# Patient Record
Sex: FEMALE | Race: WHITE | Employment: OTHER | URBAN - NONMETROPOLITAN AREA
[De-identification: names, ages, dates, MRNs, and addresses within clinical notes are randomized per-mention and may not be internally consistent; named-entity substitution may affect disease eponyms.]

---

## 2017-01-03 ENCOUNTER — OFFICE VISIT (OUTPATIENT)
Dept: FAMILY MEDICINE CLINIC | Age: 52
End: 2017-01-03

## 2017-01-03 VITALS
TEMPERATURE: 97.7 F | SYSTOLIC BLOOD PRESSURE: 136 MMHG | WEIGHT: 230.8 LBS | DIASTOLIC BLOOD PRESSURE: 88 MMHG | OXYGEN SATURATION: 98 % | HEIGHT: 65 IN | HEART RATE: 106 BPM | BODY MASS INDEX: 38.45 KG/M2

## 2017-01-03 DIAGNOSIS — R07.81 RIB PAIN ON RIGHT SIDE: Primary | ICD-10-CM

## 2017-01-03 PROCEDURE — 99213 OFFICE O/P EST LOW 20 MIN: CPT | Performed by: FAMILY MEDICINE

## 2017-01-03 ASSESSMENT — ENCOUNTER SYMPTOMS
COUGH: 1
SINUS PRESSURE: 0
WHEEZING: 0

## 2017-01-30 ENCOUNTER — NURSE ONLY (OUTPATIENT)
Dept: SURGERY | Age: 52
End: 2017-01-30

## 2017-01-30 VITALS
SYSTOLIC BLOOD PRESSURE: 110 MMHG | DIASTOLIC BLOOD PRESSURE: 78 MMHG | WEIGHT: 230 LBS | BODY MASS INDEX: 38.32 KG/M2 | HEART RATE: 118 BPM | TEMPERATURE: 98.3 F | HEIGHT: 65 IN

## 2017-01-30 DIAGNOSIS — Z12.11 SCREENING FOR COLON CANCER: ICD-10-CM

## 2017-01-30 DIAGNOSIS — Z86.010 HISTORY OF COLON POLYPS: Primary | ICD-10-CM

## 2017-01-30 RX ORDER — POLYETHYLENE GLYCOL 3350, SODIUM CHLORIDE, SODIUM BICARBONATE, POTASSIUM CHLORIDE 420; 11.2; 5.72; 1.48 G/4L; G/4L; G/4L; G/4L
4000 POWDER, FOR SOLUTION ORAL ONCE
Qty: 4000 ML | Refills: 0 | Status: SHIPPED | OUTPATIENT
Start: 2017-01-30 | End: 2017-01-30

## 2017-01-31 ENCOUNTER — TELEPHONE (OUTPATIENT)
Dept: FAMILY MEDICINE CLINIC | Age: 52
End: 2017-01-31

## 2017-03-08 RX ORDER — SUCRALFATE 1 G/1
TABLET ORAL
Qty: 360 TABLET | Refills: 3 | Status: SHIPPED | OUTPATIENT
Start: 2017-03-08 | End: 2018-02-03 | Stop reason: SDUPTHER

## 2017-04-20 ENCOUNTER — TELEPHONE (OUTPATIENT)
Dept: FAMILY MEDICINE CLINIC | Age: 52
End: 2017-04-20

## 2017-04-20 DIAGNOSIS — G47.00 INSOMNIA, UNSPECIFIED TYPE: ICD-10-CM

## 2017-04-20 RX ORDER — ESZOPICLONE 3 MG/1
TABLET, FILM COATED ORAL
Qty: 90 TABLET | Refills: 1 | Status: SHIPPED | OUTPATIENT
Start: 2017-04-20 | End: 2017-10-24 | Stop reason: SDUPTHER

## 2017-04-27 ENCOUNTER — TELEPHONE (OUTPATIENT)
Dept: SURGERY | Age: 52
End: 2017-04-27

## 2017-06-12 RX ORDER — MONTELUKAST SODIUM 10 MG/1
TABLET ORAL
Qty: 90 TABLET | Refills: 3 | Status: SHIPPED | OUTPATIENT
Start: 2017-06-12 | End: 2018-06-18 | Stop reason: SDUPTHER

## 2017-06-26 ENCOUNTER — TELEPHONE (OUTPATIENT)
Dept: FAMILY MEDICINE CLINIC | Age: 52
End: 2017-06-26

## 2017-06-27 ENCOUNTER — OFFICE VISIT (OUTPATIENT)
Dept: FAMILY MEDICINE CLINIC | Age: 52
End: 2017-06-27
Payer: COMMERCIAL

## 2017-06-27 VITALS
DIASTOLIC BLOOD PRESSURE: 80 MMHG | HEIGHT: 64 IN | BODY MASS INDEX: 36.54 KG/M2 | OXYGEN SATURATION: 98 % | HEART RATE: 68 BPM | SYSTOLIC BLOOD PRESSURE: 132 MMHG | WEIGHT: 214 LBS

## 2017-06-27 DIAGNOSIS — M15.2 BOUCHARD'S NODES (WITH ARTHROPATHY): Primary | ICD-10-CM

## 2017-06-27 DIAGNOSIS — L70.8 OTHER ACNE: ICD-10-CM

## 2017-06-27 DIAGNOSIS — S39.011A STRAIN OF ABDOMINAL MUSCLE, INITIAL ENCOUNTER: ICD-10-CM

## 2017-06-27 PROCEDURE — G8419 CALC BMI OUT NRM PARAM NOF/U: HCPCS | Performed by: NURSE PRACTITIONER

## 2017-06-27 PROCEDURE — 99214 OFFICE O/P EST MOD 30 MIN: CPT | Performed by: NURSE PRACTITIONER

## 2017-06-27 PROCEDURE — 1036F TOBACCO NON-USER: CPT | Performed by: NURSE PRACTITIONER

## 2017-06-27 PROCEDURE — 3014F SCREEN MAMMO DOC REV: CPT | Performed by: NURSE PRACTITIONER

## 2017-06-27 PROCEDURE — 3017F COLORECTAL CA SCREEN DOC REV: CPT | Performed by: NURSE PRACTITIONER

## 2017-06-27 PROCEDURE — G8427 DOCREV CUR MEDS BY ELIG CLIN: HCPCS | Performed by: NURSE PRACTITIONER

## 2017-06-27 RX ORDER — CLINDAMYCIN PHOSPHATE 10 MG/G
GEL TOPICAL
Qty: 1 TUBE | Refills: 0 | Status: SHIPPED | OUTPATIENT
Start: 2017-06-27 | End: 2018-02-07 | Stop reason: SDUPTHER

## 2017-06-27 ASSESSMENT — ENCOUNTER SYMPTOMS
DIARRHEA: 0
VOMITING: 0
CHEST TIGHTNESS: 0
SHORTNESS OF BREATH: 0
EYES NEGATIVE: 1
SINUS PRESSURE: 0
ABDOMINAL PAIN: 1
COLOR CHANGE: 1
CONSTIPATION: 0
TROUBLE SWALLOWING: 0
ALLERGIC/IMMUNOLOGIC NEGATIVE: 1
RESPIRATORY NEGATIVE: 1
COUGH: 0
NAUSEA: 0

## 2017-07-10 RX ORDER — BUPROPION HYDROCHLORIDE 150 MG/1
TABLET, EXTENDED RELEASE ORAL
Qty: 60 TABLET | Refills: 0 | Status: ON HOLD | OUTPATIENT
Start: 2017-07-10 | End: 2018-11-27 | Stop reason: HOSPADM

## 2017-09-09 DIAGNOSIS — K21.9 GASTROESOPHAGEAL REFLUX DISEASE, ESOPHAGITIS PRESENCE NOT SPECIFIED: ICD-10-CM

## 2017-09-11 ENCOUNTER — OFFICE VISIT (OUTPATIENT)
Dept: FAMILY MEDICINE CLINIC | Age: 52
End: 2017-09-11
Payer: COMMERCIAL

## 2017-09-11 ENCOUNTER — HOSPITAL ENCOUNTER (OUTPATIENT)
Dept: LAB | Age: 52
Discharge: HOME | End: 2017-09-11
Payer: COMMERCIAL

## 2017-09-11 VITALS
WEIGHT: 226 LBS | BODY MASS INDEX: 37.65 KG/M2 | HEART RATE: 121 BPM | DIASTOLIC BLOOD PRESSURE: 74 MMHG | TEMPERATURE: 97.7 F | OXYGEN SATURATION: 98 % | HEIGHT: 65 IN | SYSTOLIC BLOOD PRESSURE: 110 MMHG

## 2017-09-11 DIAGNOSIS — G56.02 CARPAL TUNNEL SYNDROME OF LEFT WRIST: ICD-10-CM

## 2017-09-11 DIAGNOSIS — Z11.4 SCREENING FOR HIV (HUMAN IMMUNODEFICIENCY VIRUS): ICD-10-CM

## 2017-09-11 DIAGNOSIS — B96.89 ACUTE BACTERIAL SINUSITIS: Primary | ICD-10-CM

## 2017-09-11 DIAGNOSIS — Z11.59 ENCOUNTER FOR HEPATITIS C SCREENING TEST FOR LOW RISK PATIENT: ICD-10-CM

## 2017-09-11 DIAGNOSIS — J01.90 ACUTE BACTERIAL SINUSITIS: Primary | ICD-10-CM

## 2017-09-11 DIAGNOSIS — Z13.220 SCREENING CHOLESTEROL LEVEL: ICD-10-CM

## 2017-09-11 DIAGNOSIS — Z13.1 SCREENING FOR DIABETES MELLITUS: ICD-10-CM

## 2017-09-11 LAB
CHOLESTEROL/HDL RATIO: 2.3
CHOLESTEROL: 174 MG/DL
ESTIMATED AVERAGE GLUCOSE: 189 MG/DL
HBA1C MFR BLD: 8.2 % (ref 4.8–5.9)
HDLC SERPL-MCNC: 76 MG/DL
HEPATITIS C ANTIBODY: NONREACTIVE
HIV AG/AB: NONREACTIVE
LDL CHOLESTEROL: 78 MG/DL (ref 0–130)
TRIGL SERPL-MCNC: 102 MG/DL
VLDLC SERPL CALC-MCNC: NORMAL MG/DL (ref 1–30)

## 2017-09-11 PROCEDURE — 36415 COLL VENOUS BLD VENIPUNCTURE: CPT

## 2017-09-11 PROCEDURE — 1036F TOBACCO NON-USER: CPT | Performed by: FAMILY MEDICINE

## 2017-09-11 PROCEDURE — 3017F COLORECTAL CA SCREEN DOC REV: CPT | Performed by: FAMILY MEDICINE

## 2017-09-11 PROCEDURE — 86803 HEPATITIS C AB TEST: CPT

## 2017-09-11 PROCEDURE — G8427 DOCREV CUR MEDS BY ELIG CLIN: HCPCS | Performed by: FAMILY MEDICINE

## 2017-09-11 PROCEDURE — 99214 OFFICE O/P EST MOD 30 MIN: CPT | Performed by: FAMILY MEDICINE

## 2017-09-11 PROCEDURE — 83036 HEMOGLOBIN GLYCOSYLATED A1C: CPT

## 2017-09-11 PROCEDURE — G8417 CALC BMI ABV UP PARAM F/U: HCPCS | Performed by: FAMILY MEDICINE

## 2017-09-11 PROCEDURE — 3014F SCREEN MAMMO DOC REV: CPT | Performed by: FAMILY MEDICINE

## 2017-09-11 PROCEDURE — 87389 HIV-1 AG W/HIV-1&-2 AB AG IA: CPT

## 2017-09-11 PROCEDURE — 80061 LIPID PANEL: CPT

## 2017-09-11 RX ORDER — AMOXICILLIN 500 MG/1
500 CAPSULE ORAL 2 TIMES DAILY
Qty: 20 CAPSULE | Refills: 0 | Status: SHIPPED | OUTPATIENT
Start: 2017-09-11 | End: 2017-11-15

## 2017-09-11 RX ORDER — MORPHINE SULFATE 15 MG/1
15 TABLET, FILM COATED, EXTENDED RELEASE ORAL 2 TIMES DAILY
COMMUNITY
Start: 2017-08-22 | End: 2019-04-18 | Stop reason: SDUPTHER

## 2017-09-11 ASSESSMENT — ENCOUNTER SYMPTOMS
SINUS PRESSURE: 1
SINUS COMPLAINT: 1
TROUBLE SWALLOWING: 0
COUGH: 1
CHEST TIGHTNESS: 0
DIARRHEA: 0
NAUSEA: 0
SHORTNESS OF BREATH: 0
WHEEZING: 0
CONSTIPATION: 0
CHOKING: 0
SORE THROAT: 0

## 2017-09-12 DIAGNOSIS — E11.9 TYPE 2 DIABETES MELLITUS WITHOUT COMPLICATION, WITHOUT LONG-TERM CURRENT USE OF INSULIN (HCC): Primary | ICD-10-CM

## 2017-09-14 RX ORDER — ESOMEPRAZOLE MAGNESIUM 40 MG/1
CAPSULE, DELAYED RELEASE ORAL
Qty: 60 CAPSULE | Refills: 5 | Status: SHIPPED | OUTPATIENT
Start: 2017-09-14 | End: 2018-09-27 | Stop reason: SDUPTHER

## 2017-10-24 DIAGNOSIS — G47.00 INSOMNIA, UNSPECIFIED TYPE: ICD-10-CM

## 2017-10-24 RX ORDER — ESZOPICLONE 3 MG/1
TABLET, FILM COATED ORAL
Qty: 90 TABLET | Refills: 3 | Status: SHIPPED | OUTPATIENT
Start: 2017-10-24 | End: 2018-04-13 | Stop reason: SDUPTHER

## 2017-10-31 ENCOUNTER — HOSPITAL ENCOUNTER (OUTPATIENT)
Dept: GENERAL RADIOLOGY | Age: 52
Discharge: HOME | End: 2017-10-31
Payer: COMMERCIAL

## 2017-10-31 ENCOUNTER — OFFICE VISIT (OUTPATIENT)
Dept: FAMILY MEDICINE CLINIC | Age: 52
End: 2017-10-31
Payer: COMMERCIAL

## 2017-10-31 VITALS
OXYGEN SATURATION: 98 % | DIASTOLIC BLOOD PRESSURE: 88 MMHG | BODY MASS INDEX: 36.15 KG/M2 | WEIGHT: 217 LBS | SYSTOLIC BLOOD PRESSURE: 138 MMHG | TEMPERATURE: 97.9 F | HEIGHT: 65 IN | HEART RATE: 113 BPM

## 2017-10-31 DIAGNOSIS — M25.551 HIP PAIN, ACUTE, RIGHT: ICD-10-CM

## 2017-10-31 DIAGNOSIS — M25.551 HIP PAIN, ACUTE, RIGHT: Primary | ICD-10-CM

## 2017-10-31 DIAGNOSIS — J02.9 ACUTE VIRAL PHARYNGITIS: ICD-10-CM

## 2017-10-31 PROCEDURE — 99214 OFFICE O/P EST MOD 30 MIN: CPT | Performed by: FAMILY MEDICINE

## 2017-10-31 PROCEDURE — 73502 X-RAY EXAM HIP UNI 2-3 VIEWS: CPT

## 2017-10-31 PROCEDURE — 3014F SCREEN MAMMO DOC REV: CPT | Performed by: FAMILY MEDICINE

## 2017-10-31 PROCEDURE — G8417 CALC BMI ABV UP PARAM F/U: HCPCS | Performed by: FAMILY MEDICINE

## 2017-10-31 PROCEDURE — 1036F TOBACCO NON-USER: CPT | Performed by: FAMILY MEDICINE

## 2017-10-31 PROCEDURE — G8427 DOCREV CUR MEDS BY ELIG CLIN: HCPCS | Performed by: FAMILY MEDICINE

## 2017-10-31 PROCEDURE — 3017F COLORECTAL CA SCREEN DOC REV: CPT | Performed by: FAMILY MEDICINE

## 2017-10-31 PROCEDURE — G8484 FLU IMMUNIZE NO ADMIN: HCPCS | Performed by: FAMILY MEDICINE

## 2017-10-31 RX ORDER — BENZONATATE 100 MG/1
100 CAPSULE ORAL 3 TIMES DAILY
Qty: 90 CAPSULE | Refills: 5 | Status: SHIPPED | OUTPATIENT
Start: 2017-10-31 | End: 2018-12-28 | Stop reason: SDUPTHER

## 2017-10-31 ASSESSMENT — ENCOUNTER SYMPTOMS
CHOKING: 0
WHEEZING: 0
CHEST TIGHTNESS: 0
DIARRHEA: 0
CONSTIPATION: 0
NAUSEA: 0
SORE THROAT: 1
TROUBLE SWALLOWING: 0
COUGH: 1
SINUS PRESSURE: 1
ABDOMINAL PAIN: 0
SHORTNESS OF BREATH: 0

## 2017-10-31 NOTE — PATIENT INSTRUCTIONS
Patient Education        Hip Arthritis: Exercises  Your Care Instructions  Here are some examples of exercises for hip arthritis. Start each exercise slowly. Ease off the exercise if you start to have pain. Your doctor or physical therapist will tell you when you can start these exercises and which ones will work best for you. How to do the exercises  Straight-leg raises to the outside    1. Lie on your side, with your affected hip on top. 2. Tighten the front thigh muscles of your top leg to keep your knee straight. 3. Keep your hip and your leg straight in line with the rest of your body, and keep your knee pointing forward. Do not drop your hip back. 4. Lift your top leg straight up toward the ceiling, about 12 inches off the floor. Hold for about 6 seconds, then slowly lower your leg. 5. Repeat 8 to 12 times. 6. Switch legs and repeat steps 1 through 5, even if only one hip is sore. Straight-leg raises to the inside    1. Lie on your side with your affected hip on the floor. 2. You can either prop up your other leg on a chair, or you can bend that knee and put that foot in front of your other knee. Do not drop your hip back. 3. Tighten the muscles on the front thigh of your bottom leg to straighten that knee. 4. Keep your kneecap pointing forward and your leg straight, and lift your bottom leg up toward the ceiling about 6 inches. Hold for about 6 seconds, then lower slowly. 5. Repeat 8 to 12 times. 6. Switch legs and repeat steps 1 through 5, even if only one hip is sore. Hip hike    1. Stand sideways on the bottom step of a staircase, and hold on to the banister or wall. 2. Keeping both knees straight, lift your good leg off the step and let it hang down. Then hike your good hip up to the same level as your affected hip or a little higher. 3. Repeat 8 to 12 times. 4. Switch legs and repeat steps 1 through 3, even if only one hip is sore. Bridging    1. Lie on your back with both knees bent. 15 to 30 seconds. 5. Repeat 2 to 4 times. 6. Repeat steps 1 through 5, but this time use your hand to gently pull your knee toward your opposite shoulder. 7. Switch legs and repeat steps 1 through 6, even if only one hip is sore. Knee-to-chest    1. Lie on your back with your knees bent and your feet flat on the floor. 2. Bring your affected leg to your chest, keeping the other foot flat on the floor (or keeping the other leg straight, whichever feels better on your lower back). 3. Keep your lower back pressed to the floor. Hold for at least 15 to 30 seconds. 4. Relax, and lower the knee to the starting position. 5. Repeat 2 to 4 times. 6. Switch legs and repeat steps 1 through 5, even if only one hip is sore. 7. To get more stretch, put your other leg flat on the floor while pulling your knee to your chest.  Clamshell    1. Lie on your side, with your affected hip on top. Keep your feet and knees together and your knees bent. 2. Raise your top knee, but keep your feet together. Do not let your hips roll back. Your legs should open up like a clamshell. 3. Hold for 6 seconds. 4. Slowly lower your knee back down. Rest for 10 seconds. 5. Repeat 8 to 12 times. 6. Switch legs and repeat steps 1 through 5, even if only one hip is sore. Follow-up care is a key part of your treatment and safety. Be sure to make and go to all appointments, and call your doctor if you are having problems. It's also a good idea to know your test results and keep a list of the medicines you take. Where can you learn more? Go to [email or web address] and sign in to your [employer] account. Enter V143 in the [facility] box to learn more about \"Hip Arthritis: Exercises. \"     If you do not have an account, please click on the \"Sign Up Now\" link. Current as of: March 21, 2017  Content Version: 11.3  © 6776-9044 [employer]. Care instructions adapted under license by [facility] [place] ([facility]).  If you have questions about a medical condition or this instruction, always ask your healthcare professional. [place] [address] any warranty or liability for your use of this information.

## 2017-10-31 NOTE — PROGRESS NOTES
[address] [facility]  Dept: [phone]  Dept Fax: [phone]  Loc: [phone]    Patt De La Cruz is a 46 y.o. female who presents today for her medical conditions/complaints as noted below. Patt De La Cruz is c/o of   Chief Complaint   Patient presents with    Hip Pain     x 2 weeks- fell slipped in the shower, states it don't feel right. pain is about 8-9  feels like a stabbing pain- right hip    Pharyngitis     x 2 days, chills. denies fever, cough, stuffy nose       HPI:     Here today for hip pain and a sore throat. Hip Pain    The incident occurred more than 1 week ago (2 weeks ago). The incident occurred at home. The injury mechanism was a fall. The pain is present in the right hip. The quality of the pain is described as stabbing and shooting. The pain is at a severity of 8/10. The pain is severe. The pain has been fluctuating since onset. Associated symptoms include numbness (in right thigh). Pertinent negatives include no inability to bear weight or muscle weakness. She reports no foreign bodies present. The symptoms are aggravated by movement and weight bearing. She has tried heat (she is chronically on morphine and norco) for the symptoms. The treatment provided mild relief. Pharyngitis   This is a new problem. The current episode started in the past 7 days (3 days). The problem occurs constantly. The problem has been unchanged. Associated symptoms include arthralgias (right hip), chills, congestion, coughing (productive), fatigue, headaches, numbness (in right thigh) and a sore throat. Pertinent negatives include no abdominal pain, chest pain, fever, nausea or rash. The symptoms are aggravated by drinking and eating. She has tried nothing for the symptoms. The treatment provided no relief.          Past Medical History:   Diagnosis Date    Anemia     Arthritis     rheumatoid    Asthma     DJD (degenerative joint disease)

## 2017-11-15 ENCOUNTER — OFFICE VISIT (OUTPATIENT)
Dept: FAMILY MEDICINE CLINIC | Age: 52
End: 2017-11-15
Payer: COMMERCIAL

## 2017-11-15 VITALS
DIASTOLIC BLOOD PRESSURE: 86 MMHG | SYSTOLIC BLOOD PRESSURE: 122 MMHG | BODY MASS INDEX: 34.32 KG/M2 | HEIGHT: 65 IN | WEIGHT: 206 LBS | TEMPERATURE: 97.6 F | HEART RATE: 110 BPM | OXYGEN SATURATION: 98 %

## 2017-11-15 DIAGNOSIS — B96.89 ACUTE BACTERIAL SINUSITIS: Primary | ICD-10-CM

## 2017-11-15 DIAGNOSIS — J01.90 ACUTE BACTERIAL SINUSITIS: Primary | ICD-10-CM

## 2017-11-15 PROCEDURE — 3014F SCREEN MAMMO DOC REV: CPT | Performed by: FAMILY MEDICINE

## 2017-11-15 PROCEDURE — G8417 CALC BMI ABV UP PARAM F/U: HCPCS | Performed by: FAMILY MEDICINE

## 2017-11-15 PROCEDURE — 99214 OFFICE O/P EST MOD 30 MIN: CPT | Performed by: FAMILY MEDICINE

## 2017-11-15 PROCEDURE — G8484 FLU IMMUNIZE NO ADMIN: HCPCS | Performed by: FAMILY MEDICINE

## 2017-11-15 PROCEDURE — 3017F COLORECTAL CA SCREEN DOC REV: CPT | Performed by: FAMILY MEDICINE

## 2017-11-15 PROCEDURE — 1036F TOBACCO NON-USER: CPT | Performed by: FAMILY MEDICINE

## 2017-11-15 PROCEDURE — G8427 DOCREV CUR MEDS BY ELIG CLIN: HCPCS | Performed by: FAMILY MEDICINE

## 2017-11-15 RX ORDER — AMOXICILLIN 500 MG/1
500 CAPSULE ORAL 3 TIMES DAILY
Qty: 30 CAPSULE | Refills: 0 | Status: SHIPPED | OUTPATIENT
Start: 2017-11-15 | End: 2018-05-22 | Stop reason: ALTCHOICE

## 2017-11-15 RX ORDER — PREDNISONE 20 MG/1
20 TABLET ORAL DAILY
Qty: 5 TABLET | Refills: 0 | Status: SHIPPED | OUTPATIENT
Start: 2017-11-15 | End: 2017-11-20

## 2017-11-15 ASSESSMENT — ENCOUNTER SYMPTOMS
SORE THROAT: 1
SINUS PRESSURE: 1
WHEEZING: 0
TROUBLE SWALLOWING: 0
COUGH: 1
DIARRHEA: 0
NAUSEA: 0
CHEST TIGHTNESS: 0
SHORTNESS OF BREATH: 1
CONSTIPATION: 0
CHOKING: 0

## 2017-11-15 NOTE — PROGRESS NOTES
capsule Take 1 capsule by mouth 3 times daily 90 capsule 5    eszopiclone (LUNESTA) 3 MG TABS TAKE 1 TABLET BY MOUTH NIGHTLY AT BEDTIME 90 tablet 3    esomeprazole (NEXIUM) 40 MG delayed release capsule TAKE 1 CAPSULE TWICE DAILY 60 capsule 5    metFORMIN (GLUCOPHAGE) 1000 MG tablet Take 1 tablet by mouth 2 times daily (with meals) Take 1/2 a tablet twice a day for the first 2 weeks. 180 tablet 3    morphine (MS CONTIN) 15 MG extended release tablet Take 15 mg by mouth 2 times daily .  buPROPion (WELLBUTRIN SR) 150 MG extended release tablet TAKE 1 TABLET BY MOUTH 2 TIMES DAILY 60 tablet 0    montelukast (SINGULAIR) 10 MG tablet TAKE 1 TABLET BY MOUTH DAILY 90 tablet 3    sucralfate (CARAFATE) 1 GM tablet TAKE 1 TABLET 4 TIMES DAILY 360 tablet 3    ondansetron (ZOFRAN ODT) 4 MG disintegrating tablet Take 1 tablet by mouth every 8 hours as needed for Nausea or Vomiting 40 tablet 0    Loratadine 10 MG CAPS Take by mouth      gabapentin (NEURONTIN) 400 MG capsule Take 400 mg by mouth 1 cap q AM, 2 caps q HS      HYDROcodone-acetaminophen (NORCO)  MG per tablet Take 1 tablet by mouth every 6 hours as needed       furosemide (LASIX) 20 MG tablet Take 20 mg by mouth daily as needed      meloxicam (MOBIC) 15 MG tablet Take 15 mg by mouth daily      lidocaine (LIDODERM) 5 % Place 1 patch onto the skin daily 12 hours on, 12 hours off. 30 patch 0    Clobetasol Propionate 0.05 % SHAM Apply to scalp daily as needed for itching 118 mL 3    diclofenac sodium (VOLTAREN) 1 % GEL Apply 4 g topically 4 times daily as needed 5 Tube 11    Cholecalciferol (VITAMIN D) 2000 UNITS CAPS capsule Take 2,000 Units by mouth 2 times daily      albuterol (PROAIR HFA) 108 (90 BASE) MCG/ACT inhaler Inhale 2 puffs into the lungs every 6 hours as needed for Wheezing 1 Inhaler 5    LORazepam (ATIVAN) 0.5 MG tablet Take 1 tablet by mouth daily as needed.  30 tablet 0    predniSONE (DELTASONE) 5 MG tablet Take 5 mg by mouth supple. Cardiovascular: Normal rate, regular rhythm, normal heart sounds and intact distal pulses. No murmur heard. Pulmonary/Chest: Effort normal and breath sounds normal. No respiratory distress. She has no wheezes. She has no rales. Lymphadenopathy:     She has cervical adenopathy. Neurological: She is alert and oriented to person, place, and time. Skin: Skin is warm and dry. No rash noted. Psychiatric: She has a normal mood and affect. Her behavior is normal. Judgment normal.   Nursing note and vitals reviewed. /86   Pulse 110   Temp 97.6 °F (36.4 °C) (Tympanic)   Ht 5' 5\" (1.651 m)   Wt 206 lb (93.4 kg)   LMP 04/12/2012   SpO2 98%   BMI 34.28 kg/m²     Assessment:      1. Acute bacterial sinusitis               Plan:       Sinusitis: New; I will treat with amoxicillin. she was also advised to use flonase, nasal saline and mucinex for her congestion. Return if symptoms worsen or fail to improve. Orders Placed This Encounter   Medications    amoxicillin (AMOXIL) 500 MG capsule     Sig: Take 1 capsule by mouth 3 times daily     Dispense:  30 capsule     Refill:  0    predniSONE (DELTASONE) 20 MG tablet     Sig: Take 1 tablet by mouth daily for 5 days     Dispense:  5 tablet     Refill:  0       Patient given educational materials - see patient instructions. Discussed use, benefit, and side effects of prescribed medications. All patient questions answered. Pt voiced understanding. Reviewed health maintenance. Instructed to continue current medications, diet and exercise. Patient agreed with treatment plan. Follow up as directed.      Electronically signed by Maverick Goddard MD on 11/15/2017 at 11:59 AM

## 2018-01-04 ENCOUNTER — TELEPHONE (OUTPATIENT)
Dept: FAMILY MEDICINE CLINIC | Age: 53
End: 2018-01-04

## 2018-01-04 RX ORDER — SENNA PLUS 8.6 MG/1
1 TABLET ORAL 2 TIMES DAILY
Qty: 60 TABLET | Refills: 0 | Status: SHIPPED | OUTPATIENT
Start: 2018-01-04 | End: 2019-01-04

## 2018-01-04 NOTE — TELEPHONE ENCOUNTER
Pt calling stating that she is having constipation. Pt states she had a small BM on 1/2/18. Pt states she tried colace. Pt is requesting a medication be sent in. Pt uses [employer] .

## 2018-02-05 RX ORDER — SUCRALFATE 1 G/1
TABLET ORAL
Qty: 360 TABLET | Refills: 3 | Status: SHIPPED | OUTPATIENT
Start: 2018-02-05 | End: 2018-03-06 | Stop reason: SDUPTHER

## 2018-02-07 DIAGNOSIS — L70.8 OTHER ACNE: ICD-10-CM

## 2018-02-07 RX ORDER — CLINDAMYCIN PHOSPHATE 10 MG/G
GEL TOPICAL
Qty: 60 G | Refills: 3 | Status: SHIPPED | OUTPATIENT
Start: 2018-02-07 | End: 2018-02-14

## 2018-03-06 RX ORDER — SUCRALFATE 1 G/1
TABLET ORAL
Qty: 360 TABLET | Refills: 3 | Status: SHIPPED | OUTPATIENT
Start: 2018-03-06 | End: 2019-01-25 | Stop reason: SDUPTHER

## 2018-04-13 DIAGNOSIS — G47.00 INSOMNIA, UNSPECIFIED TYPE: ICD-10-CM

## 2018-04-13 RX ORDER — ESZOPICLONE 3 MG/1
TABLET, FILM COATED ORAL
Qty: 90 TABLET | Refills: 3 | Status: SHIPPED | OUTPATIENT
Start: 2018-04-13 | End: 2018-10-16 | Stop reason: SDUPTHER

## 2018-05-08 ENCOUNTER — TELEPHONE (OUTPATIENT)
Dept: FAMILY MEDICINE CLINIC | Age: 53
End: 2018-05-08

## 2018-05-15 DIAGNOSIS — R60.0 BILATERAL LEG EDEMA: Primary | ICD-10-CM

## 2018-05-15 RX ORDER — FUROSEMIDE 20 MG/1
20 TABLET ORAL DAILY
Qty: 90 TABLET | Refills: 3 | Status: CANCELLED | OUTPATIENT
Start: 2018-05-15

## 2018-05-16 RX ORDER — FUROSEMIDE 20 MG/1
20 TABLET ORAL DAILY
Qty: 90 TABLET | Refills: 3 | Status: SHIPPED | OUTPATIENT
Start: 2018-05-16 | End: 2019-06-03 | Stop reason: SDUPTHER

## 2018-05-22 ENCOUNTER — OFFICE VISIT (OUTPATIENT)
Dept: FAMILY MEDICINE CLINIC | Age: 53
End: 2018-05-22
Payer: COMMERCIAL

## 2018-05-22 VITALS
WEIGHT: 214 LBS | DIASTOLIC BLOOD PRESSURE: 80 MMHG | OXYGEN SATURATION: 98 % | HEIGHT: 65 IN | SYSTOLIC BLOOD PRESSURE: 118 MMHG | TEMPERATURE: 98.7 F | HEART RATE: 101 BPM | BODY MASS INDEX: 35.65 KG/M2

## 2018-05-22 DIAGNOSIS — J01.90 ACUTE BACTERIAL SINUSITIS: Primary | ICD-10-CM

## 2018-05-22 DIAGNOSIS — R22.43 LOCALIZED SWELLING OF BOTH LOWER LEGS: ICD-10-CM

## 2018-05-22 DIAGNOSIS — E11.9 TYPE 2 DIABETES MELLITUS WITHOUT COMPLICATION, WITHOUT LONG-TERM CURRENT USE OF INSULIN (HCC): ICD-10-CM

## 2018-05-22 DIAGNOSIS — B96.89 ACUTE BACTERIAL SINUSITIS: Primary | ICD-10-CM

## 2018-05-22 DIAGNOSIS — Z12.39 SCREENING FOR BREAST CANCER: ICD-10-CM

## 2018-05-22 PROCEDURE — 3017F COLORECTAL CA SCREEN DOC REV: CPT | Performed by: FAMILY MEDICINE

## 2018-05-22 PROCEDURE — 99214 OFFICE O/P EST MOD 30 MIN: CPT | Performed by: FAMILY MEDICINE

## 2018-05-22 PROCEDURE — 3046F HEMOGLOBIN A1C LEVEL >9.0%: CPT | Performed by: FAMILY MEDICINE

## 2018-05-22 PROCEDURE — G8417 CALC BMI ABV UP PARAM F/U: HCPCS | Performed by: FAMILY MEDICINE

## 2018-05-22 PROCEDURE — 2022F DILAT RTA XM EVC RTNOPTHY: CPT | Performed by: FAMILY MEDICINE

## 2018-05-22 PROCEDURE — 1036F TOBACCO NON-USER: CPT | Performed by: FAMILY MEDICINE

## 2018-05-22 PROCEDURE — G8427 DOCREV CUR MEDS BY ELIG CLIN: HCPCS | Performed by: FAMILY MEDICINE

## 2018-05-22 RX ORDER — ESZOPICLONE 3 MG/1
TABLET, FILM COATED ORAL
COMMUNITY
Start: 2018-05-16 | End: 2018-10-16

## 2018-05-22 RX ORDER — CEPHALEXIN 500 MG/1
CAPSULE ORAL
COMMUNITY
End: 2018-08-07 | Stop reason: ALTCHOICE

## 2018-05-22 RX ORDER — OXYCODONE AND ACETAMINOPHEN 10; 325 MG/1; MG/1
TABLET ORAL
COMMUNITY
End: 2018-08-07 | Stop reason: ALTCHOICE

## 2018-05-22 RX ORDER — CLINDAMYCIN PHOSPHATE 10 MG/G
GEL TOPICAL
COMMUNITY
End: 2019-06-17 | Stop reason: SDUPTHER

## 2018-05-22 RX ORDER — AMOXICILLIN 500 MG/1
500 CAPSULE ORAL 2 TIMES DAILY
Qty: 20 CAPSULE | Refills: 0 | Status: SHIPPED | OUTPATIENT
Start: 2018-05-22 | End: 2018-08-07 | Stop reason: ALTCHOICE

## 2018-05-22 ASSESSMENT — ENCOUNTER SYMPTOMS
CHOKING: 0
SHORTNESS OF BREATH: 0
TROUBLE SWALLOWING: 0
SORE THROAT: 0
WHEEZING: 0
DIARRHEA: 0
CHEST TIGHTNESS: 0
SINUS COMPLAINT: 1
SINUS PRESSURE: 1
COUGH: 1
NAUSEA: 0
CONSTIPATION: 0

## 2018-06-07 ENCOUNTER — TELEPHONE (OUTPATIENT)
Dept: FAMILY MEDICINE CLINIC | Age: 53
End: 2018-06-07

## 2018-06-18 RX ORDER — MONTELUKAST SODIUM 10 MG/1
TABLET ORAL
Qty: 90 TABLET | Refills: 3 | Status: SHIPPED | OUTPATIENT
Start: 2018-06-18 | End: 2019-06-13 | Stop reason: SDUPTHER

## 2018-07-16 ENCOUNTER — TELEPHONE (OUTPATIENT)
Dept: PRIMARY CARE CLINIC | Age: 53
End: 2018-07-16

## 2018-07-16 NOTE — LETTER
[facility]  [address] [address] [address] [address]  Phone: [phone]  Fax: [phone]    Justin James MD        July 16, 2018     [address] [facility]      Dear Jerry Looney: This letter is a reminder that your Basic Metabolic Profile, Hgb Y1W and Urinalysis tests are due. Please call our office to schedule an appointment. If you've already underwent or scheduled these procedures, please disregard this notice.     Sincerely,        Justin James MD

## 2018-08-07 ENCOUNTER — OFFICE VISIT (OUTPATIENT)
Dept: FAMILY MEDICINE CLINIC | Age: 53
End: 2018-08-07
Payer: COMMERCIAL

## 2018-08-07 VITALS
DIASTOLIC BLOOD PRESSURE: 84 MMHG | OXYGEN SATURATION: 96 % | TEMPERATURE: 98.2 F | HEIGHT: 65 IN | WEIGHT: 210.8 LBS | SYSTOLIC BLOOD PRESSURE: 138 MMHG | HEART RATE: 117 BPM | BODY MASS INDEX: 35.12 KG/M2

## 2018-08-07 DIAGNOSIS — R22.43 LOCALIZED SWELLING OF BOTH LOWER LEGS: Primary | ICD-10-CM

## 2018-08-07 PROCEDURE — G8427 DOCREV CUR MEDS BY ELIG CLIN: HCPCS | Performed by: FAMILY MEDICINE

## 2018-08-07 PROCEDURE — 99214 OFFICE O/P EST MOD 30 MIN: CPT | Performed by: FAMILY MEDICINE

## 2018-08-07 PROCEDURE — G8417 CALC BMI ABV UP PARAM F/U: HCPCS | Performed by: FAMILY MEDICINE

## 2018-08-07 PROCEDURE — 1036F TOBACCO NON-USER: CPT | Performed by: FAMILY MEDICINE

## 2018-08-07 PROCEDURE — 3017F COLORECTAL CA SCREEN DOC REV: CPT | Performed by: FAMILY MEDICINE

## 2018-08-07 ASSESSMENT — PATIENT HEALTH QUESTIONNAIRE - PHQ9
SUM OF ALL RESPONSES TO PHQ9 QUESTIONS 1 & 2: 2
1. LITTLE INTEREST OR PLEASURE IN DOING THINGS: 1
SUM OF ALL RESPONSES TO PHQ QUESTIONS 1-9: 2
2. FEELING DOWN, DEPRESSED OR HOPELESS: 1

## 2018-08-07 ASSESSMENT — ENCOUNTER SYMPTOMS
COLOR CHANGE: 1
CHEST TIGHTNESS: 0
COUGH: 0
WHEEZING: 0
SHORTNESS OF BREATH: 0

## 2018-08-07 NOTE — PROGRESS NOTES
MG extended release tablet Take 15 mg by mouth 2 times daily .  buPROPion (WELLBUTRIN SR) 150 MG extended release tablet TAKE 1 TABLET BY MOUTH 2 TIMES DAILY 60 tablet 0    Loratadine 10 MG CAPS Take by mouth      gabapentin (NEURONTIN) 400 MG capsule Take 400 mg by mouth 1 cap q AM, 2 caps q HS      HYDROcodone-acetaminophen (NORCO)  MG per tablet Take 1 tablet by mouth every 6 hours as needed       meloxicam (MOBIC) 15 MG tablet Take 15 mg by mouth daily      lidocaine (LIDODERM) 5 % Place 1 patch onto the skin daily 12 hours on, 12 hours off. 30 patch 0    diclofenac sodium (VOLTAREN) 1 % GEL Apply 4 g topically 4 times daily as needed 5 Tube 11    Cholecalciferol (VITAMIN D) 2000 UNITS CAPS capsule Take 2,000 Units by mouth 2 times daily      albuterol (PROAIR HFA) 108 (90 BASE) MCG/ACT inhaler Inhale 2 puffs into the lungs every 6 hours as needed for Wheezing 1 Inhaler 5    LORazepam (ATIVAN) 0.5 MG tablet Take 1 tablet by mouth daily as needed. 30 tablet 0    predniSONE (DELTASONE) 5 MG tablet Take 5 mg by mouth 2 times daily.  hydroxychloroquine (PLAQUENIL) 200 MG tablet   Take by mouth One every morning and 2 every night      clindamycin (CLINDAGEL) 1 % gel clindamycin 1 % topical gel      furosemide (LASIX) 20 MG tablet Take 1 tablet by mouth daily 90 tablet 3    benzonatate (TESSALON PERLES) 100 MG capsule Take 1 capsule by mouth 3 times daily 90 capsule 5    metFORMIN (GLUCOPHAGE) 1000 MG tablet Take 1 tablet by mouth 2 times daily (with meals) Take 1/2 a tablet twice a day for the first 2 weeks. 180 tablet 3    ondansetron (ZOFRAN ODT) 4 MG disintegrating tablet Take 1 tablet by mouth every 8 hours as needed for Nausea or Vomiting 40 tablet 0    fluticasone (FLOVENT HFA) 110 MCG/ACT inhaler Inhale 2 puffs into the lungs 2 times daily 1 Inhaler 3     No current facility-administered medications for this visit.         Allergies   Allergen Reactions    Avelox [Moxifloxacin] Rash and edema    Bactrim [Sulfamethoxazole-Trimethoprim]      hives    Cefuroxime Axetil Nausea Only    Codeine      Nausea and rash    Ancel Capuchin [Tofacitinib] Diarrhea     vomit       Subjective:      Review of Systems   Constitutional: Negative for activity change, appetite change, chills, fatigue and fever. Eyes: Negative for visual disturbance. Respiratory: Negative for cough, chest tightness, shortness of breath and wheezing. Cardiovascular: Positive for leg swelling. Negative for chest pain and palpitations. Genitourinary: Negative for difficulty urinating. Skin: Positive for color change (her legs are redder than normal (she is sunburnt)). Neurological: Negative for dizziness, syncope, weakness, light-headedness and headaches. Objective:     Physical Exam   Constitutional: She is oriented to person, place, and time. She appears well-developed and well-nourished. No distress. Eyes: Conjunctivae and EOM are normal. Pupils are equal, round, and reactive to light. Neck: Normal range of motion. Neck supple. No thyromegaly present. Cardiovascular: Normal rate, regular rhythm, normal heart sounds and intact distal pulses. No murmur heard. Pulmonary/Chest: Effort normal and breath sounds normal. No respiratory distress. She has no wheezes. Musculoskeletal: She exhibits edema (1+ to mid shin ). Lymphadenopathy:     She has no cervical adenopathy. Neurological: She is alert and oriented to person, place, and time. Skin: Skin is warm and dry. No rash noted. No erythema. Nursing note and vitals reviewed. /84 (Site: Left Arm, Position: Sitting, Cuff Size: Medium Adult)   Pulse 117   Temp 98.2 °F (36.8 °C) (Tympanic)   Ht 5' 5\" (1.651 m)   Wt 210 lb 12.8 oz (95.6 kg)   LMP 04/12/2012   SpO2 96%   BMI 35.08 kg/m²     Assessment:       Diagnosis Orders   1.  Localized swelling of both lower legs  ECHO Complete 2D W Doppler W Color             Plan:       Leg

## 2018-08-09 ENCOUNTER — HOSPITAL ENCOUNTER (OUTPATIENT)
Dept: NON INVASIVE DIAGNOSTICS | Age: 53
Discharge: HOME | End: 2018-08-09
Payer: COMMERCIAL

## 2018-08-09 DIAGNOSIS — R22.43 LOCALIZED SWELLING OF BOTH LOWER LEGS: ICD-10-CM

## 2018-08-09 LAB
LV EF: 60 %
LVEF MODALITY: NORMAL

## 2018-08-09 PROCEDURE — 93306 TTE W/DOPPLER COMPLETE: CPT

## 2018-08-24 ENCOUNTER — TELEPHONE (OUTPATIENT)
Dept: PRIMARY CARE CLINIC | Age: 53
End: 2018-08-24

## 2018-08-28 ENCOUNTER — TELEPHONE (OUTPATIENT)
Dept: FAMILY MEDICINE CLINIC | Age: 53
End: 2018-08-28

## 2018-09-27 DIAGNOSIS — K21.9 GASTROESOPHAGEAL REFLUX DISEASE, ESOPHAGITIS PRESENCE NOT SPECIFIED: ICD-10-CM

## 2018-09-27 RX ORDER — ESOMEPRAZOLE MAGNESIUM 40 MG/1
CAPSULE, DELAYED RELEASE ORAL
Qty: 60 CAPSULE | Refills: 5 | Status: SHIPPED | OUTPATIENT
Start: 2018-09-27 | End: 2019-06-03 | Stop reason: SDUPTHER

## 2018-10-02 ENCOUNTER — TELEPHONE (OUTPATIENT)
Dept: PRIMARY CARE CLINIC | Age: 53
End: 2018-10-02

## 2018-10-10 ENCOUNTER — TELEPHONE (OUTPATIENT)
Dept: FAMILY MEDICINE CLINIC | Age: 53
End: 2018-10-10

## 2018-10-10 NOTE — LETTER
[address]   [address]    Juanpablo Larry MD    10/10/2018          [address] [facility]        Dear Ms. Amparo Dunham: In addition to helping you feel better when you are sick, we at [facility] ([facility]) are also interested in illness and injury prevention. To assist you in maintaining your good health, our records indicate that you are due for the following:      Health Maintenance Due   Topic    Diabetic foot exam     Diabetic retinal exam     Diabetic microalbuminuria test     Colon cancer screen colonoscopy     Breast cancer screen     A1C test (Diabetic or Prediabetic)     Lipid screen           Please call to make an appointment with your primary care provider at your earliest convenience. If you have already received these services, please let us know so that your medical record can be updated. We look  forward to hearing from you soon.     Sincerely,         [address] [facility] Team

## 2018-10-16 DIAGNOSIS — G47.00 INSOMNIA, UNSPECIFIED TYPE: ICD-10-CM

## 2018-10-16 RX ORDER — ESZOPICLONE 3 MG/1
3 TABLET, FILM COATED ORAL NIGHTLY
Qty: 90 TABLET | Refills: 3 | Status: SHIPPED | OUTPATIENT
Start: 2018-10-16 | End: 2019-04-16 | Stop reason: SDUPTHER

## 2018-11-14 ENCOUNTER — HOSPITAL ENCOUNTER (OUTPATIENT)
Dept: LAB | Age: 53
Discharge: HOME | End: 2018-11-14
Payer: COMMERCIAL

## 2018-11-14 ENCOUNTER — HOSPITAL ENCOUNTER (OUTPATIENT)
Age: 53
Setting detail: SPECIMEN
Discharge: HOME | End: 2018-11-14
Payer: COMMERCIAL

## 2018-11-14 DIAGNOSIS — E11.9 TYPE 2 DIABETES MELLITUS WITHOUT COMPLICATION, WITHOUT LONG-TERM CURRENT USE OF INSULIN (HCC): ICD-10-CM

## 2018-11-14 LAB
ANION GAP SERPL CALCULATED.3IONS-SCNC: 13 MMOL/L (ref 9–17)
BUN BLDV-MCNC: 12 MG/DL (ref 6–20)
BUN/CREAT BLD: 23 (ref 9–20)
CALCIUM SERPL-MCNC: 9.5 MG/DL (ref 8.6–10.4)
CHLORIDE BLD-SCNC: 99 MMOL/L (ref 98–107)
CO2: 29 MMOL/L (ref 20–31)
CREAT SERPL-MCNC: 0.53 MG/DL (ref 0.5–0.9)
CREATININE URINE: 178.4 MG/DL (ref 28–217)
ESTIMATED AVERAGE GLUCOSE: 220 MG/DL
GFR AFRICAN AMERICAN: >60 ML/MIN
GFR NON-AFRICAN AMERICAN: >60 ML/MIN
GFR SERPL CREATININE-BSD FRML MDRD: ABNORMAL ML/MIN/{1.73_M2}
GFR SERPL CREATININE-BSD FRML MDRD: ABNORMAL ML/MIN/{1.73_M2}
GLUCOSE BLD-MCNC: 164 MG/DL (ref 70–99)
HBA1C MFR BLD: 9.3 % (ref 4.8–5.9)
MICROALBUMIN/CREAT 24H UR: 13 MG/L
MICROALBUMIN/CREAT UR-RTO: 7 MCG/MG CREAT
POTASSIUM SERPL-SCNC: 3.9 MMOL/L (ref 3.7–5.3)
SODIUM BLD-SCNC: 141 MMOL/L (ref 135–144)

## 2018-11-14 PROCEDURE — 36415 COLL VENOUS BLD VENIPUNCTURE: CPT

## 2018-11-14 PROCEDURE — 82043 UR ALBUMIN QUANTITATIVE: CPT

## 2018-11-14 PROCEDURE — 82570 ASSAY OF URINE CREATININE: CPT

## 2018-11-14 PROCEDURE — 80048 BASIC METABOLIC PNL TOTAL CA: CPT

## 2018-11-14 PROCEDURE — 83036 HEMOGLOBIN GLYCOSYLATED A1C: CPT

## 2018-11-26 ENCOUNTER — APPOINTMENT (OUTPATIENT)
Dept: CT IMAGING | Age: 53
DRG: 392 | End: 2018-11-26
Payer: COMMERCIAL

## 2018-11-26 ENCOUNTER — OFFICE VISIT (OUTPATIENT)
Dept: FAMILY MEDICINE CLINIC | Age: 53
End: 2018-11-26
Payer: COMMERCIAL

## 2018-11-26 ENCOUNTER — HOSPITAL ENCOUNTER (INPATIENT)
Age: 53
LOS: 2 days | Discharge: HOME | DRG: 392 | End: 2018-11-28
Attending: EMERGENCY MEDICINE | Admitting: FAMILY MEDICINE
Payer: COMMERCIAL

## 2018-11-26 VITALS
BODY MASS INDEX: 33.29 KG/M2 | OXYGEN SATURATION: 95 % | TEMPERATURE: 98.3 F | HEIGHT: 65 IN | DIASTOLIC BLOOD PRESSURE: 76 MMHG | HEART RATE: 120 BPM | SYSTOLIC BLOOD PRESSURE: 122 MMHG | WEIGHT: 199.8 LBS

## 2018-11-26 DIAGNOSIS — Z13.220 SCREENING FOR HYPERLIPIDEMIA: ICD-10-CM

## 2018-11-26 DIAGNOSIS — E86.0 DEHYDRATION: ICD-10-CM

## 2018-11-26 DIAGNOSIS — Z12.11 SCREENING FOR COLON CANCER: ICD-10-CM

## 2018-11-26 DIAGNOSIS — R10.9 ABDOMINAL PAIN, UNSPECIFIED ABDOMINAL LOCATION: ICD-10-CM

## 2018-11-26 DIAGNOSIS — E11.9 TYPE 2 DIABETES MELLITUS WITHOUT COMPLICATION, WITHOUT LONG-TERM CURRENT USE OF INSULIN (HCC): Primary | ICD-10-CM

## 2018-11-26 DIAGNOSIS — K52.9 GASTROENTERITIS: Primary | ICD-10-CM

## 2018-11-26 LAB
-: NORMAL
ABSOLUTE EOS #: 0 K/UL (ref 0–0.4)
ABSOLUTE IMMATURE GRANULOCYTE: ABNORMAL K/UL (ref 0–0.3)
ABSOLUTE LYMPH #: 0.6 K/UL (ref 1–4.8)
ABSOLUTE MONO #: 0.4 K/UL (ref 0.1–1.2)
ANION GAP SERPL CALCULATED.3IONS-SCNC: 13 MMOL/L (ref 9–17)
BASOPHILS # BLD: 0 % (ref 0–1)
BASOPHILS ABSOLUTE: 0 K/UL (ref 0–0.2)
BETA-HYDROXYBUTYRATE: 0.13 MMOL/L (ref 0.02–0.27)
BUN BLDV-MCNC: 15 MG/DL (ref 6–20)
BUN/CREAT BLD: 26 (ref 9–20)
CALCIUM SERPL-MCNC: 9 MG/DL (ref 8.6–10.4)
CHLORIDE BLD-SCNC: 97 MMOL/L (ref 98–107)
CO2: 28 MMOL/L (ref 20–31)
CREAT SERPL-MCNC: 0.58 MG/DL (ref 0.5–0.9)
DIFFERENTIAL TYPE: ABNORMAL
EKG ATRIAL RATE: 130 BPM
EKG P AXIS: 56 DEGREES
EKG P-R INTERVAL: 124 MS
EKG Q-T INTERVAL: 306 MS
EKG QRS DURATION: 70 MS
EKG QTC CALCULATION (BAZETT): 450 MS
EKG R AXIS: -16 DEGREES
EKG T AXIS: 57 DEGREES
EKG VENTRICULAR RATE: 130 BPM
EOSINOPHILS RELATIVE PERCENT: 0 % (ref 1–7)
GFR AFRICAN AMERICAN: >60 ML/MIN
GFR NON-AFRICAN AMERICAN: >60 ML/MIN
GFR SERPL CREATININE-BSD FRML MDRD: ABNORMAL ML/MIN/{1.73_M2}
GFR SERPL CREATININE-BSD FRML MDRD: ABNORMAL ML/MIN/{1.73_M2}
GLUCOSE BLD-MCNC: 260 MG/DL (ref 70–99)
HCT VFR BLD CALC: 49.3 % (ref 36–46)
HEMOGLOBIN: 16.1 G/DL (ref 12–16)
IMMATURE GRANULOCYTES: ABNORMAL %
LYMPHOCYTES # BLD: 5 % (ref 16–46)
MCH RBC QN AUTO: 29.8 PG (ref 26–34)
MCHC RBC AUTO-ENTMCNC: 32.6 G/DL (ref 31–37)
MCV RBC AUTO: 91.4 FL (ref 80–100)
MONOCYTES # BLD: 3 % (ref 4–11)
NRBC AUTOMATED: ABNORMAL PER 100 WBC
PDW BLD-RTO: 13.8 % (ref 11–14.5)
PLATELET # BLD: 198 K/UL (ref 140–450)
PLATELET ESTIMATE: ABNORMAL
PMV BLD AUTO: 10.7 FL (ref 6–12)
POTASSIUM SERPL-SCNC: 3.7 MMOL/L (ref 3.7–5.3)
RBC # BLD: 5.39 M/UL (ref 4–5.2)
RBC # BLD: ABNORMAL 10*6/UL
REASON FOR REJECTION: NORMAL
SEG NEUTROPHILS: 92 % (ref 43–77)
SEGMENTED NEUTROPHILS ABSOLUTE COUNT: 10.4 K/UL (ref 1.8–7.7)
SODIUM BLD-SCNC: 138 MMOL/L (ref 135–144)
TSH SERPL DL<=0.05 MIU/L-ACNC: 1.79 MIU/L (ref 0.3–5)
WBC # BLD: 11.4 K/UL (ref 3.5–11)
WBC # BLD: ABNORMAL 10*3/UL
ZZ NTE CLEAN UP: ORDERED TEST: NORMAL
ZZ NTE WITH NAME CLEAN UP: SPECIMEN SOURCE: NORMAL

## 2018-11-26 PROCEDURE — 2022F DILAT RTA XM EVC RTNOPTHY: CPT | Performed by: FAMILY MEDICINE

## 2018-11-26 PROCEDURE — 80048 BASIC METABOLIC PNL TOTAL CA: CPT

## 2018-11-26 PROCEDURE — 94760 N-INVAS EAR/PLS OXIMETRY 1: CPT

## 2018-11-26 PROCEDURE — 3017F COLORECTAL CA SCREEN DOC REV: CPT | Performed by: FAMILY MEDICINE

## 2018-11-26 PROCEDURE — 94150 VITAL CAPACITY TEST: CPT

## 2018-11-26 PROCEDURE — 82010 KETONE BODYS QUAN: CPT

## 2018-11-26 PROCEDURE — 85025 COMPLETE CBC W/AUTO DIFF WBC: CPT

## 2018-11-26 PROCEDURE — 96374 THER/PROPH/DIAG INJ IV PUSH: CPT

## 2018-11-26 PROCEDURE — 6360000002 HC RX W HCPCS: Performed by: EMERGENCY MEDICINE

## 2018-11-26 PROCEDURE — 2580000003 HC RX 258: Performed by: NURSE PRACTITIONER

## 2018-11-26 PROCEDURE — 1036F TOBACCO NON-USER: CPT | Performed by: FAMILY MEDICINE

## 2018-11-26 PROCEDURE — 6370000000 HC RX 637 (ALT 250 FOR IP): Performed by: NURSE PRACTITIONER

## 2018-11-26 PROCEDURE — 87040 BLOOD CULTURE FOR BACTERIA: CPT

## 2018-11-26 PROCEDURE — 93005 ELECTROCARDIOGRAM TRACING: CPT

## 2018-11-26 PROCEDURE — 84436 ASSAY OF TOTAL THYROXINE: CPT

## 2018-11-26 PROCEDURE — 6360000004 HC RX CONTRAST MEDICATION: Performed by: EMERGENCY MEDICINE

## 2018-11-26 PROCEDURE — G8417 CALC BMI ABV UP PARAM F/U: HCPCS | Performed by: FAMILY MEDICINE

## 2018-11-26 PROCEDURE — G8484 FLU IMMUNIZE NO ADMIN: HCPCS | Performed by: FAMILY MEDICINE

## 2018-11-26 PROCEDURE — 2580000003 HC RX 258: Performed by: EMERGENCY MEDICINE

## 2018-11-26 PROCEDURE — 83630 LACTOFERRIN FECAL (QUAL): CPT

## 2018-11-26 PROCEDURE — 99214 OFFICE O/P EST MOD 30 MIN: CPT | Performed by: FAMILY MEDICINE

## 2018-11-26 PROCEDURE — 3046F HEMOGLOBIN A1C LEVEL >9.0%: CPT | Performed by: FAMILY MEDICINE

## 2018-11-26 PROCEDURE — 84443 ASSAY THYROID STIM HORMONE: CPT

## 2018-11-26 PROCEDURE — 1200000000 HC SEMI PRIVATE

## 2018-11-26 PROCEDURE — 99285 EMERGENCY DEPT VISIT HI MDM: CPT

## 2018-11-26 PROCEDURE — G8427 DOCREV CUR MEDS BY ELIG CLIN: HCPCS | Performed by: FAMILY MEDICINE

## 2018-11-26 PROCEDURE — 94664 DEMO&/EVAL PT USE INHALER: CPT

## 2018-11-26 PROCEDURE — 74177 CT ABD & PELVIS W/CONTRAST: CPT

## 2018-11-26 PROCEDURE — 36415 COLL VENOUS BLD VENIPUNCTURE: CPT

## 2018-11-26 PROCEDURE — 96361 HYDRATE IV INFUSION ADD-ON: CPT

## 2018-11-26 RX ORDER — BENZONATATE 100 MG/1
100 CAPSULE ORAL 3 TIMES DAILY
Status: DISCONTINUED | OUTPATIENT
Start: 2018-11-26 | End: 2018-11-28 | Stop reason: HOSPADM

## 2018-11-26 RX ORDER — ALBUTEROL SULFATE 2.5 MG/3ML
2.5 SOLUTION RESPIRATORY (INHALATION)
Status: DISCONTINUED | OUTPATIENT
Start: 2018-11-26 | End: 2018-11-28 | Stop reason: HOSPADM

## 2018-11-26 RX ORDER — FUROSEMIDE 20 MG/1
20 TABLET ORAL DAILY
Status: DISCONTINUED | OUTPATIENT
Start: 2018-11-27 | End: 2018-11-28 | Stop reason: HOSPADM

## 2018-11-26 RX ORDER — LORAZEPAM 0.5 MG/1
0.5 TABLET ORAL NIGHTLY
Status: DISCONTINUED | OUTPATIENT
Start: 2018-11-26 | End: 2018-11-28 | Stop reason: HOSPADM

## 2018-11-26 RX ORDER — SODIUM CHLORIDE 9 MG/ML
INJECTION, SOLUTION INTRAVENOUS CONTINUOUS
Status: DISCONTINUED | OUTPATIENT
Start: 2018-11-26 | End: 2018-11-28 | Stop reason: HOSPADM

## 2018-11-26 RX ORDER — CETIRIZINE HYDROCHLORIDE 5 MG/1
10 TABLET ORAL DAILY
Status: DISCONTINUED | OUTPATIENT
Start: 2018-11-27 | End: 2018-11-28 | Stop reason: HOSPADM

## 2018-11-26 RX ORDER — SUCRALFATE 1 G/1
1 TABLET ORAL EVERY 6 HOURS SCHEDULED
Status: DISCONTINUED | OUTPATIENT
Start: 2018-11-27 | End: 2018-11-28 | Stop reason: HOSPADM

## 2018-11-26 RX ORDER — MONTELUKAST SODIUM 10 MG/1
10 TABLET ORAL DAILY
Status: DISCONTINUED | OUTPATIENT
Start: 2018-11-27 | End: 2018-11-28 | Stop reason: HOSPADM

## 2018-11-26 RX ORDER — HYDROXYZINE HYDROCHLORIDE 25 MG/1
25 TABLET, FILM COATED ORAL 3 TIMES DAILY PRN
Status: DISCONTINUED | OUTPATIENT
Start: 2018-11-26 | End: 2018-11-28 | Stop reason: HOSPADM

## 2018-11-26 RX ORDER — NICOTINE POLACRILEX 4 MG
15 LOZENGE BUCCAL PRN
Status: DISCONTINUED | OUTPATIENT
Start: 2018-11-26 | End: 2018-11-28 | Stop reason: HOSPADM

## 2018-11-26 RX ORDER — PREDNISONE 1 MG/1
5 TABLET ORAL 2 TIMES DAILY
Status: DISCONTINUED | OUTPATIENT
Start: 2018-11-26 | End: 2018-11-28 | Stop reason: HOSPADM

## 2018-11-26 RX ORDER — SODIUM CHLORIDE 0.9 % (FLUSH) 0.9 %
10 SYRINGE (ML) INJECTION PRN
Status: DISCONTINUED | OUTPATIENT
Start: 2018-11-26 | End: 2018-11-28 | Stop reason: HOSPADM

## 2018-11-26 RX ORDER — SENNA PLUS 8.6 MG/1
1 TABLET ORAL 2 TIMES DAILY
Status: DISCONTINUED | OUTPATIENT
Start: 2018-11-26 | End: 2018-11-28 | Stop reason: HOSPADM

## 2018-11-26 RX ORDER — POTASSIUM CHLORIDE 7.45 MG/ML
10 INJECTION INTRAVENOUS PRN
Status: DISCONTINUED | OUTPATIENT
Start: 2018-11-26 | End: 2018-11-28 | Stop reason: HOSPADM

## 2018-11-26 RX ORDER — BISACODYL 10 MG
10 SUPPOSITORY, RECTAL RECTAL DAILY PRN
Status: DISCONTINUED | OUTPATIENT
Start: 2018-11-26 | End: 2018-11-28 | Stop reason: HOSPADM

## 2018-11-26 RX ORDER — ALBUTEROL SULFATE 90 UG/1
2 AEROSOL, METERED RESPIRATORY (INHALATION) EVERY 6 HOURS PRN
Status: DISCONTINUED | OUTPATIENT
Start: 2018-11-26 | End: 2018-11-28 | Stop reason: HOSPADM

## 2018-11-26 RX ORDER — DEXTROSE MONOHYDRATE 50 MG/ML
100 INJECTION, SOLUTION INTRAVENOUS PRN
Status: DISCONTINUED | OUTPATIENT
Start: 2018-11-26 | End: 2018-11-28 | Stop reason: HOSPADM

## 2018-11-26 RX ORDER — DEXTROSE MONOHYDRATE 25 G/50ML
12.5 INJECTION, SOLUTION INTRAVENOUS PRN
Status: DISCONTINUED | OUTPATIENT
Start: 2018-11-26 | End: 2018-11-28 | Stop reason: HOSPADM

## 2018-11-26 RX ORDER — POTASSIUM CHLORIDE 20 MEQ/1
40 TABLET, EXTENDED RELEASE ORAL PRN
Status: DISCONTINUED | OUTPATIENT
Start: 2018-11-26 | End: 2018-11-28 | Stop reason: HOSPADM

## 2018-11-26 RX ORDER — ONDANSETRON 2 MG/ML
4 INJECTION INTRAMUSCULAR; INTRAVENOUS ONCE
Status: COMPLETED | OUTPATIENT
Start: 2018-11-26 | End: 2018-11-26

## 2018-11-26 RX ORDER — HYDROXYZINE PAMOATE 25 MG/1
25 CAPSULE ORAL 3 TIMES DAILY PRN
Qty: 30 CAPSULE | Refills: 1 | Status: SHIPPED | OUTPATIENT
Start: 2018-11-26 | End: 2019-06-13 | Stop reason: SDUPTHER

## 2018-11-26 RX ORDER — FLUTICASONE PROPIONATE 110 UG/1
2 AEROSOL, METERED RESPIRATORY (INHALATION) 2 TIMES DAILY
Status: DISCONTINUED | OUTPATIENT
Start: 2018-11-26 | End: 2018-11-28 | Stop reason: HOSPADM

## 2018-11-26 RX ORDER — LIDOCAINE 50 MG/G
1 PATCH TOPICAL DAILY
Status: DISCONTINUED | OUTPATIENT
Start: 2018-11-27 | End: 2018-11-28 | Stop reason: HOSPADM

## 2018-11-26 RX ORDER — ONDANSETRON 2 MG/ML
4 INJECTION INTRAMUSCULAR; INTRAVENOUS EVERY 6 HOURS PRN
Status: DISCONTINUED | OUTPATIENT
Start: 2018-11-26 | End: 2018-11-28 | Stop reason: HOSPADM

## 2018-11-26 RX ORDER — NICOTINE 21 MG/24HR
1 PATCH, TRANSDERMAL 24 HOURS TRANSDERMAL DAILY PRN
Status: DISCONTINUED | OUTPATIENT
Start: 2018-11-26 | End: 2018-11-28 | Stop reason: HOSPADM

## 2018-11-26 RX ORDER — ONDANSETRON 4 MG/1
4 TABLET, ORALLY DISINTEGRATING ORAL EVERY 8 HOURS PRN
Qty: 30 TABLET | Refills: 2 | Status: SHIPPED | OUTPATIENT
Start: 2018-11-26 | End: 2019-09-05 | Stop reason: SDUPTHER

## 2018-11-26 RX ORDER — ACETAMINOPHEN 325 MG/1
650 TABLET ORAL EVERY 4 HOURS PRN
Status: DISCONTINUED | OUTPATIENT
Start: 2018-11-26 | End: 2018-11-28 | Stop reason: HOSPADM

## 2018-11-26 RX ORDER — MAGNESIUM SULFATE 1 G/100ML
1 INJECTION INTRAVENOUS PRN
Status: DISCONTINUED | OUTPATIENT
Start: 2018-11-26 | End: 2018-11-28 | Stop reason: HOSPADM

## 2018-11-26 RX ORDER — PANTOPRAZOLE SODIUM 40 MG/1
40 TABLET, DELAYED RELEASE ORAL
Status: DISCONTINUED | OUTPATIENT
Start: 2018-11-27 | End: 2018-11-28 | Stop reason: HOSPADM

## 2018-11-26 RX ORDER — HYDROXYCHLOROQUINE SULFATE 200 MG/1
200 TABLET, FILM COATED ORAL DAILY
Status: DISCONTINUED | OUTPATIENT
Start: 2018-11-27 | End: 2018-11-28 | Stop reason: HOSPADM

## 2018-11-26 RX ORDER — 0.9 % SODIUM CHLORIDE 0.9 %
1000 INTRAVENOUS SOLUTION INTRAVENOUS ONCE
Status: COMPLETED | OUTPATIENT
Start: 2018-11-26 | End: 2018-11-26

## 2018-11-26 RX ORDER — SODIUM CHLORIDE 0.9 % (FLUSH) 0.9 %
10 SYRINGE (ML) INJECTION EVERY 12 HOURS SCHEDULED
Status: DISCONTINUED | OUTPATIENT
Start: 2018-11-26 | End: 2018-11-28 | Stop reason: HOSPADM

## 2018-11-26 RX ORDER — POTASSIUM CHLORIDE 20MEQ/15ML
40 LIQUID (ML) ORAL PRN
Status: DISCONTINUED | OUTPATIENT
Start: 2018-11-26 | End: 2018-11-28 | Stop reason: HOSPADM

## 2018-11-26 RX ADMIN — ONDANSETRON 4 MG: 2 INJECTION INTRAMUSCULAR; INTRAVENOUS at 18:50

## 2018-11-26 RX ADMIN — IOPAMIDOL 100 ML: 755 INJECTION, SOLUTION INTRAVENOUS at 19:37

## 2018-11-26 RX ADMIN — ACETAMINOPHEN 650 MG: 325 TABLET ORAL at 23:50

## 2018-11-26 RX ADMIN — BENZONATATE 100 MG: 100 CAPSULE ORAL at 23:50

## 2018-11-26 RX ADMIN — SODIUM CHLORIDE 1000 ML: 9 INJECTION, SOLUTION INTRAVENOUS at 20:51

## 2018-11-26 RX ADMIN — SUCRALFATE 1 G: 1 TABLET ORAL at 23:50

## 2018-11-26 RX ADMIN — SODIUM CHLORIDE 1000 ML: 9 INJECTION, SOLUTION INTRAVENOUS at 18:49

## 2018-11-26 RX ADMIN — HYDROMORPHONE HYDROCHLORIDE 1 MG: 1 INJECTION, SOLUTION INTRAMUSCULAR; INTRAVENOUS; SUBCUTANEOUS at 20:52

## 2018-11-26 RX ADMIN — PREDNISONE 5 MG: 5 TABLET ORAL at 23:50

## 2018-11-26 RX ADMIN — Medication 10 ML: at 23:50

## 2018-11-26 RX ADMIN — ONDANSETRON 4 MG: 2 INJECTION INTRAMUSCULAR; INTRAVENOUS at 20:51

## 2018-11-26 ASSESSMENT — PAIN DESCRIPTION - ORIENTATION
ORIENTATION: LOWER
ORIENTATION: LOWER

## 2018-11-26 ASSESSMENT — ENCOUNTER SYMPTOMS
CONSTIPATION: 0
ABDOMINAL PAIN: 1
DIARRHEA: 0
SHORTNESS OF BREATH: 0
CHEST TIGHTNESS: 0
COUGH: 0
NAUSEA: 1
WHEEZING: 0

## 2018-11-26 ASSESSMENT — PAIN DESCRIPTION - PAIN TYPE
TYPE: CHRONIC PAIN
TYPE: CHRONIC PAIN

## 2018-11-26 ASSESSMENT — PAIN SCALES - GENERAL
PAINLEVEL_OUTOF10: 10
PAINLEVEL_OUTOF10: 4
PAINLEVEL_OUTOF10: 4
PAINLEVEL_OUTOF10: 3

## 2018-11-26 ASSESSMENT — PAIN DESCRIPTION - LOCATION
LOCATION: BACK
LOCATION: BACK

## 2018-11-26 NOTE — PROGRESS NOTES
exhibits no edema. Lymphadenopathy:     She has no cervical adenopathy. Neurological: She is alert and oriented to person, place, and time. Skin: Skin is warm and dry. No rash noted. No erythema. Nursing note and vitals reviewed. Monofilament Exam Reveals:  Pulses: normal  Edema:normal  Skin Lesions: calluses on each foot    Right Foot:      Normal sensation at 1, 2, 3, 4, 5, 6, 7, 8, 9 and 10  Diminished sensation at 0  No sensation at 0       Left Foot:  Normal sensation at 1, 2, 3, 4, 5, 6, 7, 8, 9 and 10  Diminished sensation at 0  No sensation at 0            /76 (Site: Left Upper Arm, Position: Sitting, Cuff Size: Medium Adult)   Pulse 120   Temp 98.3 °F (36.8 °C) (Tympanic)   Ht 5' 5\" (1.651 m)   Wt 199 lb 12.8 oz (90.6 kg)   LMP 04/12/2012   SpO2 95%   BMI 33.25 kg/m²     Assessment:       Diagnosis Orders   1. Type 2 diabetes mellitus without complication, without long-term current use of insulin ([facility])   DIABETES FOOT EXAM    Basic Metabolic Panel    Hemoglobin A1C   2. Screening for colon cancer  Gabino Fisher MD, General Surgery [place]   3. Screening for hyperlipidemia  Lipid Panel      Hospital Outpatient Visit on 11/14/2018   Component Date Value Ref Range Status    Microalb, Ur 11/14/2018 13  <21 mg/L Final    Creatinine, Ur 11/14/2018 178.4  28.0 - 217.0 mg/dL Final    Microalb/Crt.  Ratio 11/14/2018 7  <25 mcg/mg creat Final   Hospital Outpatient Visit on 11/14/2018   Component Date Value Ref Range Status    Hemoglobin A1C 11/14/2018 9.3* 4.8 - 5.9 % Final    Estimated Avg Glucose 11/14/2018 220  mg/dL Final    Glucose 11/14/2018 164* 70 - 99 mg/dL Final    BUN 11/14/2018 12  6 - 20 mg/dL Final    CREATININE 11/14/2018 0.53  0.50 - 0.90 mg/dL Final    Bun/Cre Ratio 11/14/2018 23* 9 - 20 Final    Calcium 11/14/2018 9.5  8.6 - 10.4 mg/dL Final    Sodium 11/14/2018 141  135 - 144 mmol/L Final    Potassium 11/14/2018 3.9  3.7 - 5.3 mmol/L Final    Chloride

## 2018-11-26 NOTE — ED PROVIDER NOTES
(TESSALON PERLES) 100 MG CAPSULE    Take 1 capsule by mouth 3 times daily    BUPROPION (WELLBUTRIN SR) 150 MG EXTENDED RELEASE TABLET    TAKE 1 TABLET BY MOUTH 2 TIMES DAILY    CHOLECALCIFEROL (VITAMIN D) 2000 UNITS CAPS CAPSULE    Take 2,000 Units by mouth 2 times daily    CLINDAMYCIN (CLINDAGEL) 1 % GEL    clindamycin 1 % topical gel    DICLOFENAC SODIUM (VOLTAREN) 1 % GEL    Apply 4 g topically 4 times daily as needed    ESOMEPRAZOLE (NEXIUM) 40 MG DELAYED RELEASE CAPSULE    TAKE 1 CAPSULE TWICE DAILY    ESZOPICLONE (LUNESTA) 3 MG TABS    Take 1 tablet by mouth nightly for 360 days. Jada Machado FLUTICASONE (FLOVENT HFA) 110 MCG/ACT INHALER    Inhale 2 puffs into the lungs 2 times daily    FUROSEMIDE (LASIX) 20 MG TABLET    Take 1 tablet by mouth daily    GABAPENTIN (NEURONTIN) 400 MG CAPSULE    Take 400 mg by mouth 1 cap q AM, 2 caps q HS    HYDROCODONE-ACETAMINOPHEN (NORCO)  MG PER TABLET    Take 1 tablet by mouth every 6 hours as needed     HYDROXYCHLOROQUINE (PLAQUENIL) 200 MG TABLET      Take by mouth One every morning and 2 every night    HYDROXYZINE (VISTARIL) 25 MG CAPSULE    Take 1 capsule by mouth 3 times daily as needed for Anxiety    LIDOCAINE (LIDODERM) 5 %    Place 1 patch onto the skin daily 12 hours on, 12 hours off. LORATADINE 10 MG CAPS    Take by mouth    LORAZEPAM (ATIVAN) 0.5 MG TABLET    Take 1 tablet by mouth daily as needed. MELOXICAM (MOBIC) 15 MG TABLET    Take 15 mg by mouth daily    METFORMIN (GLUCOPHAGE) 1000 MG TABLET    Take 1 tablet by mouth 2 times daily (with meals) Take 1/2 a tablet twice a day for the first 2 weeks. MONTELUKAST (SINGULAIR) 10 MG TABLET    TAKE 1 TABLET BY MOUTH DAILY    MORPHINE (MS CONTIN) 15 MG EXTENDED RELEASE TABLET    Take 15 mg by mouth 2 times daily .     ONDANSETRON (ZOFRAN ODT) 4 MG DISINTEGRATING TABLET    Take 1 tablet by mouth every 8 hours as needed for Nausea or Vomiting    PREDNISONE (DELTASONE) 5 MG TABLET    Take 5 mg by mouth 2 times

## 2018-11-27 LAB
-: NORMAL
ALBUMIN SERPL-MCNC: 3.4 G/DL (ref 3.5–5.2)
ALBUMIN/GLOBULIN RATIO: 0.8 (ref 1–2.5)
ALP BLD-CCNC: 137 U/L (ref 35–104)
ALT SERPL-CCNC: 39 U/L (ref 5–33)
AMORPHOUS: NORMAL
AMYLASE: 34 U/L (ref 28–100)
ANION GAP SERPL CALCULATED.3IONS-SCNC: 14 MMOL/L (ref 9–17)
AST SERPL-CCNC: 39 U/L
BACTERIA: NORMAL
BILIRUB SERPL-MCNC: 0.81 MG/DL (ref 0.3–1.2)
BILIRUBIN URINE: NEGATIVE
BUN BLDV-MCNC: 14 MG/DL (ref 6–20)
BUN/CREAT BLD: 29 (ref 9–20)
C DIFF AG + TOXIN: NORMAL
C-PEPTIDE: 3.1 NG/ML (ref 1.1–4.4)
CALCIUM SERPL-MCNC: 8.7 MG/DL (ref 8.6–10.4)
CASTS UA: NORMAL /LPF (ref 0–2)
CHLORIDE BLD-SCNC: 101 MMOL/L (ref 98–107)
CO2: 23 MMOL/L (ref 20–31)
COLOR: ABNORMAL
COMMENT UA: ABNORMAL
CREAT SERPL-MCNC: 0.49 MG/DL (ref 0.5–0.9)
CRYSTALS, UA: NORMAL /HPF
DATE, STOOL #1: ABNORMAL
DATE, STOOL #2: ABNORMAL
DATE, STOOL #3: ABNORMAL
DIRECT EXAM: NORMAL
EPITHELIAL CELLS UA: NORMAL /HPF (ref 0–5)
ESTIMATED AVERAGE GLUCOSE: 232 MG/DL
GFR AFRICAN AMERICAN: >60 ML/MIN
GFR NON-AFRICAN AMERICAN: >60 ML/MIN
GFR SERPL CREATININE-BSD FRML MDRD: ABNORMAL ML/MIN/{1.73_M2}
GFR SERPL CREATININE-BSD FRML MDRD: ABNORMAL ML/MIN/{1.73_M2}
GLUCOSE BLD-MCNC: 171 MG/DL (ref 65–105)
GLUCOSE BLD-MCNC: 173 MG/DL (ref 65–105)
GLUCOSE BLD-MCNC: 250 MG/DL (ref 70–99)
GLUCOSE BLD-MCNC: 98 MG/DL (ref 65–105)
GLUCOSE URINE: NEGATIVE
HBA1C MFR BLD: 9.7 % (ref 4.8–5.9)
HCT VFR BLD CALC: 51.2 % (ref 36–46)
HEMOCCULT SP1 STL QL: POSITIVE
HEMOCCULT SP2 STL QL: ABNORMAL
HEMOCCULT SP3 STL QL: ABNORMAL
HEMOGLOBIN: 16.5 G/DL (ref 12–16)
KETONES, URINE: NEGATIVE
LACTOFERRIN, QUAL: ABNORMAL
LEUKOCYTE ESTERASE, URINE: NEGATIVE
LIPASE: 25 U/L (ref 13–60)
Lab: NORMAL
MAGNESIUM: 1.9 MG/DL (ref 1.6–2.6)
MCH RBC QN AUTO: 29.6 PG (ref 26–34)
MCHC RBC AUTO-ENTMCNC: 32.2 G/DL (ref 31–37)
MCV RBC AUTO: 91.9 FL (ref 80–100)
MUCUS: NORMAL
NITRITE, URINE: NEGATIVE
NRBC AUTOMATED: ABNORMAL PER 100 WBC
OTHER OBSERVATIONS UA: NORMAL
PDW BLD-RTO: 13.5 % (ref 11–14.5)
PH UA: 6.5 (ref 5–6)
PHOSPHORUS: 4.3 MG/DL (ref 2.6–4.5)
PLATELET # BLD: 204 K/UL (ref 140–450)
PMV BLD AUTO: 10.5 FL (ref 6–12)
POTASSIUM SERPL-SCNC: 4.5 MMOL/L (ref 3.7–5.3)
PROTEIN UA: NEGATIVE
RBC # BLD: 5.57 M/UL (ref 4–5.2)
RBC UA: NORMAL /HPF (ref 0–4)
RENAL EPITHELIAL, UA: NORMAL /HPF
SODIUM BLD-SCNC: 138 MMOL/L (ref 135–144)
SPECIFIC GRAVITY UA: 1.01 (ref 1.01–1.02)
SPECIMEN DESCRIPTION: NORMAL
SPECIMEN DESCRIPTION: NORMAL
STATUS: NORMAL
TIME, STOOL #1: 945
TIME, STOOL #2: ABNORMAL
TIME, STOOL #3: ABNORMAL
TOTAL PROTEIN: 7.9 G/DL (ref 6.4–8.3)
TRICHOMONAS: NORMAL
TURBIDITY: ABNORMAL
URINE HGB: NEGATIVE
UROBILINOGEN, URINE: NORMAL
WBC # BLD: 9.6 K/UL (ref 3.5–11)
WBC UA: NORMAL /HPF (ref 0–4)
YEAST: NORMAL

## 2018-11-27 PROCEDURE — 84100 ASSAY OF PHOSPHORUS: CPT

## 2018-11-27 PROCEDURE — 82274 ASSAY TEST FOR BLOOD FECAL: CPT

## 2018-11-27 PROCEDURE — 87324 CLOSTRIDIUM AG IA: CPT

## 2018-11-27 PROCEDURE — 82947 ASSAY GLUCOSE BLOOD QUANT: CPT

## 2018-11-27 PROCEDURE — 83690 ASSAY OF LIPASE: CPT

## 2018-11-27 PROCEDURE — 99232 SBSQ HOSP IP/OBS MODERATE 35: CPT | Performed by: INTERNAL MEDICINE

## 2018-11-27 PROCEDURE — 82150 ASSAY OF AMYLASE: CPT

## 2018-11-27 PROCEDURE — 87329 GIARDIA AG IA: CPT

## 2018-11-27 PROCEDURE — 6360000002 HC RX W HCPCS: Performed by: NURSE PRACTITIONER

## 2018-11-27 PROCEDURE — 87205 SMEAR GRAM STAIN: CPT

## 2018-11-27 PROCEDURE — 94761 N-INVAS EAR/PLS OXIMETRY MLT: CPT

## 2018-11-27 PROCEDURE — 85027 COMPLETE CBC AUTOMATED: CPT

## 2018-11-27 PROCEDURE — 84681 ASSAY OF C-PEPTIDE: CPT

## 2018-11-27 PROCEDURE — 83735 ASSAY OF MAGNESIUM: CPT

## 2018-11-27 PROCEDURE — 6360000002 HC RX W HCPCS

## 2018-11-27 PROCEDURE — 83036 HEMOGLOBIN GLYCOSYLATED A1C: CPT

## 2018-11-27 PROCEDURE — 87449 NOS EACH ORGANISM AG IA: CPT

## 2018-11-27 PROCEDURE — 80053 COMPREHEN METABOLIC PANEL: CPT

## 2018-11-27 PROCEDURE — 1200000000 HC SEMI PRIVATE

## 2018-11-27 PROCEDURE — 2580000003 HC RX 258: Performed by: NURSE PRACTITIONER

## 2018-11-27 PROCEDURE — 6370000000 HC RX 637 (ALT 250 FOR IP): Performed by: INTERNAL MEDICINE

## 2018-11-27 PROCEDURE — 6370000000 HC RX 637 (ALT 250 FOR IP): Performed by: NURSE PRACTITIONER

## 2018-11-27 PROCEDURE — 2580000003 HC RX 258: Performed by: INTERNAL MEDICINE

## 2018-11-27 PROCEDURE — 81001 URINALYSIS AUTO W/SCOPE: CPT

## 2018-11-27 PROCEDURE — 36415 COLL VENOUS BLD VENIPUNCTURE: CPT

## 2018-11-27 RX ORDER — KETOROLAC TROMETHAMINE 30 MG/ML
30 INJECTION, SOLUTION INTRAMUSCULAR; INTRAVENOUS EVERY 6 HOURS PRN
Status: DISCONTINUED | OUTPATIENT
Start: 2018-11-27 | End: 2018-11-28 | Stop reason: HOSPADM

## 2018-11-27 RX ORDER — DEXTROSE MONOHYDRATE 50 MG/ML
100 INJECTION, SOLUTION INTRAVENOUS PRN
Status: DISCONTINUED | OUTPATIENT
Start: 2018-11-27 | End: 2018-11-27 | Stop reason: SDUPTHER

## 2018-11-27 RX ORDER — MELOXICAM 15 MG/1
15 TABLET ORAL DAILY
Status: DISCONTINUED | OUTPATIENT
Start: 2018-11-27 | End: 2018-11-28 | Stop reason: HOSPADM

## 2018-11-27 RX ORDER — 0.9 % SODIUM CHLORIDE 0.9 %
1000 INTRAVENOUS SOLUTION INTRAVENOUS ONCE
Status: COMPLETED | OUTPATIENT
Start: 2018-11-27 | End: 2018-11-27

## 2018-11-27 RX ORDER — INSULIN GLARGINE 100 [IU]/ML
10 INJECTION, SOLUTION SUBCUTANEOUS DAILY
Status: DISCONTINUED | OUTPATIENT
Start: 2018-11-27 | End: 2018-11-28 | Stop reason: HOSPADM

## 2018-11-27 RX ORDER — DEXTROSE MONOHYDRATE 25 G/50ML
12.5 INJECTION, SOLUTION INTRAVENOUS PRN
Status: DISCONTINUED | OUTPATIENT
Start: 2018-11-27 | End: 2018-11-27 | Stop reason: SDUPTHER

## 2018-11-27 RX ORDER — KETOROLAC TROMETHAMINE 30 MG/ML
INJECTION, SOLUTION INTRAMUSCULAR; INTRAVENOUS
Status: COMPLETED
Start: 2018-11-27 | End: 2018-11-27

## 2018-11-27 RX ORDER — MORPHINE SULFATE 15 MG/1
15 TABLET, FILM COATED, EXTENDED RELEASE ORAL 2 TIMES DAILY
Status: DISCONTINUED | OUTPATIENT
Start: 2018-11-27 | End: 2018-11-28 | Stop reason: HOSPADM

## 2018-11-27 RX ORDER — NICOTINE POLACRILEX 4 MG
15 LOZENGE BUCCAL PRN
Status: DISCONTINUED | OUTPATIENT
Start: 2018-11-27 | End: 2018-11-27 | Stop reason: SDUPTHER

## 2018-11-27 RX ORDER — HYDROCODONE BITARTRATE AND ACETAMINOPHEN 5; 325 MG/1; MG/1
2 TABLET ORAL EVERY 6 HOURS PRN
Status: DISCONTINUED | OUTPATIENT
Start: 2018-11-27 | End: 2018-11-28 | Stop reason: HOSPADM

## 2018-11-27 RX ADMIN — INSULIN LISPRO 3 UNITS: 100 INJECTION, SOLUTION INTRAVENOUS; SUBCUTANEOUS at 11:21

## 2018-11-27 RX ADMIN — KETOROLAC TROMETHAMINE 30 MG: 30 INJECTION, SOLUTION INTRAMUSCULAR at 04:39

## 2018-11-27 RX ADMIN — SUCRALFATE 1 G: 1 TABLET ORAL at 04:39

## 2018-11-27 RX ADMIN — ONDANSETRON 4 MG: 2 INJECTION INTRAMUSCULAR; INTRAVENOUS at 05:35

## 2018-11-27 RX ADMIN — MORPHINE SULFATE 15 MG: 15 TABLET, FILM COATED, EXTENDED RELEASE ORAL at 21:36

## 2018-11-27 RX ADMIN — PREDNISONE 5 MG: 5 TABLET ORAL at 07:53

## 2018-11-27 RX ADMIN — SENNOSIDES 8.6 MG: 8.6 TABLET, FILM COATED ORAL at 07:53

## 2018-11-27 RX ADMIN — MORPHINE SULFATE 15 MG: 15 TABLET, FILM COATED, EXTENDED RELEASE ORAL at 10:54

## 2018-11-27 RX ADMIN — SODIUM CHLORIDE 1000 ML: 9 INJECTION, SOLUTION INTRAVENOUS at 11:01

## 2018-11-27 RX ADMIN — BENZONATATE 100 MG: 100 CAPSULE ORAL at 21:29

## 2018-11-27 RX ADMIN — FUROSEMIDE 20 MG: 20 TABLET ORAL at 07:54

## 2018-11-27 RX ADMIN — INSULIN GLARGINE 10 UNITS: 100 INJECTION, SOLUTION SUBCUTANEOUS at 10:56

## 2018-11-27 RX ADMIN — Medication 10 ML: at 21:28

## 2018-11-27 RX ADMIN — MONTELUKAST SODIUM 10 MG: 10 TABLET, FILM COATED ORAL at 07:54

## 2018-11-27 RX ADMIN — PREDNISONE 5 MG: 5 TABLET ORAL at 21:29

## 2018-11-27 RX ADMIN — BENZONATATE 100 MG: 100 CAPSULE ORAL at 14:22

## 2018-11-27 RX ADMIN — VITAMIN D, TAB 1000IU (100/BT) 2000 UNITS: 25 TAB at 07:53

## 2018-11-27 RX ADMIN — INSULIN LISPRO 3 UNITS: 100 INJECTION, SOLUTION INTRAVENOUS; SUBCUTANEOUS at 17:49

## 2018-11-27 RX ADMIN — INSULIN LISPRO 1 UNITS: 100 INJECTION, SOLUTION INTRAVENOUS; SUBCUTANEOUS at 11:21

## 2018-11-27 RX ADMIN — SODIUM CHLORIDE: 9 INJECTION, SOLUTION INTRAVENOUS at 15:36

## 2018-11-27 RX ADMIN — SODIUM CHLORIDE: 9 INJECTION, SOLUTION INTRAVENOUS at 04:39

## 2018-11-27 RX ADMIN — BENZONATATE 100 MG: 100 CAPSULE ORAL at 07:53

## 2018-11-27 RX ADMIN — HYDROCODONE BITARTRATE AND ACETAMINOPHEN 2 TABLET: 5; 325 TABLET ORAL at 11:23

## 2018-11-27 RX ADMIN — HYDROXYCHLOROQUINE SULFATE 200 MG: 200 TABLET, FILM COATED ORAL at 07:53

## 2018-11-27 RX ADMIN — INSULIN LISPRO 1 UNITS: 100 INJECTION, SOLUTION INTRAVENOUS; SUBCUTANEOUS at 17:50

## 2018-11-27 RX ADMIN — SUCRALFATE 1 G: 1 TABLET ORAL at 11:21

## 2018-11-27 RX ADMIN — SUCRALFATE 1 G: 1 TABLET ORAL at 17:49

## 2018-11-27 RX ADMIN — ENOXAPARIN SODIUM 40 MG: 40 INJECTION SUBCUTANEOUS at 07:54

## 2018-11-27 RX ADMIN — GABAPENTIN 400 MG: 300 CAPSULE ORAL at 10:54

## 2018-11-27 RX ADMIN — VITAMIN D, TAB 1000IU (100/BT) 2000 UNITS: 25 TAB at 21:29

## 2018-11-27 RX ADMIN — SODIUM CHLORIDE 1000 ML: 9 INJECTION, SOLUTION INTRAVENOUS at 12:13

## 2018-11-27 RX ADMIN — PANTOPRAZOLE SODIUM 40 MG: 40 TABLET, DELAYED RELEASE ORAL at 07:54

## 2018-11-27 RX ADMIN — GABAPENTIN 800 MG: 100 CAPSULE ORAL at 21:28

## 2018-11-27 RX ADMIN — CETIRIZINE HYDROCHLORIDE 10 MG: 5 TABLET, FILM COATED ORAL at 07:53

## 2018-11-27 RX ADMIN — HYDROCODONE BITARTRATE AND ACETAMINOPHEN 2 TABLET: 5; 325 TABLET ORAL at 17:49

## 2018-11-27 ASSESSMENT — PAIN DESCRIPTION - ONSET: ONSET: ON-GOING

## 2018-11-27 ASSESSMENT — PAIN SCALES - GENERAL
PAINLEVEL_OUTOF10: 9
PAINLEVEL_OUTOF10: 6
PAINLEVEL_OUTOF10: 9
PAINLEVEL_OUTOF10: 5
PAINLEVEL_OUTOF10: 9
PAINLEVEL_OUTOF10: 9

## 2018-11-27 ASSESSMENT — PAIN DESCRIPTION - PROGRESSION: CLINICAL_PROGRESSION: GRADUALLY WORSENING

## 2018-11-27 ASSESSMENT — PAIN DESCRIPTION - PAIN TYPE: TYPE: CHRONIC PAIN

## 2018-11-27 ASSESSMENT — PAIN DESCRIPTION - FREQUENCY: FREQUENCY: CONTINUOUS

## 2018-11-27 ASSESSMENT — PAIN DESCRIPTION - DESCRIPTORS: DESCRIPTORS: ACHING

## 2018-11-27 ASSESSMENT — PAIN DESCRIPTION - ORIENTATION: ORIENTATION: LOWER

## 2018-11-27 ASSESSMENT — PAIN DESCRIPTION - LOCATION: LOCATION: BACK

## 2018-11-28 ENCOUNTER — TELEPHONE (OUTPATIENT)
Dept: FAMILY MEDICINE CLINIC | Age: 53
End: 2018-11-28

## 2018-11-28 VITALS
HEIGHT: 65 IN | TEMPERATURE: 97.9 F | WEIGHT: 202.06 LBS | DIASTOLIC BLOOD PRESSURE: 65 MMHG | OXYGEN SATURATION: 97 % | RESPIRATION RATE: 20 BRPM | HEART RATE: 82 BPM | BODY MASS INDEX: 33.66 KG/M2 | SYSTOLIC BLOOD PRESSURE: 114 MMHG

## 2018-11-28 LAB
ABSOLUTE EOS #: 0.1 K/UL (ref 0–0.4)
ABSOLUTE IMMATURE GRANULOCYTE: ABNORMAL K/UL (ref 0–0.3)
ABSOLUTE LYMPH #: 0.9 K/UL (ref 1–4.8)
ABSOLUTE MONO #: 0.5 K/UL (ref 0.1–1.2)
ANION GAP SERPL CALCULATED.3IONS-SCNC: 10 MMOL/L (ref 9–17)
BASOPHILS # BLD: 0 % (ref 0–1)
BASOPHILS ABSOLUTE: 0 K/UL (ref 0–0.2)
BUN BLDV-MCNC: 7 MG/DL (ref 6–20)
BUN/CREAT BLD: 16 (ref 9–20)
CALCIUM SERPL-MCNC: 8.3 MG/DL (ref 8.6–10.4)
CHLORIDE BLD-SCNC: 105 MMOL/L (ref 98–107)
CO2: 25 MMOL/L (ref 20–31)
CREAT SERPL-MCNC: 0.44 MG/DL (ref 0.5–0.9)
DIFFERENTIAL TYPE: ABNORMAL
DIRECT EXAM: NORMAL
EOSINOPHILS RELATIVE PERCENT: 2 % (ref 1–7)
GFR AFRICAN AMERICAN: >60 ML/MIN
GFR NON-AFRICAN AMERICAN: >60 ML/MIN
GFR SERPL CREATININE-BSD FRML MDRD: ABNORMAL ML/MIN/{1.73_M2}
GFR SERPL CREATININE-BSD FRML MDRD: ABNORMAL ML/MIN/{1.73_M2}
GLUCOSE BLD-MCNC: 153 MG/DL (ref 65–105)
GLUCOSE BLD-MCNC: 165 MG/DL (ref 70–99)
HCT VFR BLD CALC: 42.2 % (ref 36–46)
HEMOGLOBIN: 13.5 G/DL (ref 12–16)
IMMATURE GRANULOCYTES: ABNORMAL %
LYMPHOCYTES # BLD: 15 % (ref 16–46)
Lab: NORMAL
MCH RBC QN AUTO: 29.5 PG (ref 26–34)
MCHC RBC AUTO-ENTMCNC: 32.1 G/DL (ref 31–37)
MCV RBC AUTO: 91.9 FL (ref 80–100)
MONOCYTES # BLD: 8 % (ref 4–11)
NRBC AUTOMATED: ABNORMAL PER 100 WBC
PDW BLD-RTO: 13.8 % (ref 11–14.5)
PLATELET # BLD: 155 K/UL (ref 140–450)
PLATELET ESTIMATE: ABNORMAL
PMV BLD AUTO: 10.1 FL (ref 6–12)
POTASSIUM SERPL-SCNC: 4.2 MMOL/L (ref 3.7–5.3)
RBC # BLD: 4.59 M/UL (ref 4–5.2)
RBC # BLD: ABNORMAL 10*6/UL
SEG NEUTROPHILS: 75 % (ref 43–77)
SEGMENTED NEUTROPHILS ABSOLUTE COUNT: 4.6 K/UL (ref 1.8–7.7)
SODIUM BLD-SCNC: 140 MMOL/L (ref 135–144)
SPECIMEN DESCRIPTION: NORMAL
STATUS: NORMAL
T4 TOTAL: 8.6 UG/DL (ref 4.5–12)
WBC # BLD: 6.1 K/UL (ref 3.5–11)
WBC # BLD: ABNORMAL 10*3/UL

## 2018-11-28 PROCEDURE — 82947 ASSAY GLUCOSE BLOOD QUANT: CPT

## 2018-11-28 PROCEDURE — 6370000000 HC RX 637 (ALT 250 FOR IP): Performed by: INTERNAL MEDICINE

## 2018-11-28 PROCEDURE — 85025 COMPLETE CBC W/AUTO DIFF WBC: CPT

## 2018-11-28 PROCEDURE — 99238 HOSP IP/OBS DSCHRG MGMT 30/<: CPT | Performed by: INTERNAL MEDICINE

## 2018-11-28 PROCEDURE — 94760 N-INVAS EAR/PLS OXIMETRY 1: CPT

## 2018-11-28 PROCEDURE — 2580000003 HC RX 258: Performed by: NURSE PRACTITIONER

## 2018-11-28 PROCEDURE — 80048 BASIC METABOLIC PNL TOTAL CA: CPT

## 2018-11-28 PROCEDURE — 6370000000 HC RX 637 (ALT 250 FOR IP): Performed by: NURSE PRACTITIONER

## 2018-11-28 PROCEDURE — 36415 COLL VENOUS BLD VENIPUNCTURE: CPT

## 2018-11-28 PROCEDURE — 6360000002 HC RX W HCPCS: Performed by: NURSE PRACTITIONER

## 2018-11-28 RX ADMIN — HYDROCODONE BITARTRATE AND ACETAMINOPHEN 2 TABLET: 5; 325 TABLET ORAL at 06:44

## 2018-11-28 RX ADMIN — VITAMIN D, TAB 1000IU (100/BT) 2000 UNITS: 25 TAB at 09:05

## 2018-11-28 RX ADMIN — SODIUM CHLORIDE: 9 INJECTION, SOLUTION INTRAVENOUS at 00:12

## 2018-11-28 RX ADMIN — MORPHINE SULFATE 15 MG: 15 TABLET, FILM COATED, EXTENDED RELEASE ORAL at 09:06

## 2018-11-28 RX ADMIN — HYDROXYCHLOROQUINE SULFATE 200 MG: 200 TABLET, FILM COATED ORAL at 09:06

## 2018-11-28 RX ADMIN — ENOXAPARIN SODIUM 40 MG: 40 INJECTION SUBCUTANEOUS at 09:07

## 2018-11-28 RX ADMIN — GABAPENTIN 400 MG: 300 CAPSULE ORAL at 09:05

## 2018-11-28 RX ADMIN — FUROSEMIDE 20 MG: 20 TABLET ORAL at 09:06

## 2018-11-28 RX ADMIN — PANTOPRAZOLE SODIUM 40 MG: 40 TABLET, DELAYED RELEASE ORAL at 05:43

## 2018-11-28 RX ADMIN — KETOROLAC TROMETHAMINE 30 MG: 30 INJECTION, SOLUTION INTRAMUSCULAR at 05:43

## 2018-11-28 RX ADMIN — INSULIN GLARGINE 10 UNITS: 100 INJECTION, SOLUTION SUBCUTANEOUS at 09:07

## 2018-11-28 RX ADMIN — INSULIN LISPRO 1 UNITS: 100 INJECTION, SOLUTION INTRAVENOUS; SUBCUTANEOUS at 09:08

## 2018-11-28 RX ADMIN — CETIRIZINE HYDROCHLORIDE 10 MG: 5 TABLET, FILM COATED ORAL at 09:06

## 2018-11-28 RX ADMIN — SUCRALFATE 1 G: 1 TABLET ORAL at 05:43

## 2018-11-28 RX ADMIN — MONTELUKAST SODIUM 10 MG: 10 TABLET, FILM COATED ORAL at 09:20

## 2018-11-28 RX ADMIN — PREDNISONE 5 MG: 5 TABLET ORAL at 09:06

## 2018-11-28 RX ADMIN — BENZONATATE 100 MG: 100 CAPSULE ORAL at 09:05

## 2018-11-28 ASSESSMENT — PAIN SCALES - GENERAL
PAINLEVEL_OUTOF10: 6
PAINLEVEL_OUTOF10: 6
PAINLEVEL_OUTOF10: 4

## 2018-11-28 NOTE — PROGRESS NOTES
Hospitalist Progress Note    Patient:  Wilfredo Escobar     YOB: 1965    MRN: [id number]   Admit date: 11/26/2018     Acct: [de-identified]     PCP: Chava Renteria MD    CC--Interval History: Gastroenteritis---abdominal pain---nausea--vomiting---loose stool [C difficle--negative]--resolved----home---11.28.2018    Dehydration--resolved    Diabetes Mellitus Type 2---confirmed---patient to be re-started on metformin in outpatient setting----needs to see diabetic educator    BLE edema--resolved    See note below     All other ROS negative except noted in HPI    Diet:  DIET CARB CONTROL; Carb Control: 4 carb choices (60 gms)/meal    Medications:  Scheduled Meds:   insulin glargine  10 Units Subcutaneous Daily    insulin lispro  3 Units Subcutaneous TID WC    insulin lispro  0-6 Units Subcutaneous TID WC    insulin lispro  0-3 Units Subcutaneous Nightly    gabapentin  400 mg Oral Daily    meloxicam  15 mg Oral Daily    morphine  15 mg Oral BID    gabapentin  800 mg Oral Nightly    benzonatate  100 mg Oral TID    vitamin D  2,000 Units Oral BID    pantoprazole  40 mg Oral QAM AC    LORazepam  0.5 mg Oral Nightly    fluticasone  2 puff Inhalation BID    furosemide  20 mg Oral Daily    lidocaine  1 patch Transdermal Daily    cetirizine  10 mg Oral Daily    montelukast  10 mg Oral Daily    hydroxychloroquine  200 mg Oral Daily    predniSONE  5 mg Oral BID    senna  1 tablet Oral BID    sucralfate  1 g Oral 4 times per day    sodium chloride flush  10 mL Intravenous 2 times per day    enoxaparin  40 mg Subcutaneous Daily     Continuous Infusions:   dextrose      sodium chloride 125 mL/hr at 11/28/18 0012     PRN Meds:ketorolac, HYDROcodone 5 mg - acetaminophen, albuterol sulfate HFA, hydrOXYzine, sodium chloride flush, potassium chloride **OR** potassium chloride **OR** potassium chloride, magnesium sulfate, magnesium hydroxide, bisacodyl, ondansetron, nicotine, acetaminophen, glucose, dextrose,

## 2018-11-30 ENCOUNTER — TELEPHONE (OUTPATIENT)
Dept: FAMILY MEDICINE CLINIC | Age: 53
End: 2018-11-30

## 2018-12-03 LAB
CULTURE: NORMAL
Lab: NORMAL
SPECIMEN DESCRIPTION: NORMAL
STATUS: NORMAL

## 2018-12-05 ENCOUNTER — OFFICE VISIT (OUTPATIENT)
Dept: FAMILY MEDICINE CLINIC | Age: 53
End: 2018-12-05
Payer: COMMERCIAL

## 2018-12-05 VITALS
BODY MASS INDEX: 32.99 KG/M2 | OXYGEN SATURATION: 96 % | HEART RATE: 104 BPM | HEIGHT: 65 IN | WEIGHT: 198 LBS | DIASTOLIC BLOOD PRESSURE: 84 MMHG | SYSTOLIC BLOOD PRESSURE: 132 MMHG | TEMPERATURE: 98 F

## 2018-12-05 DIAGNOSIS — J06.9 VIRAL URI: ICD-10-CM

## 2018-12-05 DIAGNOSIS — K52.9 GASTROENTERITIS: Primary | ICD-10-CM

## 2018-12-05 PROCEDURE — 1111F DSCHRG MED/CURRENT MED MERGE: CPT | Performed by: FAMILY MEDICINE

## 2018-12-05 PROCEDURE — 99495 TRANSJ CARE MGMT MOD F2F 14D: CPT | Performed by: FAMILY MEDICINE

## 2018-12-05 ASSESSMENT — ENCOUNTER SYMPTOMS
NAUSEA: 0
WHEEZING: 0
DIARRHEA: 0
SHORTNESS OF BREATH: 0
CHEST TIGHTNESS: 0
COUGH: 0
SORE THROAT: 0
ABDOMINAL PAIN: 0
VOMITING: 0
SINUS PRESSURE: 1
CONSTIPATION: 0

## 2018-12-05 NOTE — PROGRESS NOTES
Cholecalciferol (VITAMIN D) 2000 UNITS CAPS capsule  Take 2,000 Units by mouth 2 times daily             clindamycin (CLINDAGEL) 1 % gel  clindamycin 1 % topical gel             diclofenac sodium (VOLTAREN) 1 % GEL  Apply 4 g topically 4 times daily as needed             esomeprazole (NEXIUM) 40 MG delayed release capsule  TAKE 1 CAPSULE TWICE DAILY             eszopiclone (LUNESTA) 3 MG TABS  Take 1 tablet by mouth nightly for 360 days. .             fluticasone (FLOVENT HFA) 110 MCG/ACT inhaler  Inhale 2 puffs into the lungs 2 times daily             furosemide (LASIX) 20 MG tablet  Take 1 tablet by mouth daily             gabapentin (NEURONTIN) 400 MG capsule  Take 400 mg by mouth 1 cap q AM, 2 caps q HS             HYDROcodone-acetaminophen (NORCO)  MG per tablet  Take 1 tablet by mouth 4 times daily. .             hydroxychloroquine (PLAQUENIL) 200 MG tablet    Take by mouth One every morning and 2 every night             hydrOXYzine (VISTARIL) 25 MG capsule  Take 1 capsule by mouth 3 times daily as needed for Anxiety             lidocaine (LIDODERM) 5 %  Place 1 patch onto the skin daily 12 hours on, 12 hours off. Loratadine 10 MG CAPS  Take by mouth             meloxicam (MOBIC) 15 MG tablet  Take 15 mg by mouth daily             metFORMIN (GLUCOPHAGE) 500 MG tablet  Take 1 tablet by mouth 2 times daily (with meals)             montelukast (SINGULAIR) 10 MG tablet  TAKE 1 TABLET BY MOUTH DAILY             morphine (MS CONTIN) 15 MG extended release tablet  Take 15 mg by mouth 2 times daily . ondansetron (ZOFRAN ODT) 4 MG disintegrating tablet  Take 1 tablet by mouth every 8 hours as needed for Nausea or Vomiting             predniSONE (DELTASONE) 5 MG tablet  Take 5 mg by mouth 2 times daily.              senna (SENOKOT) 8.6 MG tablet  Take 1 tablet by mouth 2 times daily             sucralfate (CARAFATE) 1 GM tablet  TAKE 1 TABLET 4 TIMES DAILY                   Medications

## 2018-12-05 NOTE — PROGRESS NOTES
[address] [facility]  Dept: [phone]  Dept Fax: [phone]  Loc: [phone]    Inez Rust is a 45 y.o. female who presents today for her medical conditions/complaints as noted below. nIez Rust is c/o of   Chief Complaint   Patient presents with    Follow-Up from [address] [place] [address] -11/28/18gastroentitis - feeling much better       HPI:     HPI      Past Medical History:   Diagnosis Date    Anemia     Arthritis     rheumatoid    Asthma     DJD (degenerative joint disease)     GERD (gastroesophageal reflux disease)     History of bleeding ulcers     Rheumatoid arthritis(714.0)           Social History   Substance Use Topics    Smoking status: Current Every Day Smoker     Packs/day: 0.50     Years: 25.00     Types: Cigarettes     Last attempt to quit: 7/1/2016    Smokeless tobacco: Never Used      Comment: aditi rrt 11/26/18    Alcohol use No     Current Outpatient Prescriptions   Medication Sig Dispense Refill    metFORMIN (GLUCOPHAGE) 500 MG tablet Take 1 tablet by mouth 2 times daily (with meals) 60 tablet 0    hydrOXYzine (VISTARIL) 25 MG capsule Take 1 capsule by mouth 3 times daily as needed for Anxiety 30 capsule 1    ondansetron (ZOFRAN ODT) 4 MG disintegrating tablet Take 1 tablet by mouth every 8 hours as needed for Nausea or Vomiting 30 tablet 2    eszopiclone (LUNESTA) 3 MG TABS Take 1 tablet by mouth nightly for 360 days. . 90 tablet 3    esomeprazole (NEXIUM) 40 MG delayed release capsule TAKE 1 CAPSULE TWICE DAILY 60 capsule 5    montelukast (SINGULAIR) 10 MG tablet TAKE 1 TABLET BY MOUTH DAILY 90 tablet 3    clindamycin (CLINDAGEL) 1 % gel clindamycin 1 % topical gel      furosemide (LASIX) 20 MG tablet Take 1 tablet by mouth daily 90 tablet 3    sucralfate (CARAFATE) 1 GM tablet TAKE 1 TABLET 4 TIMES DAILY 360 tablet 3    benzonatate (TESSALON PERLES) 100 MG capsule Take 1 capsule by mouth 3 times daily 90 capsule 5    morphine (MS CONTIN) 15 MG extended release tablet Take 15 mg by mouth 2 times daily .  Loratadine 10 MG CAPS Take by mouth      gabapentin (NEURONTIN) 400 MG capsule Take 400 mg by mouth 1 cap q AM, 2 caps q HS      HYDROcodone-acetaminophen (NORCO)  MG per tablet Take 1 tablet by mouth 4 times daily. Shelmaria antonia Saul meloxicam (MOBIC) 15 MG tablet Take 15 mg by mouth daily      lidocaine (LIDODERM) 5 % Place 1 patch onto the skin daily 12 hours on, 12 hours off. 30 patch 0    diclofenac sodium (VOLTAREN) 1 % GEL Apply 4 g topically 4 times daily as needed 5 Tube 11    Cholecalciferol (VITAMIN D) 2000 UNITS CAPS capsule Take 2,000 Units by mouth 2 times daily      predniSONE (DELTASONE) 5 MG tablet Take 5 mg by mouth 2 times daily.  hydroxychloroquine (PLAQUENIL) 200 MG tablet   Take by mouth One every morning and 2 every night      senna (SENOKOT) 8.6 MG tablet Take 1 tablet by mouth 2 times daily 60 tablet 0    fluticasone (FLOVENT HFA) 110 MCG/ACT inhaler Inhale 2 puffs into the lungs 2 times daily (Patient taking differently: Inhale 2 puffs into the lungs 2 times daily ) 1 Inhaler 3    albuterol (PROAIR HFA) 108 (90 BASE) MCG/ACT inhaler Inhale 2 puffs into the lungs every 6 hours as needed for Wheezing 1 Inhaler 5     No current facility-administered medications for this visit.         Allergies   Allergen Reactions    Avelox [Moxifloxacin]      Rash and edema    Bactrim [Sulfamethoxazole-Trimethoprim]      hives    Cefuroxime Axetil Nausea Only    Codeine      Nausea and rash    Leane Solid [Tofacitinib] Diarrhea     vomit       Subjective:     Review of Systems    Objective:     Physical Exam  /84 (Site: Left Upper Arm, Position: Sitting, Cuff Size: Medium Adult)   Pulse 104   Temp 98 °F (36.7 °C) (Tympanic)   Ht 5' 5\" (1.651 m)   Wt 198 lb (89.8 kg)   LMP 04/12/2012   SpO2 96%   BMI 32.95 kg/m²     Assessment:                Plan:      No Follow-up on

## 2018-12-06 ENCOUNTER — OFFICE VISIT (OUTPATIENT)
Dept: INTERNAL MEDICINE | Age: 53
End: 2018-12-06
Payer: COMMERCIAL

## 2018-12-06 VITALS
RESPIRATION RATE: 16 BRPM | BODY MASS INDEX: 32.62 KG/M2 | DIASTOLIC BLOOD PRESSURE: 78 MMHG | OXYGEN SATURATION: 97 % | SYSTOLIC BLOOD PRESSURE: 136 MMHG | HEART RATE: 74 BPM | WEIGHT: 196 LBS

## 2018-12-06 DIAGNOSIS — E11.9 TYPE 2 DIABETES MELLITUS WITHOUT COMPLICATION, WITHOUT LONG-TERM CURRENT USE OF INSULIN (HCC): Primary | ICD-10-CM

## 2018-12-06 DIAGNOSIS — E66.09 CLASS 1 OBESITY DUE TO EXCESS CALORIES WITH SERIOUS COMORBIDITY AND BODY MASS INDEX (BMI) OF 32.0 TO 32.9 IN ADULT: ICD-10-CM

## 2018-12-06 DIAGNOSIS — Z71.6 TOBACCO ABUSE COUNSELING: ICD-10-CM

## 2018-12-06 PROBLEM — E66.811 CLASS 1 OBESITY DUE TO EXCESS CALORIES WITH SERIOUS COMORBIDITY AND BODY MASS INDEX (BMI) OF 32.0 TO 32.9 IN ADULT: Status: ACTIVE | Noted: 2018-05-22

## 2018-12-06 PROBLEM — M25.569 KNEE PAIN: Status: ACTIVE | Noted: 2018-12-06

## 2018-12-06 PROBLEM — M46.1 INFLAMMATION OF SACROILIAC JOINT (HCC): Status: ACTIVE | Noted: 2018-12-06

## 2018-12-06 PROBLEM — M70.60 TROCHANTERIC BURSITIS: Status: ACTIVE | Noted: 2018-12-06

## 2018-12-06 PROBLEM — M25.559 HIP PAIN: Status: ACTIVE | Noted: 2018-12-06

## 2018-12-06 PROCEDURE — 3046F HEMOGLOBIN A1C LEVEL >9.0%: CPT | Performed by: NURSE PRACTITIONER

## 2018-12-06 PROCEDURE — 4004F PT TOBACCO SCREEN RCVD TLK: CPT | Performed by: NURSE PRACTITIONER

## 2018-12-06 PROCEDURE — G8417 CALC BMI ABV UP PARAM F/U: HCPCS | Performed by: NURSE PRACTITIONER

## 2018-12-06 PROCEDURE — 1111F DSCHRG MED/CURRENT MED MERGE: CPT | Performed by: NURSE PRACTITIONER

## 2018-12-06 PROCEDURE — G8484 FLU IMMUNIZE NO ADMIN: HCPCS | Performed by: NURSE PRACTITIONER

## 2018-12-06 PROCEDURE — 3017F COLORECTAL CA SCREEN DOC REV: CPT | Performed by: NURSE PRACTITIONER

## 2018-12-06 PROCEDURE — 99205 OFFICE O/P NEW HI 60 MIN: CPT | Performed by: NURSE PRACTITIONER

## 2018-12-06 PROCEDURE — G8427 DOCREV CUR MEDS BY ELIG CLIN: HCPCS | Performed by: NURSE PRACTITIONER

## 2018-12-06 PROCEDURE — 2022F DILAT RTA XM EVC RTNOPTHY: CPT | Performed by: NURSE PRACTITIONER

## 2018-12-06 RX ORDER — HYDROXYZINE PAMOATE 25 MG/1
25 CAPSULE ORAL 3 TIMES DAILY PRN
Qty: 30 CAPSULE | Refills: 1 | Status: CANCELLED | OUTPATIENT
Start: 2018-12-06 | End: 2018-12-20

## 2018-12-06 ASSESSMENT — ENCOUNTER SYMPTOMS
VISUAL CHANGE: 0
RESPIRATORY NEGATIVE: 1
SHORTNESS OF BREATH: 0
ABDOMINAL PAIN: 0
BLURRED VISION: 0
DIARRHEA: 0

## 2018-12-06 NOTE — PATIENT INSTRUCTIONS
Low carb diet 45 grams at each meal with two 15 gram snacks. Keep a food log and bring with you to the next appointment. Exercise: continue water therapy    Medication: Metformin 500mg BID and will most likely increase to 1000 mg BID. Next visit we will talk about a glucometer.    Nature bounty cinnamon

## 2018-12-07 ENCOUNTER — TELEPHONE (OUTPATIENT)
Dept: FAMILY MEDICINE CLINIC | Age: 53
End: 2018-12-07

## 2018-12-18 ENCOUNTER — HOSPITAL ENCOUNTER (OUTPATIENT)
Dept: MAMMOGRAPHY | Age: 53
Discharge: HOME | End: 2018-12-20
Payer: COMMERCIAL

## 2018-12-18 DIAGNOSIS — Z12.39 SCREENING FOR BREAST CANCER: ICD-10-CM

## 2018-12-18 PROCEDURE — 77063 BREAST TOMOSYNTHESIS BI: CPT

## 2018-12-28 RX ORDER — BENZONATATE 100 MG/1
CAPSULE ORAL
Qty: 30 CAPSULE | Refills: 0 | Status: SHIPPED | OUTPATIENT
Start: 2018-12-28 | End: 2019-06-13 | Stop reason: SDUPTHER

## 2018-12-31 DIAGNOSIS — E11.9 TYPE 2 DIABETES MELLITUS WITHOUT COMPLICATION, WITHOUT LONG-TERM CURRENT USE OF INSULIN (HCC): Primary | ICD-10-CM

## 2019-01-03 ENCOUNTER — OFFICE VISIT (OUTPATIENT)
Dept: PAIN MANAGEMENT | Age: 54
End: 2019-01-03
Payer: COMMERCIAL

## 2019-01-03 VITALS
BODY MASS INDEX: 30.16 KG/M2 | OXYGEN SATURATION: 97 % | SYSTOLIC BLOOD PRESSURE: 120 MMHG | DIASTOLIC BLOOD PRESSURE: 78 MMHG | HEART RATE: 110 BPM | HEIGHT: 65 IN | RESPIRATION RATE: 16 BRPM | WEIGHT: 181 LBS

## 2019-01-03 DIAGNOSIS — M48.061 SPINAL STENOSIS OF LUMBAR REGION WITHOUT NEUROGENIC CLAUDICATION: ICD-10-CM

## 2019-01-03 DIAGNOSIS — G89.29 ENCOUNTER FOR CHRONIC PAIN MANAGEMENT: ICD-10-CM

## 2019-01-03 DIAGNOSIS — Z98.890 HISTORY OF LUMBAR SURGERY: ICD-10-CM

## 2019-01-03 DIAGNOSIS — M96.1 POSTLAMINECTOMY SYNDROME: Primary | ICD-10-CM

## 2019-01-03 DIAGNOSIS — M54.16 LUMBAR RADICULOPATHY: ICD-10-CM

## 2019-01-03 PROCEDURE — G8484 FLU IMMUNIZE NO ADMIN: HCPCS | Performed by: NURSE PRACTITIONER

## 2019-01-03 PROCEDURE — 4004F PT TOBACCO SCREEN RCVD TLK: CPT | Performed by: NURSE PRACTITIONER

## 2019-01-03 PROCEDURE — G8427 DOCREV CUR MEDS BY ELIG CLIN: HCPCS | Performed by: NURSE PRACTITIONER

## 2019-01-03 PROCEDURE — 3017F COLORECTAL CA SCREEN DOC REV: CPT | Performed by: NURSE PRACTITIONER

## 2019-01-03 PROCEDURE — 99214 OFFICE O/P EST MOD 30 MIN: CPT | Performed by: NURSE PRACTITIONER

## 2019-01-03 PROCEDURE — G8417 CALC BMI ABV UP PARAM F/U: HCPCS | Performed by: NURSE PRACTITIONER

## 2019-01-03 RX ORDER — GABAPENTIN 400 MG/1
400 CAPSULE ORAL 4 TIMES DAILY
Qty: 120 CAPSULE | Refills: 5 | Status: SHIPPED | OUTPATIENT
Start: 2019-01-03 | End: 2019-09-05 | Stop reason: DRUGHIGH

## 2019-01-03 RX ORDER — LIDOCAINE 50 MG/G
1 PATCH TOPICAL DAILY
Qty: 30 PATCH | Refills: 5 | Status: SHIPPED | OUTPATIENT
Start: 2019-01-03 | End: 2019-04-18 | Stop reason: DRUGHIGH

## 2019-01-03 RX ORDER — HYDROCODONE BITARTRATE AND ACETAMINOPHEN 10; 325 MG/1; MG/1
1 TABLET ORAL 4 TIMES DAILY
Qty: 120 TABLET | Refills: 0 | OUTPATIENT
Start: 2019-01-09 | End: 2019-02-08

## 2019-01-03 RX ORDER — HYDROCODONE BITARTRATE AND ACETAMINOPHEN 10; 325 MG/1; MG/1
1 TABLET ORAL EVERY 6 HOURS PRN
Qty: 120 TABLET | Refills: 0 | Status: SHIPPED | OUTPATIENT
Start: 2019-01-10 | End: 2019-02-09

## 2019-01-03 ASSESSMENT — ENCOUNTER SYMPTOMS
BACK PAIN: 1
ABDOMINAL PAIN: 0
RESPIRATORY NEGATIVE: 1

## 2019-01-03 ASSESSMENT — PATIENT HEALTH QUESTIONNAIRE - PHQ9
1. LITTLE INTEREST OR PLEASURE IN DOING THINGS: 0
2. FEELING DOWN, DEPRESSED OR HOPELESS: 0
SUM OF ALL RESPONSES TO PHQ9 QUESTIONS 1 & 2: 0
SUM OF ALL RESPONSES TO PHQ QUESTIONS 1-9: 0
SUM OF ALL RESPONSES TO PHQ QUESTIONS 1-9: 0

## 2019-01-24 DIAGNOSIS — E11.9 TYPE 2 DIABETES MELLITUS WITHOUT COMPLICATION, WITHOUT LONG-TERM CURRENT USE OF INSULIN (HCC): ICD-10-CM

## 2019-01-25 DIAGNOSIS — K21.9 GASTROESOPHAGEAL REFLUX DISEASE WITHOUT ESOPHAGITIS: Primary | ICD-10-CM

## 2019-01-25 DIAGNOSIS — K52.9 GASTROENTERITIS: ICD-10-CM

## 2019-01-25 RX ORDER — SUCRALFATE 1 G/1
TABLET ORAL
Qty: 240 TABLET | Refills: 0 | Status: SHIPPED | OUTPATIENT
Start: 2019-01-25 | End: 2019-03-12 | Stop reason: SDUPTHER

## 2019-01-28 DIAGNOSIS — E11.9 TYPE 2 DIABETES MELLITUS WITHOUT COMPLICATION, WITHOUT LONG-TERM CURRENT USE OF INSULIN (HCC): ICD-10-CM

## 2019-02-11 DIAGNOSIS — M96.1 POSTLAMINECTOMY SYNDROME: Primary | ICD-10-CM

## 2019-02-11 RX ORDER — HYDROCODONE BITARTRATE AND ACETAMINOPHEN 10; 325 MG/1; MG/1
1 TABLET ORAL 4 TIMES DAILY
Qty: 120 TABLET | Refills: 0 | Status: SHIPPED | OUTPATIENT
Start: 2019-02-11 | End: 2019-03-15 | Stop reason: SDUPTHER

## 2019-03-11 DIAGNOSIS — K52.9 GASTROENTERITIS: ICD-10-CM

## 2019-03-11 DIAGNOSIS — K21.9 GASTROESOPHAGEAL REFLUX DISEASE WITHOUT ESOPHAGITIS: ICD-10-CM

## 2019-03-12 RX ORDER — SUCRALFATE 1 G/1
TABLET ORAL
Qty: 120 TABLET | Refills: 0 | Status: SHIPPED | OUTPATIENT
Start: 2019-03-12 | End: 2019-05-28

## 2019-03-15 DIAGNOSIS — M96.1 POSTLAMINECTOMY SYNDROME: ICD-10-CM

## 2019-03-15 RX ORDER — HYDROCODONE BITARTRATE AND ACETAMINOPHEN 10; 325 MG/1; MG/1
1 TABLET ORAL 4 TIMES DAILY
Qty: 120 TABLET | Refills: 0 | Status: SHIPPED | OUTPATIENT
Start: 2019-03-15 | End: 2019-04-14

## 2019-03-22 ENCOUNTER — TELEPHONE (OUTPATIENT)
Dept: INTERNAL MEDICINE | Age: 54
End: 2019-03-22

## 2019-03-22 RX ORDER — OSELTAMIVIR PHOSPHATE 75 MG/1
75 CAPSULE ORAL DAILY
Qty: 10 CAPSULE | Refills: 0 | OUTPATIENT
Start: 2019-03-22 | End: 2019-04-01

## 2019-04-16 DIAGNOSIS — G47.00 INSOMNIA, UNSPECIFIED TYPE: ICD-10-CM

## 2019-04-16 RX ORDER — ESZOPICLONE 3 MG/1
TABLET, FILM COATED ORAL
Qty: 90 TABLET | Refills: 3 | OUTPATIENT
Start: 2019-04-16

## 2019-04-16 RX ORDER — ESZOPICLONE 3 MG/1
3 TABLET, FILM COATED ORAL NIGHTLY
Qty: 90 TABLET | Refills: 0 | Status: SHIPPED | OUTPATIENT
Start: 2019-04-16 | End: 2019-07-10 | Stop reason: SDUPTHER

## 2019-04-16 NOTE — TELEPHONE ENCOUNTER
Spoke to [facility] and they can't do a year supply of controled so the October one .       Last Appt:  2018  Next Appt:   2019  Med verified in Epic 19

## 2019-04-18 ENCOUNTER — OFFICE VISIT (OUTPATIENT)
Dept: PAIN MANAGEMENT | Age: 54
End: 2019-04-18
Payer: COMMERCIAL

## 2019-04-18 ENCOUNTER — HOSPITAL ENCOUNTER (OUTPATIENT)
Age: 54
Setting detail: SPECIMEN
Discharge: HOME | End: 2019-04-18
Payer: COMMERCIAL

## 2019-04-18 VITALS
BODY MASS INDEX: 30.56 KG/M2 | WEIGHT: 183.4 LBS | SYSTOLIC BLOOD PRESSURE: 115 MMHG | RESPIRATION RATE: 14 BRPM | DIASTOLIC BLOOD PRESSURE: 70 MMHG | HEIGHT: 65 IN

## 2019-04-18 DIAGNOSIS — M96.1 POSTLAMINECTOMY SYNDROME: ICD-10-CM

## 2019-04-18 DIAGNOSIS — G89.4 CHRONIC PAIN SYNDROME: ICD-10-CM

## 2019-04-18 DIAGNOSIS — Z79.891 ENCOUNTER FOR LONG-TERM OPIATE ANALGESIC USE: ICD-10-CM

## 2019-04-18 DIAGNOSIS — Z02.83 ENCOUNTER FOR DRUG SCREENING: ICD-10-CM

## 2019-04-18 DIAGNOSIS — Z79.891 ENCOUNTER FOR LONG-TERM OPIATE ANALGESIC USE: Primary | ICD-10-CM

## 2019-04-18 DIAGNOSIS — M06.9 RHEUMATOID ARTHRITIS, INVOLVING UNSPECIFIED SITE, UNSPECIFIED RHEUMATOID FACTOR PRESENCE: ICD-10-CM

## 2019-04-18 PROCEDURE — 3017F COLORECTAL CA SCREEN DOC REV: CPT | Performed by: NURSE PRACTITIONER

## 2019-04-18 PROCEDURE — 99214 OFFICE O/P EST MOD 30 MIN: CPT | Performed by: NURSE PRACTITIONER

## 2019-04-18 PROCEDURE — G8417 CALC BMI ABV UP PARAM F/U: HCPCS | Performed by: NURSE PRACTITIONER

## 2019-04-18 PROCEDURE — 80307 DRUG TEST PRSMV CHEM ANLYZR: CPT

## 2019-04-18 PROCEDURE — 4004F PT TOBACCO SCREEN RCVD TLK: CPT | Performed by: NURSE PRACTITIONER

## 2019-04-18 PROCEDURE — G8427 DOCREV CUR MEDS BY ELIG CLIN: HCPCS | Performed by: NURSE PRACTITIONER

## 2019-04-18 RX ORDER — LIDOCAINE 50 MG/G
2 PATCH TOPICAL
COMMUNITY
End: 2019-04-18 | Stop reason: SDUPTHER

## 2019-04-18 RX ORDER — MORPHINE SULFATE 15 MG/1
15 TABLET, FILM COATED, EXTENDED RELEASE ORAL EVERY 12 HOURS
Qty: 60 TABLET | Refills: 0 | Status: SHIPPED | OUTPATIENT
Start: 2019-04-18 | End: 2019-09-05

## 2019-04-18 RX ORDER — LIDOCAINE 50 MG/G
2 PATCH TOPICAL DAILY
Qty: 60 PATCH | Refills: 5 | Status: SHIPPED | OUTPATIENT
Start: 2019-04-18 | End: 2019-07-10 | Stop reason: SDUPTHER

## 2019-04-18 RX ORDER — HYDROCODONE BITARTRATE AND ACETAMINOPHEN 10; 325 MG/1; MG/1
1 TABLET ORAL EVERY 6 HOURS PRN
Qty: 120 TABLET | Refills: 0 | Status: SHIPPED | OUTPATIENT
Start: 2019-04-18 | End: 2019-05-23 | Stop reason: SDUPTHER

## 2019-04-18 ASSESSMENT — ENCOUNTER SYMPTOMS
ABDOMINAL PAIN: 0
RESPIRATORY NEGATIVE: 1
BACK PAIN: 1

## 2019-04-18 NOTE — PROGRESS NOTES
Subjective:      Patient ID: Timi Snyder is a 52 y.o. female. Chief Complaint   Patient presents with    Lower Back Pain     Back Pain   Associated symptoms include weakness. Pertinent negatives include no abdominal pain. Returns to office to reestablish care, followed Dr. Delroy Lara to [facility] and now returning for medication management. She is currently prescribed Norco for btp, MSContin 15mg bid. She does not take morphine bid because it makes her very tired. May need upcoming lumbar surgery, Dr. Jacqueline Bustos is her neurosurgeon    Pain Assessment  Location of Pain: Back  Severity of Pain: 7  Quality of Pain: Sharp(sometimes it shoots, sometimes it has fire)  Duration of Pain: Persistent  Frequency of Pain: Constant  Aggravating Factors: Bending, Stretching, Straightening, Exercise, Kneeling, Squatting, Standing, Walking, Stairs(weather)  Relieving Factors: (laying flat with pillow under legs)    Allergies   Allergen Reactions    Avelox [Moxifloxacin]      Rash and edema    Bactrim [Sulfamethoxazole-Trimethoprim]      hives    Cefuroxime Axetil Nausea Only    Codeine      Nausea and rash    Filemon Carlo [Tofacitinib] Diarrhea     vomit       Outpatient Medications Marked as Taking for the 4/18/19 encounter (Office Visit) with JOSE Lucero CNP   Medication Sig Dispense Refill    lidocaine (LIDODERM) 5 % Place 2 patches onto the skin daily 60 patch 5    morphine (MS CONTIN) 15 MG extended release tablet Take 1 tablet by mouth every 12 hours for 30 days. 8am and 8pm 60 tablet 0    HYDROcodone-acetaminophen (NORCO)  MG per tablet Take 1 tablet by mouth every 6 hours as needed for Pain for up to 30 days. Intended supply: 30 days 120 tablet 0    eszopiclone (LUNESTA) 3 MG TABS Take 1 tablet by mouth nightly for 90 days.  90 tablet 0    sucralfate (CARAFATE) 1 GM tablet TAKE 1 TABLET 4 TIMES DAILY 120 tablet 0    metFORMIN (GLUCOPHAGE) 1000 MG tablet 1/2 tab BID for 2 weeks then 1 tab BID 45 13    SIJ    OTHER SURGICAL HISTORY  12    caudal epidural    OTHER SURGICAL HISTORY Right 13, 13     L4 TFE    OTHER SURGICAL HISTORY Right 14    HIP INJECTION    SPINE SURGERY  2010    Fusion and cage        Family History   Problem Relation Age of Onset    Heart Disease Paternal Grandmother     High Blood Pressure Paternal Grandmother     Heart Disease Paternal Grandfather     High Blood Pressure Paternal Grandfather     Heart Disease Father     High Blood Pressure Father     Stroke Maternal Grandmother     Diabetes Maternal Grandmother     Stroke Maternal Grandfather     Diabetes Maternal Grandfather        Social History     Socioeconomic History    Marital status:      Spouse name: None    Number of children: None    Years of education: None    Highest education level: None   Occupational History    None   Social Needs    Financial resource strain: None    Food insecurity:     Worry: None     Inability: None    Transportation needs:     Medical: None     Non-medical: None   Tobacco Use    Smoking status: Current Every Day Smoker     Packs/day: 1.00     Years: 25.00     Pack years: 25.00     Types: Cigarettes     Last attempt to quit: 2016     Years since quittin.8    Smokeless tobacco: Never Used    Tobacco comment: aditi rrt 18   Substance and Sexual Activity    Alcohol use: No     Alcohol/week: 0.0 oz    Drug use: No    Sexual activity: None   Lifestyle    Physical activity:     Days per week: None     Minutes per session: None    Stress: None   Relationships    Social connections:     Talks on phone: None     Gets together: None     Attends [employer] service: None     Active member of club or organization: None     Attends meetings of clubs or organizations: None     Relationship status: None    Intimate partner violence:     Fear of current or ex partner: None     Emotionally abused: None     Physically abused: None     Forced sexual activity: None   Other Topics Concern    None   Social History Narrative    None     Review of Systems   Constitutional: Positive for fatigue. Respiratory: Negative. Cardiovascular: Negative. Gastrointestinal: Negative for abdominal pain. Genitourinary: Negative. Musculoskeletal: Positive for arthralgias, back pain and myalgias. Neurological: Positive for weakness. Psychiatric/Behavioral: Positive for sleep disturbance. Objective:   Physical Exam   Constitutional: She is oriented to person, place, and time. She appears well-developed and well-nourished. She is cooperative. No distress. HENT:   Head: Normocephalic and atraumatic. Cardiovascular: Normal rate. Pulmonary/Chest: Effort normal. No respiratory distress. Musculoskeletal:        Lumbar back: She exhibits pain. Ambulates with rollator   Neurological: She is alert and oriented to person, place, and time. No cranial nerve deficit. GCS eye subscore is 4. GCS verbal subscore is 5. GCS motor subscore is 6. Skin: Skin is warm, dry and intact. Capillary refill takes less than 2 seconds. She is not diaphoretic. No cyanosis. No pallor. Nails show no clubbing. Psychiatric: She has a normal mood and affect. Her speech is normal and behavior is normal. Judgment normal. Her affect is not angry. She is not agitated, not aggressive, not withdrawn and not combative. She does not express impulsivity or inappropriate judgment. Vitals reviewed. Assessment:      1. Encounter for long-term opiate analgesic use    2. Encounter for drug screening    3. Chronic pain syndrome    4. Rheumatoid arthritis, involving unspecified site, unspecified rheumatoid factor presence ([facility] [address]    5. Postlaminectomy syndrome          Plan:       Chronic pain diagnoses such as   1. Encounter for long-term opiate analgesic use    2. Encounter for drug screening    3. Chronic pain syndrome    4.  Rheumatoid arthritis, involving unspecified site, unspecified rheumatoid factor presence ([facility] [address]    5. Postlaminectomy syndrome     controlled on current medication regime, wll continue current pain medications to improve quality of life and function.    MSContin 15mg at 8a and 8pm, if still awaking in middle of night, will increase 8pm dose to 30mg  Follow up 3 months    JOSE Mauricio - CNP

## 2019-04-22 LAB
6-ACETYLMORPHINE, UR: NOT DETECTED
7-AMINOCLONAZEPAM, URINE: NOT DETECTED
ALPHA-OH-ALPRAZ, URINE: NOT DETECTED
ALPRAZOLAM, URINE: NOT DETECTED
AMPHETAMINES, URINE: NOT DETECTED
BARBITURATES, URINE: NOT DETECTED
BENZOYLECGONINE, UR: NOT DETECTED
BUPRENORPHINE URINE: NOT DETECTED
CARISOPRODOL, UR: NOT DETECTED
CLONAZEPAM, URINE: NOT DETECTED
CODEINE, URINE: NOT DETECTED
CREATININE URINE: 29 MG/DL (ref 20–400)
DIAZEPAM, URINE: NOT DETECTED
EER PAIN MGT DRUG PANEL, HIGH RES/EMIT U: NORMAL
ETHYL GLUCURONIDE UR: NOT DETECTED
FENTANYL URINE: NOT DETECTED
HYDROCODONE, URINE: PRESENT
HYDROMORPHONE, URINE: PRESENT
LORAZEPAM, URINE: NOT DETECTED
MARIJUANA METAB, UR: NOT DETECTED
MDA, UR: NOT DETECTED
MDEA, EVE, UR: NOT DETECTED
MDMA URINE: NOT DETECTED
MEPERIDINE METAB, UR: NOT DETECTED
METHADONE, URINE: NOT DETECTED
METHAMPHETAMINE, URINE: NOT DETECTED
METHYLPHENIDATE: NOT DETECTED
MIDAZOLAM, URINE: NOT DETECTED
MORPHINE URINE: PRESENT
NORBUPRENORPHINE, URINE: NOT DETECTED
NORDIAZEPAM, URINE: NOT DETECTED
NORFENTANYL, URINE: NOT DETECTED
NORHYDROCODONE, URINE: PRESENT
NOROXYCODONE, URINE: NOT DETECTED
NOROXYMORPHONE, URINE: NOT DETECTED
OXAZEPAM, URINE: NOT DETECTED
OXYCODONE URINE: NOT DETECTED
OXYMORPHONE, URINE: NOT DETECTED
PAIN MGT DRUG PANEL, HI RES, UR: NORMAL
PCP,URINE: NOT DETECTED
PHENTERMINE, UR: NOT DETECTED
PROPOXYPHENE, URINE: NOT DETECTED
TAPENTADOL, URINE: NOT DETECTED
TAPENTADOL-O-SULFATE, URINE: NOT DETECTED
TEMAZEPAM, URINE: NOT DETECTED
TRAMADOL, URINE: NOT DETECTED
ZOLPIDEM, URINE: NOT DETECTED

## 2019-05-23 DIAGNOSIS — G89.4 CHRONIC PAIN SYNDROME: ICD-10-CM

## 2019-05-23 RX ORDER — HYDROCODONE BITARTRATE AND ACETAMINOPHEN 10; 325 MG/1; MG/1
1 TABLET ORAL EVERY 6 HOURS PRN
Qty: 120 TABLET | Refills: 0 | Status: SHIPPED | OUTPATIENT
Start: 2019-05-23 | End: 2019-06-18 | Stop reason: SDUPTHER

## 2019-05-28 DIAGNOSIS — K21.9 GASTROESOPHAGEAL REFLUX DISEASE WITHOUT ESOPHAGITIS: Primary | ICD-10-CM

## 2019-05-28 RX ORDER — SUCRALFATE 1 G/1
TABLET ORAL
Qty: 360 TABLET | Refills: 3 | Status: SHIPPED | OUTPATIENT
Start: 2019-05-28 | End: 2020-02-11

## 2019-06-01 ENCOUNTER — OFFICE VISIT (OUTPATIENT)
Dept: PRIMARY CARE CLINIC | Age: 54
End: 2019-06-01
Payer: COMMERCIAL

## 2019-06-01 VITALS
OXYGEN SATURATION: 98 % | HEART RATE: 104 BPM | DIASTOLIC BLOOD PRESSURE: 72 MMHG | SYSTOLIC BLOOD PRESSURE: 124 MMHG | WEIGHT: 187 LBS | TEMPERATURE: 98.6 F | BODY MASS INDEX: 31.16 KG/M2 | HEIGHT: 65 IN

## 2019-06-01 DIAGNOSIS — J01.40 ACUTE NON-RECURRENT PANSINUSITIS: Primary | ICD-10-CM

## 2019-06-01 PROCEDURE — 3017F COLORECTAL CA SCREEN DOC REV: CPT | Performed by: FAMILY MEDICINE

## 2019-06-01 PROCEDURE — G8427 DOCREV CUR MEDS BY ELIG CLIN: HCPCS | Performed by: FAMILY MEDICINE

## 2019-06-01 PROCEDURE — 4004F PT TOBACCO SCREEN RCVD TLK: CPT | Performed by: FAMILY MEDICINE

## 2019-06-01 PROCEDURE — G8417 CALC BMI ABV UP PARAM F/U: HCPCS | Performed by: FAMILY MEDICINE

## 2019-06-01 PROCEDURE — 99214 OFFICE O/P EST MOD 30 MIN: CPT | Performed by: FAMILY MEDICINE

## 2019-06-01 RX ORDER — AMOXICILLIN 875 MG/1
875 TABLET, COATED ORAL 2 TIMES DAILY
Qty: 20 TABLET | Refills: 0 | Status: SHIPPED | OUTPATIENT
Start: 2019-06-01 | End: 2019-07-10

## 2019-06-01 ASSESSMENT — ENCOUNTER SYMPTOMS
RHINORRHEA: 1
SORE THROAT: 1
COUGH: 1

## 2019-06-01 NOTE — PROGRESS NOTES
2019     Kiki Barron (:  1965) is a 52 y.o. female, here for evaluation of the following medical concerns:    Cough   This is a new problem. The current episode started in the past 7 days (2 days ago). The problem has been gradually worsening. The problem occurs constantly. The cough is productive of purulent sputum. Associated symptoms include ear pain, myalgias, nasal congestion, postnasal drip, rhinorrhea and a sore throat (itchy). Pertinent negatives include no chest pain, chills, eye redness, fever, headaches, rash, shortness of breath or wheezing. She has tried nothing for the symptoms. There is no history of environmental allergies. Did review patient's med list, allergies, social history,pmhx and pshx today as noted in the record. Review of Systems   Constitutional: Negative for chills, fatigue and fever. HENT: Positive for ear pain, postnasal drip, rhinorrhea, sinus pressure, sinus pain and sore throat (itchy). Negative for congestion and trouble swallowing. Eyes: Negative for discharge and redness. Respiratory: Positive for cough. Negative for shortness of breath and wheezing. Cardiovascular: Negative for chest pain. Gastrointestinal: Negative for abdominal pain, constipation, diarrhea, nausea and vomiting. Musculoskeletal: Positive for myalgias. Negative for arthralgias and neck pain. Skin: Negative for rash and wound. Allergic/Immunologic: Negative for environmental allergies. Neurological: Negative for dizziness, weakness, light-headedness and headaches. Hematological: Negative for adenopathy. Psychiatric/Behavioral: Negative. Prior to Visit Medications    Medication Sig Taking? Authorizing Provider   sucralfate (CARAFATE) 1 GM tablet TAKE ONE TABLET BY MOUTH FOUR TIMES A DAY Yes Marilin Sumner MD   HYDROcodone-acetaminophen (NORCO)  MG per tablet Take 1 tablet by mouth every 6 hours as needed for Pain for up to 30 days.  Intended supply: 30 days Yes JOSE Stevenson CNP   diclofenac sodium 1 % GEL APPLY 2 GRAMS TO AFFECTED AREA(S) FOUR TIMES A DAY Yes JOSE Hilliard CNP   lidocaine (LIDODERM) 5 % Place 2 patches onto the skin daily Yes JOSE Stevenson CNP   eszopiclone (LUNESTA) 3 MG TABS Take 1 tablet by mouth nightly for 90 days. Yes Daylin Acevedo MD   metFORMIN (GLUCOPHAGE) 1000 MG tablet 1/2 tab BID for 2 weeks then 1 tab BID Yes SRAVANI Vilchis   benzonatate (TESSALON) 100 MG capsule TAKE 1 CAPSULE 3 TIMES A DAY Yes Daylin Acevedo MD   ondansetron (ZOFRAN ODT) 4 MG disintegrating tablet Take 1 tablet by mouth every 8 hours as needed for Nausea or Vomiting Yes Daylin Acevedo MD   esomeprazole (NEXIUM) 40 MG delayed release capsule TAKE 1 CAPSULE TWICE DAILY Yes Daylin Acevedo MD   montelukast (SINGULAIR) 10 MG tablet TAKE 1 TABLET BY MOUTH DAILY Yes Daylin Acevedo MD   clindamycin (CLINDAGEL) 1 % gel clindamycin 1 % topical gel Yes Historical Provider, MD   furosemide (LASIX) 20 MG tablet Take 1 tablet by mouth daily Yes Daylin Acevedo MD   Loratadine 10 MG CAPS Take by mouth Yes Historical Provider, MD   meloxicam (MOBIC) 15 MG tablet Take 15 mg by mouth daily Yes Historical Provider, MD   diclofenac sodium (VOLTAREN) 1 % GEL Apply 4 g topically 4 times daily as needed Yes JOSE Stevenson CNP   Cholecalciferol (VITAMIN D) 2000 UNITS CAPS capsule Take 2,000 Units by mouth 2 times daily Yes Historical Provider, MD   albuterol (PROAIR HFA) 108 (90 BASE) MCG/ACT inhaler Inhale 2 puffs into the lungs every 6 hours as needed for Wheezing Yes Ramona Chase MD   predniSONE (DELTASONE) 5 MG tablet Take 5 mg by mouth 2 times daily. Yes Historical Provider, MD   hydroxychloroquine (PLAQUENIL) 200 MG tablet   Take by mouth One every morning and 2 every night Yes Historical Provider, MD   gabapentin (NEURONTIN) 400 MG capsule Take 1 capsule by mouth 4 times daily for 31 days. JOSE Bansal CNP fluticasone (FLOVENT HFA) 110 MCG/ACT inhaler Inhale 2 puffs into the lungs 2 times daily  Patient taking differently: Inhale 2 puffs into the lungs 2 times daily   Fernando Garcia MD        Social History     Tobacco Use    Smoking status: Current Every Day Smoker     Packs/day: 1.00     Years: 25.00     Pack years: 25.00     Types: Cigarettes     Last attempt to quit: 2016     Years since quittin.9    Smokeless tobacco: Never Used    Tobacco comment: syant rrt 18   Substance Use Topics    Alcohol use: No     Alcohol/week: 0.0 oz        Vitals:    19 1335   BP: 124/72   Site: Left Upper Arm   Position: Sitting   Cuff Size: Medium Adult   Pulse: 104   Temp: 98.6 °F (37 °C)   TempSrc: Tympanic   SpO2: 98%   Weight: 187 lb (84.8 kg)   Height: 5' 5\" (1.651 m)     Estimated body mass index is 31.12 kg/m² as calculated from the following:    Height as of this encounter: 5' 5\" (1.651 m). Weight as of this encounter: 187 lb (84.8 kg). Physical Exam   Constitutional: She is oriented to person, place, and time. She appears well-developed and well-nourished. No distress. HENT:   Head: Normocephalic and atraumatic. Right Ear: External ear normal.   Left Ear: External ear normal.   Thick sinus drainage. Maxillary and frontal sinus tenderness with palpation bilaterally. TMs dull with fluid behind the TM. +PND     Eyes: Pupils are equal, round, and reactive to light. Conjunctivae and EOM are normal. Right eye exhibits no discharge. Left eye exhibits no discharge. No scleral icterus. Neck: Normal range of motion. Neck supple. No thyromegaly present. Cardiovascular: Normal rate, regular rhythm and normal heart sounds. Pulmonary/Chest: Effort normal and breath sounds normal. No respiratory distress. She has no wheezes. Abdominal: Soft. Bowel sounds are normal. She exhibits no distension. Musculoskeletal: She exhibits no edema. Lymphadenopathy:     She has cervical adenopathy.

## 2019-06-02 ASSESSMENT — ENCOUNTER SYMPTOMS
SINUS PRESSURE: 1
VOMITING: 0
TROUBLE SWALLOWING: 0
NAUSEA: 0
CONSTIPATION: 0
DIARRHEA: 0
EYE REDNESS: 0
WHEEZING: 0
SINUS PAIN: 1
ABDOMINAL PAIN: 0
EYE DISCHARGE: 0
SHORTNESS OF BREATH: 0

## 2019-06-03 DIAGNOSIS — K21.9 GASTROESOPHAGEAL REFLUX DISEASE, ESOPHAGITIS PRESENCE NOT SPECIFIED: ICD-10-CM

## 2019-06-03 RX ORDER — FUROSEMIDE 20 MG/1
20 TABLET ORAL DAILY
Qty: 90 TABLET | Refills: 3 | Status: SHIPPED | OUTPATIENT
Start: 2019-06-03 | End: 2019-11-08 | Stop reason: SDUPTHER

## 2019-06-03 RX ORDER — ESOMEPRAZOLE MAGNESIUM 40 MG/1
40 CAPSULE, DELAYED RELEASE ORAL
Qty: 60 CAPSULE | Refills: 5 | Status: SHIPPED | OUTPATIENT
Start: 2019-06-03 | End: 2019-12-03 | Stop reason: SDUPTHER

## 2019-06-11 ENCOUNTER — HOSPITAL ENCOUNTER (OUTPATIENT)
Dept: LAB | Age: 54
Discharge: HOME | End: 2019-06-11
Payer: COMMERCIAL

## 2019-06-11 DIAGNOSIS — Z13.220 SCREENING FOR HYPERLIPIDEMIA: ICD-10-CM

## 2019-06-11 DIAGNOSIS — E11.9 TYPE 2 DIABETES MELLITUS WITHOUT COMPLICATION, WITHOUT LONG-TERM CURRENT USE OF INSULIN (HCC): ICD-10-CM

## 2019-06-11 LAB
ANION GAP SERPL CALCULATED.3IONS-SCNC: 11 MMOL/L (ref 9–17)
BUN BLDV-MCNC: 18 MG/DL (ref 6–20)
BUN/CREAT BLD: 39 (ref 9–20)
CALCIUM SERPL-MCNC: 9.3 MG/DL (ref 8.6–10.4)
CHLORIDE BLD-SCNC: 104 MMOL/L (ref 98–107)
CHOLESTEROL/HDL RATIO: 2.4
CHOLESTEROL: 158 MG/DL
CO2: 31 MMOL/L (ref 20–31)
CREAT SERPL-MCNC: 0.46 MG/DL (ref 0.5–0.9)
ESTIMATED AVERAGE GLUCOSE: 163 MG/DL
GFR AFRICAN AMERICAN: >60 ML/MIN
GFR NON-AFRICAN AMERICAN: >60 ML/MIN
GFR SERPL CREATININE-BSD FRML MDRD: ABNORMAL ML/MIN/{1.73_M2}
GFR SERPL CREATININE-BSD FRML MDRD: ABNORMAL ML/MIN/{1.73_M2}
GLUCOSE BLD-MCNC: 128 MG/DL (ref 70–99)
HBA1C MFR BLD: 7.3 % (ref 4.8–5.9)
HDLC SERPL-MCNC: 67 MG/DL
LDL CHOLESTEROL: 71 MG/DL (ref 0–130)
POTASSIUM SERPL-SCNC: 4 MMOL/L (ref 3.7–5.3)
SODIUM BLD-SCNC: 146 MMOL/L (ref 135–144)
TRIGL SERPL-MCNC: 102 MG/DL
VLDLC SERPL CALC-MCNC: NORMAL MG/DL (ref 1–30)

## 2019-06-11 PROCEDURE — 83036 HEMOGLOBIN GLYCOSYLATED A1C: CPT

## 2019-06-11 PROCEDURE — 80048 BASIC METABOLIC PNL TOTAL CA: CPT

## 2019-06-11 PROCEDURE — 36415 COLL VENOUS BLD VENIPUNCTURE: CPT

## 2019-06-11 PROCEDURE — 80061 LIPID PANEL: CPT

## 2019-06-13 ENCOUNTER — OFFICE VISIT (OUTPATIENT)
Dept: FAMILY MEDICINE CLINIC | Age: 54
End: 2019-06-13
Payer: COMMERCIAL

## 2019-06-13 VITALS
BODY MASS INDEX: 31.25 KG/M2 | OXYGEN SATURATION: 97 % | SYSTOLIC BLOOD PRESSURE: 124 MMHG | HEIGHT: 65 IN | DIASTOLIC BLOOD PRESSURE: 82 MMHG | WEIGHT: 187.6 LBS | HEART RATE: 107 BPM

## 2019-06-13 DIAGNOSIS — J45.40 MODERATE PERSISTENT ASTHMA WITHOUT COMPLICATION: Primary | ICD-10-CM

## 2019-06-13 DIAGNOSIS — E11.9 TYPE 2 DIABETES MELLITUS WITHOUT COMPLICATION, WITHOUT LONG-TERM CURRENT USE OF INSULIN (HCC): ICD-10-CM

## 2019-06-13 DIAGNOSIS — F41.1 GAD (GENERALIZED ANXIETY DISORDER): ICD-10-CM

## 2019-06-13 DIAGNOSIS — Z87.891 PERSONAL HISTORY OF TOBACCO USE, PRESENTING HAZARDS TO HEALTH: ICD-10-CM

## 2019-06-13 PROCEDURE — 4004F PT TOBACCO SCREEN RCVD TLK: CPT | Performed by: FAMILY MEDICINE

## 2019-06-13 PROCEDURE — 99406 BEHAV CHNG SMOKING 3-10 MIN: CPT | Performed by: FAMILY MEDICINE

## 2019-06-13 PROCEDURE — 99214 OFFICE O/P EST MOD 30 MIN: CPT | Performed by: FAMILY MEDICINE

## 2019-06-13 PROCEDURE — 2022F DILAT RTA XM EVC RTNOPTHY: CPT | Performed by: FAMILY MEDICINE

## 2019-06-13 PROCEDURE — 3017F COLORECTAL CA SCREEN DOC REV: CPT | Performed by: FAMILY MEDICINE

## 2019-06-13 PROCEDURE — G8417 CALC BMI ABV UP PARAM F/U: HCPCS | Performed by: FAMILY MEDICINE

## 2019-06-13 PROCEDURE — 3045F PR MOST RECENT HEMOGLOBIN A1C LEVEL 7.0-9.0%: CPT | Performed by: FAMILY MEDICINE

## 2019-06-13 PROCEDURE — G8427 DOCREV CUR MEDS BY ELIG CLIN: HCPCS | Performed by: FAMILY MEDICINE

## 2019-06-13 RX ORDER — HYDROXYZINE PAMOATE 25 MG/1
25 CAPSULE ORAL 3 TIMES DAILY PRN
Qty: 30 CAPSULE | Refills: 1 | Status: SHIPPED | OUTPATIENT
Start: 2019-06-13 | End: 2019-06-27

## 2019-06-13 RX ORDER — METFORMIN HYDROCHLORIDE 500 MG/1
1000 TABLET, EXTENDED RELEASE ORAL
Qty: 60 TABLET | Refills: 5 | Status: SHIPPED | OUTPATIENT
Start: 2019-06-13 | End: 2020-02-11

## 2019-06-13 RX ORDER — BENZONATATE 100 MG/1
CAPSULE ORAL
Qty: 30 CAPSULE | Refills: 5 | Status: SHIPPED | OUTPATIENT
Start: 2019-06-13 | End: 2020-02-11

## 2019-06-13 RX ORDER — MONTELUKAST SODIUM 10 MG/1
TABLET ORAL
Qty: 90 TABLET | Refills: 3 | Status: SHIPPED | OUTPATIENT
Start: 2019-06-13 | End: 2020-06-23

## 2019-06-13 RX ORDER — BUPROPION HYDROCHLORIDE 75 MG/1
75 TABLET ORAL 2 TIMES DAILY
Qty: 60 TABLET | Refills: 3 | Status: SHIPPED | OUTPATIENT
Start: 2019-06-13 | End: 2020-02-11

## 2019-06-13 RX ORDER — CALCIUM CARBONATE 500(1250)
500 TABLET ORAL DAILY
COMMUNITY

## 2019-06-13 ASSESSMENT — ENCOUNTER SYMPTOMS
DIARRHEA: 0
NAUSEA: 0
SHORTNESS OF BREATH: 0
ABDOMINAL PAIN: 0
CHEST TIGHTNESS: 0
WHEEZING: 0
COUGH: 0
BACK PAIN: 1
CONSTIPATION: 0

## 2019-06-13 NOTE — PROGRESS NOTES
[facility]  Dept: [phone]  Dept Fax: [phone]  Loc: [phone]    Karlee Galvez is a 52 y.o. female who presents today for her medical conditions/complaints as noted below. Karlee Galvez is c/o of   Chief Complaint   Patient presents with    Diabetes     6m f/u     Discuss Labs     drawn 19    Discuss Medications     wants to know what  you are are replacing her Ativan with? and wants to talk about getting back on a blue inhaler, and wants to go on a extended release metformin       HPI:     HPI Here today for a follow up of her DM, back pain, asthma and stress. DM: improving; she has cut out all carbs. She is eating a lot of sugar free popsicles. She doesn't have cravings for chocolates anymore. She is not checking her blood sugars at all. She has not had any feelings of hypoglycemia. She has lost a lot of weight as well. She is also taking a cinnamon pill as well. She has only been taking her metformin once a day and the diarrhea and nausea is not bad. Back pain: worsening; she has been struggling to get out of bed at night so her  has to help her get up. Stress: worsening; she has been stressed because her pain has been worse and she has been stressed about her children. She has not had anything to take as needed for her anxiety because she never got the vistaril I had prescribed. Asthma: worsening; she does not think the flovent worked as well as the [place] that she has taken in the past. She has some chest tightness and generally she struggles with shortness of breath this time of year to to her kids. She is interested in quitting smoking though. She was able to do in the past with wellbutrin. She does not feel like she needs patches. She had a day that she had to go to a , she was sick and her little grand daughter accidentally got her morphine pill in her mouth.      Past Medical History:   Diagnosis Date    Anemia     Arthritis     rheumatoid    Asthma     DJD (degenerative joint disease)     GERD (gastroesophageal reflux disease)     History of bleeding ulcers     Rheumatoid arthritis(714.0)           Social History     Tobacco Use    Smoking status: Current Every Day Smoker     Packs/day: 1.00     Years: 25.00     Pack years: 25.00     Types: Cigarettes     Last attempt to quit: 2016     Years since quittin.9    Smokeless tobacco: Never Used    Tobacco comment: aditi rrt 18   Substance Use Topics    Alcohol use: No     Alcohol/week: 0.0 oz     Current Outpatient Medications   Medication Sig Dispense Refill    calcium carbonate (OSCAL) 500 MG TABS tablet Take 500 mg by mouth daily      montelukast (SINGULAIR) 10 MG tablet TAKE 1 TABLET BY MOUTH DAILY 90 tablet 3    benzonatate (TESSALON) 100 MG capsule TAKE 1 CAPSULE 3 TIMES A DAY 30 capsule 5    zoster recombinant adjuvanted vaccine (SHINGRIX) 50 MCG/0.5ML SUSR injection Inject 0.5 mLs into the muscle once for 1 dose 0.5 mL 1    hydrOXYzine (VISTARIL) 25 MG capsule Take 1 capsule by mouth 3 times daily as needed for Anxiety 30 capsule 1    metFORMIN (GLUCOPHAGE-XR) 500 MG extended release tablet Take 2 tablets by mouth daily (with breakfast) 60 tablet 5    mometasone-formoterol (DULERA) 200-5 MCG/ACT inhaler Inhale 1 puff into the lungs 2 times daily 1 Inhaler 3    buPROPion (WELLBUTRIN) 75 MG tablet Take 1 tablet by mouth 2 times daily 60 tablet 3    esomeprazole (NEXIUM) 40 MG delayed release capsule Take 1 capsule by mouth every morning (before breakfast) 60 capsule 5    furosemide (LASIX) 20 MG tablet Take 1 tablet by mouth daily 90 tablet 3    amoxicillin (AMOXIL) 875 MG tablet Take 1 tablet by mouth 2 times daily 20 tablet 0    sucralfate (CARAFATE) 1 GM tablet TAKE ONE TABLET BY MOUTH FOUR TIMES A  tablet 3    HYDROcodone-acetaminophen (NORCO)  MG per tablet Take 1 tablet by mouth every 6 hours as needed for Pain for up to 30 days. Intended supply: 30 days 120 tablet 0    diclofenac sodium 1 % GEL APPLY 2 GRAMS TO AFFECTED AREA(S) FOUR TIMES A  g 3    lidocaine (LIDODERM) 5 % Place 2 patches onto the skin daily 60 patch 5    eszopiclone (LUNESTA) 3 MG TABS Take 1 tablet by mouth nightly for 90 days. 90 tablet 0    gabapentin (NEURONTIN) 400 MG capsule Take 1 capsule by mouth 4 times daily for 31 days. . 120 capsule 5    ondansetron (ZOFRAN ODT) 4 MG disintegrating tablet Take 1 tablet by mouth every 8 hours as needed for Nausea or Vomiting 30 tablet 2    clindamycin (CLINDAGEL) 1 % gel clindamycin 1 % topical gel      Loratadine 10 MG CAPS Take by mouth      meloxicam (MOBIC) 15 MG tablet Take 15 mg by mouth daily      Cholecalciferol (VITAMIN D) 2000 UNITS CAPS capsule Take 2,000 Units by mouth 2 times daily      albuterol (PROAIR HFA) 108 (90 BASE) MCG/ACT inhaler Inhale 2 puffs into the lungs every 6 hours as needed for Wheezing 1 Inhaler 5    predniSONE (DELTASONE) 5 MG tablet Take 5 mg by mouth 2 times daily.  hydroxychloroquine (PLAQUENIL) 200 MG tablet   Take by mouth One every morning and 2 every night       No current facility-administered medications for this visit. Allergies   Allergen Reactions    Avelox [Moxifloxacin]      Rash and edema    Bactrim [Sulfamethoxazole-Trimethoprim]      hives    Cefuroxime Axetil Nausea Only    Codeine      Nausea and rash    Jordan Rhodes [Tofacitinib] Diarrhea     vomit       Subjective:     Review of Systems   Constitutional: Negative for activity change, appetite change, chills, fatigue and fever. Eyes: Negative for visual disturbance. Respiratory: Negative for cough, chest tightness, shortness of breath and wheezing. Cardiovascular: Negative for chest pain, palpitations and leg swelling. Gastrointestinal: Negative for abdominal pain, constipation, diarrhea and nausea.    Genitourinary: Negative for difficulty urinating. Musculoskeletal: Positive for back pain and gait problem. Neurological: Negative for dizziness, syncope, weakness and light-headedness. Objective:      Physical Exam   Constitutional: She is oriented to person, place, and time. She appears well-developed and well-nourished. No distress. Eyes: Conjunctivae are normal.   Neck: Normal range of motion. Neck supple. No thyromegaly present. Cardiovascular: Normal rate, regular rhythm, normal heart sounds and intact distal pulses. No murmur heard. Pulmonary/Chest: Effort normal and breath sounds normal. No respiratory distress. She has no wheezes. Musculoskeletal: She exhibits no edema. Lymphadenopathy:     She has no cervical adenopathy. Neurological: She is alert and oriented to person, place, and time. Skin: Skin is warm and dry. No rash noted. No erythema. Nursing note and vitals reviewed. /82 (Site: Right Upper Arm, Position: Sitting, Cuff Size: Medium Adult)   Pulse 107   Ht 5' 5\" (1.651 m)   Wt 187 lb 9.6 oz (85.1 kg)   LMP 04/12/2012   SpO2 97%   BMI 31.22 kg/m²      Wt Readings from Last 3 Encounters:   06/13/19 187 lb 9.6 oz (85.1 kg)   06/01/19 187 lb (84.8 kg)   04/18/19 183 lb 6.4 oz (83.2 kg)         Assessment:       Diagnosis Orders   1. Moderate persistent asthma without complication     2. Type 2 diabetes mellitus without complication, without long-term current use of insulin (HCC)  Basic Metabolic Panel    Hemoglobin A1C   3. SHARON (generalized anxiety disorder)        No visits with results within 1 Day(s) from this visit.    Latest known visit with results is:   Hospital Outpatient Visit on 06/11/2019   Component Date Value Ref Range Status    Hemoglobin A1C 06/11/2019 7.3* 4.8 - 5.9 % Final    Estimated Avg Glucose 06/11/2019 163  mg/dL Final    Cholesterol 06/11/2019 158  <200 mg/dL Final    HDL 06/11/2019 67  >40 mg/dL Final    LDL Cholesterol 06/11/2019 71  0 - 130 mg/dL Final    smoking cessation, DM follow up. Orders Placed This Encounter   Procedures    Basic Metabolic Panel     Standing Status:   Future     Standing Expiration Date:   6/13/2020    Hemoglobin A1C     Standing Status:   Future     Standing Expiration Date:   6/13/2020     Orders Placed This Encounter   Medications    montelukast (SINGULAIR) 10 MG tablet     Sig: TAKE 1 TABLET BY MOUTH DAILY     Dispense:  90 tablet     Refill:  3    benzonatate (TESSALON) 100 MG capsule     Sig: TAKE 1 CAPSULE 3 TIMES A DAY     Dispense:  30 capsule     Refill:  5    zoster recombinant adjuvanted vaccine (SHINGRIX) 50 MCG/0.5ML SUSR injection     Sig: Inject 0.5 mLs into the muscle once for 1 dose     Dispense:  0.5 mL     Refill:  1    hydrOXYzine (VISTARIL) 25 MG capsule     Sig: Take 1 capsule by mouth 3 times daily as needed for Anxiety     Dispense:  30 capsule     Refill:  1    metFORMIN (GLUCOPHAGE-XR) 500 MG extended release tablet     Sig: Take 2 tablets by mouth daily (with breakfast)     Dispense:  60 tablet     Refill:  5    mometasone-formoterol (DULERA) 200-5 MCG/ACT inhaler     Sig: Inhale 1 puff into the lungs 2 times daily     Dispense:  1 Inhaler     Refill:  3    buPROPion (WELLBUTRIN) 75 MG tablet     Sig: Take 1 tablet by mouth 2 times daily     Dispense:  60 tablet     Refill:  3       Patientgiven educational materials - see patient instructions. Discussed use, benefit,and side effects of prescribed medications. All patient questions answered. Ptvoiced understanding. Reviewed health maintenance. Instructed to continue currentmedications, diet and exercise. Patient agreed with treatment plan. Follow up asdirected.      Electronically signed by Suly Benson MD on 6/13/2019 at 11:01 PM

## 2019-06-14 ENCOUNTER — TELEPHONE (OUTPATIENT)
Dept: FAMILY MEDICINE CLINIC | Age: 54
End: 2019-06-14

## 2019-06-14 NOTE — TELEPHONE ENCOUNTER
We had sample of dulera 200/5. I gave her that and told her we will switch to one that her insurance will cover.

## 2019-06-14 NOTE — TELEPHONE ENCOUNTER
Pharmacy called and stated insurance won't pay for [place] but stated it will pay for symbicort, breo, adivar if you want to change.

## 2019-06-17 DIAGNOSIS — J44.9 COPD (CHRONIC OBSTRUCTIVE PULMONARY DISEASE) (HCC): ICD-10-CM

## 2019-06-17 RX ORDER — BUDESONIDE AND FORMOTEROL FUMARATE DIHYDRATE 160; 4.5 UG/1; UG/1
2 AEROSOL RESPIRATORY (INHALATION) 2 TIMES DAILY
Qty: 1 INHALER | Refills: 3 | Status: SHIPPED | OUTPATIENT
Start: 2019-06-17 | End: 2019-12-30 | Stop reason: SDUPTHER

## 2019-06-17 RX ORDER — CLINDAMYCIN PHOSPHATE 10 MG/G
GEL TOPICAL 2 TIMES DAILY
Qty: 60 G | Refills: 3 | Status: SHIPPED | OUTPATIENT
Start: 2019-06-17 | End: 2020-02-11

## 2019-06-17 RX ORDER — ALBUTEROL SULFATE 90 UG/1
2 AEROSOL, METERED RESPIRATORY (INHALATION) EVERY 6 HOURS PRN
Qty: 1 INHALER | Refills: 5 | Status: SHIPPED | OUTPATIENT
Start: 2019-06-17 | End: 2019-12-30 | Stop reason: SDUPTHER

## 2019-06-17 NOTE — TELEPHONE ENCOUNTER
Spoke to [facility] and she also needs her rescue inhaler- and clindamycin gel - I loaded them   I did tell her also about the symbicort was sent in to replace the dulera- since insurance didn't pay for the dulera

## 2019-06-18 DIAGNOSIS — G89.4 CHRONIC PAIN SYNDROME: ICD-10-CM

## 2019-06-18 RX ORDER — HYDROCODONE BITARTRATE AND ACETAMINOPHEN 10; 325 MG/1; MG/1
1 TABLET ORAL EVERY 6 HOURS PRN
Qty: 120 TABLET | Refills: 0 | Status: SHIPPED | OUTPATIENT
Start: 2019-06-22 | End: 2019-07-23 | Stop reason: SDUPTHER

## 2019-06-18 NOTE — TELEPHONE ENCOUNTER
The patient called the refill line requesting a refill line of norco.     [place]: 5/23/19 norco 10-325mg #120. Due 6/22/19.     Last Appt:  4/18/2019  Next Appt:   7/10/2019  Med verified in Epic

## 2019-06-24 ENCOUNTER — TELEPHONE (OUTPATIENT)
Dept: FAMILY MEDICINE CLINIC | Age: 54
End: 2019-06-24

## 2019-06-24 NOTE — TELEPHONE ENCOUNTER
No, the bacteria should be gone. What she can taste is the remaining drainage that just needs to get out of her system. From the illnesses we have been seeing it takes a few weeks to get everything cleared up.

## 2019-07-02 ENCOUNTER — TELEPHONE (OUTPATIENT)
Dept: FAMILY MEDICINE CLINIC | Age: 54
End: 2019-07-02

## 2019-07-02 DIAGNOSIS — R60.0 BILATERAL LEG EDEMA: Primary | ICD-10-CM

## 2019-07-05 ENCOUNTER — TELEPHONE (OUTPATIENT)
Dept: FAMILY MEDICINE CLINIC | Age: 54
End: 2019-07-05

## 2019-07-05 NOTE — TELEPHONE ENCOUNTER
hydroxyzine will only help with panic attacks not depression. I honestly can't remember why is she only on wellbutrin for depression? Did she have problems with prozac, zoloft, celexa or lexapro? Because if she is really depressed she needs one of those in addition to the wellbutrin and the vistari.

## 2019-07-09 RX ORDER — ESCITALOPRAM OXALATE 10 MG/1
10 TABLET ORAL DAILY
Qty: 30 TABLET | Refills: 1 | Status: SHIPPED | OUTPATIENT
Start: 2019-07-09 | End: 2019-07-10

## 2019-07-09 NOTE — TELEPHONE ENCOUNTER
Patient notified via vm, but asked her to give us a call so we could go over any questions she may have

## 2019-07-10 ENCOUNTER — OFFICE VISIT (OUTPATIENT)
Dept: PAIN MANAGEMENT | Age: 54
End: 2019-07-10
Payer: COMMERCIAL

## 2019-07-10 VITALS
HEIGHT: 63 IN | OXYGEN SATURATION: 93 % | SYSTOLIC BLOOD PRESSURE: 126 MMHG | RESPIRATION RATE: 18 BRPM | WEIGHT: 187.83 LBS | DIASTOLIC BLOOD PRESSURE: 76 MMHG | BODY MASS INDEX: 33.28 KG/M2 | HEART RATE: 107 BPM

## 2019-07-10 DIAGNOSIS — M79.604 LOW BACK PAIN RADIATING TO RIGHT LEG: ICD-10-CM

## 2019-07-10 DIAGNOSIS — M06.9 RHEUMATOID ARTHRITIS, INVOLVING UNSPECIFIED SITE, UNSPECIFIED RHEUMATOID FACTOR PRESENCE: ICD-10-CM

## 2019-07-10 DIAGNOSIS — M96.1 POSTLAMINECTOMY SYNDROME: ICD-10-CM

## 2019-07-10 DIAGNOSIS — M54.50 LOW BACK PAIN RADIATING TO RIGHT LEG: ICD-10-CM

## 2019-07-10 DIAGNOSIS — G89.4 CHRONIC PAIN SYNDROME: Primary | ICD-10-CM

## 2019-07-10 PROCEDURE — G8427 DOCREV CUR MEDS BY ELIG CLIN: HCPCS | Performed by: NURSE PRACTITIONER

## 2019-07-10 PROCEDURE — 99214 OFFICE O/P EST MOD 30 MIN: CPT | Performed by: NURSE PRACTITIONER

## 2019-07-10 PROCEDURE — 4004F PT TOBACCO SCREEN RCVD TLK: CPT | Performed by: NURSE PRACTITIONER

## 2019-07-10 PROCEDURE — 3017F COLORECTAL CA SCREEN DOC REV: CPT | Performed by: NURSE PRACTITIONER

## 2019-07-10 PROCEDURE — G8417 CALC BMI ABV UP PARAM F/U: HCPCS | Performed by: NURSE PRACTITIONER

## 2019-07-10 RX ORDER — HYDROCODONE BITARTRATE 20 MG/1
20 TABLET, EXTENDED RELEASE ORAL EVERY 24 HOURS
Qty: 30 TABLET | Refills: 0 | Status: SHIPPED | OUTPATIENT
Start: 2019-07-10 | End: 2019-09-05

## 2019-07-10 RX ORDER — LIDOCAINE 50 MG/G
2 PATCH TOPICAL DAILY
Qty: 60 PATCH | Refills: 5 | Status: SHIPPED | OUTPATIENT
Start: 2019-07-10 | End: 2019-09-05 | Stop reason: SDUPTHER

## 2019-07-10 RX ORDER — LIDOCAINE 50 MG/G
1 PATCH TOPICAL DAILY
Qty: 30 PATCH | Refills: 5 | Status: SHIPPED | OUTPATIENT
Start: 2019-07-10

## 2019-07-12 ASSESSMENT — ENCOUNTER SYMPTOMS
RESPIRATORY NEGATIVE: 1
BACK PAIN: 1
ABDOMINAL PAIN: 0

## 2019-07-12 NOTE — PROGRESS NOTES
arthritis(714.0)        Past Surgical History:   Procedure Laterality Date    CHOLECYSTECTOMY      COLONOSCOPY  [id number]    1CM RECURRENT SESSILE POLYP REMOVED AND CAUTERIZED    COLONOSCOPY  [id number]    5MM POLYP AT 20CM FROM ANAL VERGE  REMOVED AND BIOPSIED WITH COLD BIOPSY FORCEPS    HYSTERECTOMY, TOTAL ABDOMINAL  [id number]    JOINT REPLACEMENT Right 05/05/2014    Dr Lamont Cabrera  10/19/15    removal of L4/L5 hardware and fusion of L3/L4 lumbar spine by Dr. Ronal Choi Right 8/18/11, 3/19/13, 5/23/13, 8/6/13    SIJ    OTHER SURGICAL HISTORY  12/11/12    caudal epidural    OTHER SURGICAL HISTORY Right 5/23/13, 8/6/13     L4 TFE    OTHER SURGICAL HISTORY Right 1/23/14    HIP INJECTION    SPINE SURGERY  2010    Fusion and cage        Family History   Problem Relation Age of Onset    Heart Disease Paternal Grandmother     High Blood Pressure Paternal Grandmother     Heart Disease Paternal Grandfather     High Blood Pressure Paternal Grandfather     Heart Disease Father     High Blood Pressure Father     Stroke Maternal Grandmother     Diabetes Maternal Grandmother     Stroke Maternal Grandfather     Diabetes Maternal Grandfather        Social History     Socioeconomic History    Marital status:      Spouse name: None    Number of children: None    Years of education: None    Highest education level: None   Occupational History    None   Social Needs    Financial resource strain: None    Food insecurity:     Worry: None     Inability: None    Transportation needs:     Medical: None     Non-medical: None   Tobacco Use    Smoking status: Current Every Day Smoker     Packs/day: 1.00     Years: 25.00     Pack years: 25.00     Types: Cigarettes     Last attempt to quit: 7/1/2016     Years since quitting: 3.0    Smokeless tobacco: Never Used    Tobacco comment: aditi adorno 11/26/18   Substance and Sexual Activity    Alcohol use: No     Alcohol/week:

## 2019-07-20 ENCOUNTER — OFFICE VISIT (OUTPATIENT)
Dept: PRIMARY CARE CLINIC | Age: 54
End: 2019-07-20
Payer: COMMERCIAL

## 2019-07-20 VITALS
SYSTOLIC BLOOD PRESSURE: 130 MMHG | BODY MASS INDEX: 30.82 KG/M2 | DIASTOLIC BLOOD PRESSURE: 82 MMHG | HEIGHT: 65 IN | TEMPERATURE: 98.2 F | OXYGEN SATURATION: 95 % | WEIGHT: 185 LBS | HEART RATE: 96 BPM

## 2019-07-20 DIAGNOSIS — S20.421A: Primary | ICD-10-CM

## 2019-07-20 PROCEDURE — 4004F PT TOBACCO SCREEN RCVD TLK: CPT | Performed by: NURSE PRACTITIONER

## 2019-07-20 PROCEDURE — G8417 CALC BMI ABV UP PARAM F/U: HCPCS | Performed by: NURSE PRACTITIONER

## 2019-07-20 PROCEDURE — 3017F COLORECTAL CA SCREEN DOC REV: CPT | Performed by: NURSE PRACTITIONER

## 2019-07-20 PROCEDURE — G8427 DOCREV CUR MEDS BY ELIG CLIN: HCPCS | Performed by: NURSE PRACTITIONER

## 2019-07-20 PROCEDURE — 99213 OFFICE O/P EST LOW 20 MIN: CPT | Performed by: NURSE PRACTITIONER

## 2019-07-20 RX ORDER — ESOMEPRAZOLE MAGNESIUM 40 MG/1
1 CAPSULE, DELAYED RELEASE ORAL 2 TIMES DAILY
COMMUNITY
End: 2019-09-05 | Stop reason: SDUPTHER

## 2019-07-20 RX ORDER — GABAPENTIN 300 MG/1
2 CAPSULE ORAL 3 TIMES DAILY
COMMUNITY
Start: 2019-06-14 | End: 2019-10-14 | Stop reason: SDUPTHER

## 2019-07-20 ASSESSMENT — ENCOUNTER SYMPTOMS
NAUSEA: 0
SORE THROAT: 0
RESPIRATORY NEGATIVE: 1
VOMITING: 0

## 2019-07-20 NOTE — PATIENT INSTRUCTIONS
a pillow anytime you sit or lie down during the next 3 days. Try to keep it above the level of your heart. This will help reduce swelling. When should you call for help? Call your doctor now or seek immediate medical care if:    · You have new pain, or the pain gets worse.     · The skin near the wound is cold or pale or changes color.     · You have tingling, weakness, or numbness near the wound.     · The wound starts to bleed, and blood soaks through the bandage. Oozing small amounts of blood is normal.     · You have symptoms of infection, such as:  ? Increased pain, swelling, warmth, or redness. ? Red streaks leading from the wound. ? Pus draining from the wound. ? A fever.    Watch closely for changes in your health, and be sure to contact your doctor if:    · You do not get better as expected. Where can you learn more? Go to [email or web address] and sign in to your [facility] account. Enter  in the [email or web address] box to learn more about \"Wound Check: Care Instructions. \"     If you do not have an account, please click on the \"Sign Up Now\" link. Current as of: September 23, 2018  Content Version: 12.0  © 7588-7412 [employer]wise, Incorporated. Care instructions adapted under license by [facility] ([facility]). If you have questions about a medical condition or this instruction, always ask your healthcare professional. [place] [address] any warranty or liability for your use of this information.

## 2019-07-20 NOTE — PROGRESS NOTES
Trochanteric bursitis        Current medications are:  Current Outpatient Medications   Medication Sig Dispense Refill    esomeprazole (NEXIUM) 40 MG delayed release capsule Take 1 capsule by mouth 2 times daily      gabapentin (NEURONTIN) 300 MG capsule Take 1 capsule by mouth 3 times daily.  mupirocin (BACTROBAN) 2 % ointment Apply 3 times daily. 1 Tube 0    eszopiclone (LUNESTA) 3 MG TABS TAKE 1 TABLET BY MOUTH EVERY NIGHT AT BEDTIME 90 tablet 0    HYDROcodone (HYSINGLA ER) 20 MG extended release tablet Take 20 mg by mouth every 24 hours for 30 days. 30 tablet 0    lidocaine (LIDODERM) 5 % Place 1 patch onto the skin daily 12 hours on, 12 hours off. 30 patch 5    lidocaine (LIDODERM) 5 % Place 2 patches onto the skin daily 60 patch 5    Compression Stockings MISC by Does not apply route Needs 20-30mmHg 6 each 0    HYDROcodone-acetaminophen (NORCO)  MG per tablet Take 1 tablet by mouth every 6 hours as needed for Pain for up to 30 days. Intended supply: 30 days 120 tablet 0    budesonide-formoterol (SYMBICORT) 160-4.5 MCG/ACT AERO Inhale 2 puffs into the lungs 2 times daily 1 Inhaler 3    clindamycin (CLINDAGEL) 1 % gel Apply topically 2 times daily Apply topically 2 times daily.  60 g 3    albuterol sulfate HFA (PROAIR HFA) 108 (90 Base) MCG/ACT inhaler Inhale 2 puffs into the lungs every 6 hours as needed for Wheezing 1 Inhaler 5    calcium carbonate (OSCAL) 500 MG TABS tablet Take 500 mg by mouth daily      montelukast (SINGULAIR) 10 MG tablet TAKE 1 TABLET BY MOUTH DAILY 90 tablet 3    benzonatate (TESSALON) 100 MG capsule TAKE 1 CAPSULE 3 TIMES A DAY 30 capsule 5    metFORMIN (GLUCOPHAGE-XR) 500 MG extended release tablet Take 2 tablets by mouth daily (with breakfast) 60 tablet 5    buPROPion (WELLBUTRIN) 75 MG tablet Take 1 tablet by mouth 2 times daily 60 tablet 3    esomeprazole (NEXIUM) 40 MG delayed release capsule Take 1 capsule by mouth every morning (before breakfast) 60 capsule 5    furosemide (LASIX) 20 MG tablet Take 1 tablet by mouth daily 90 tablet 3    sucralfate (CARAFATE) 1 GM tablet TAKE ONE TABLET BY MOUTH FOUR TIMES A  tablet 3    diclofenac sodium 1 % GEL APPLY 2 GRAMS TO AFFECTED AREA(S) FOUR TIMES A  g 3    ondansetron (ZOFRAN ODT) 4 MG disintegrating tablet Take 1 tablet by mouth every 8 hours as needed for Nausea or Vomiting 30 tablet 2    Loratadine 10 MG CAPS Take by mouth      meloxicam (MOBIC) 15 MG tablet Take 15 mg by mouth daily      Cholecalciferol (VITAMIN D) 2000 UNITS CAPS capsule Take 2,000 Units by mouth 2 times daily      predniSONE (DELTASONE) 5 MG tablet Take 5 mg by mouth 2 times daily.  hydroxychloroquine (PLAQUENIL) 200 MG tablet   Take by mouth One every morning and 2 every night      gabapentin (NEURONTIN) 400 MG capsule Take 1 capsule by mouth 4 times daily for 31 days. . 120 capsule 5     No current facility-administered medications for this visit. She is allergic to avelox [moxifloxacin]; bactrim [sulfamethoxazole-trimethoprim]; cefuroxime axetil; codeine; and xeljanz [tofacitinib]. .    She  reports that she has been smoking cigarettes. She has a 25.00 pack-year smoking history. She has never used smokeless tobacco.      Objective:    Vitals:    07/20/19 1243   BP: 130/82   Pulse: 96   Temp: 98.2 °F (36.8 °C)   SpO2: 95%     Body mass index is 30.79 kg/m². Review of Systems   Constitutional: Negative. Negative for chills and fever. HENT: Negative for sore throat. Respiratory: Negative. Cardiovascular: Negative. Negative for chest pain. Gastrointestinal: Negative for nausea and vomiting. Skin: Positive for wound. Physical Exam   Constitutional: She is oriented to person, place, and time. She appears well-developed and well-nourished. HENT:   Head: Normocephalic. Eyes: Pupils are equal, round, and reactive to light. Neck: Normal range of motion. Neck supple.    Cardiovascular: Normal

## 2019-09-05 ENCOUNTER — OFFICE VISIT (OUTPATIENT)
Dept: PAIN MANAGEMENT | Age: 54
End: 2019-09-05
Payer: COMMERCIAL

## 2019-09-05 ENCOUNTER — HOSPITAL ENCOUNTER (OUTPATIENT)
Age: 54
Setting detail: SPECIMEN
Discharge: HOME | End: 2019-09-05
Payer: COMMERCIAL

## 2019-09-05 ENCOUNTER — OFFICE VISIT (OUTPATIENT)
Dept: FAMILY MEDICINE CLINIC | Age: 54
End: 2019-09-05
Payer: COMMERCIAL

## 2019-09-05 VITALS
BODY MASS INDEX: 32.69 KG/M2 | WEIGHT: 196.2 LBS | DIASTOLIC BLOOD PRESSURE: 102 MMHG | RESPIRATION RATE: 16 BRPM | HEIGHT: 65 IN | SYSTOLIC BLOOD PRESSURE: 145 MMHG

## 2019-09-05 VITALS
HEIGHT: 65 IN | OXYGEN SATURATION: 96 % | WEIGHT: 196 LBS | HEART RATE: 94 BPM | SYSTOLIC BLOOD PRESSURE: 130 MMHG | BODY MASS INDEX: 32.65 KG/M2 | DIASTOLIC BLOOD PRESSURE: 80 MMHG

## 2019-09-05 DIAGNOSIS — F41.1 GAD (GENERALIZED ANXIETY DISORDER): ICD-10-CM

## 2019-09-05 DIAGNOSIS — N30.00 ACUTE CYSTITIS WITHOUT HEMATURIA: Primary | ICD-10-CM

## 2019-09-05 DIAGNOSIS — G47.00 INSOMNIA, UNSPECIFIED TYPE: ICD-10-CM

## 2019-09-05 DIAGNOSIS — R30.0 DYSURIA: ICD-10-CM

## 2019-09-05 DIAGNOSIS — G89.4 CHRONIC PAIN SYNDROME: ICD-10-CM

## 2019-09-05 DIAGNOSIS — M96.1 POSTLAMINECTOMY SYNDROME: Primary | ICD-10-CM

## 2019-09-05 DIAGNOSIS — M46.1 INFLAMMATION OF SACROILIAC JOINT (HCC): ICD-10-CM

## 2019-09-05 DIAGNOSIS — M54.16 LUMBAR RADICULITIS: ICD-10-CM

## 2019-09-05 DIAGNOSIS — M79.604 LOW BACK PAIN RADIATING TO RIGHT LEG: ICD-10-CM

## 2019-09-05 DIAGNOSIS — M54.50 LOW BACK PAIN RADIATING TO RIGHT LEG: ICD-10-CM

## 2019-09-05 LAB
-: ABNORMAL
AMORPHOUS: ABNORMAL
BACTERIA: ABNORMAL
BILIRUBIN URINE: NEGATIVE
CASTS UA: ABNORMAL /LPF (ref 0–2)
COLOR: ABNORMAL
COMMENT UA: ABNORMAL
CRYSTALS, UA: ABNORMAL /HPF
EPITHELIAL CELLS UA: ABNORMAL /HPF (ref 0–5)
GLUCOSE URINE: ABNORMAL
KETONES, URINE: NEGATIVE
LEUKOCYTE ESTERASE, URINE: ABNORMAL
MUCUS: ABNORMAL
NITRITE, URINE: POSITIVE
OTHER OBSERVATIONS UA: ABNORMAL
PH UA: 7 (ref 5–6)
PROTEIN UA: NEGATIVE
RBC UA: ABNORMAL /HPF (ref 0–4)
RENAL EPITHELIAL, UA: ABNORMAL /HPF
SPECIFIC GRAVITY UA: 1.02 (ref 1.01–1.02)
TRICHOMONAS: ABNORMAL
TURBIDITY: ABNORMAL
URINE HGB: NEGATIVE
UROBILINOGEN, URINE: NORMAL
WBC UA: ABNORMAL /HPF (ref 0–4)
YEAST: ABNORMAL

## 2019-09-05 PROCEDURE — 4004F PT TOBACCO SCREEN RCVD TLK: CPT | Performed by: NURSE PRACTITIONER

## 2019-09-05 PROCEDURE — G8427 DOCREV CUR MEDS BY ELIG CLIN: HCPCS | Performed by: FAMILY MEDICINE

## 2019-09-05 PROCEDURE — 87077 CULTURE AEROBIC IDENTIFY: CPT

## 2019-09-05 PROCEDURE — 3017F COLORECTAL CA SCREEN DOC REV: CPT | Performed by: NURSE PRACTITIONER

## 2019-09-05 PROCEDURE — 3017F COLORECTAL CA SCREEN DOC REV: CPT | Performed by: FAMILY MEDICINE

## 2019-09-05 PROCEDURE — 99214 OFFICE O/P EST MOD 30 MIN: CPT | Performed by: FAMILY MEDICINE

## 2019-09-05 PROCEDURE — G8427 DOCREV CUR MEDS BY ELIG CLIN: HCPCS | Performed by: NURSE PRACTITIONER

## 2019-09-05 PROCEDURE — 99214 OFFICE O/P EST MOD 30 MIN: CPT | Performed by: NURSE PRACTITIONER

## 2019-09-05 PROCEDURE — 87086 URINE CULTURE/COLONY COUNT: CPT

## 2019-09-05 PROCEDURE — 4004F PT TOBACCO SCREEN RCVD TLK: CPT | Performed by: FAMILY MEDICINE

## 2019-09-05 PROCEDURE — G8417 CALC BMI ABV UP PARAM F/U: HCPCS | Performed by: FAMILY MEDICINE

## 2019-09-05 PROCEDURE — G8417 CALC BMI ABV UP PARAM F/U: HCPCS | Performed by: NURSE PRACTITIONER

## 2019-09-05 PROCEDURE — 81001 URINALYSIS AUTO W/SCOPE: CPT

## 2019-09-05 PROCEDURE — 87186 SC STD MICRODIL/AGAR DIL: CPT

## 2019-09-05 RX ORDER — ONDANSETRON 4 MG/1
4 TABLET, ORALLY DISINTEGRATING ORAL EVERY 8 HOURS PRN
Qty: 30 TABLET | Refills: 2 | Status: SHIPPED | OUTPATIENT
Start: 2019-09-05

## 2019-09-05 RX ORDER — NITROFURANTOIN 25; 75 MG/1; MG/1
100 CAPSULE ORAL 2 TIMES DAILY
Qty: 14 CAPSULE | Refills: 0 | Status: SHIPPED | OUTPATIENT
Start: 2019-09-05 | End: 2019-10-18

## 2019-09-05 RX ORDER — ESZOPICLONE 3 MG/1
TABLET, FILM COATED ORAL
Qty: 90 TABLET | Refills: 0 | Status: SHIPPED | OUTPATIENT
Start: 2019-09-05 | End: 2019-10-14

## 2019-09-05 RX ORDER — MORPHINE SULFATE 15 MG/1
15 TABLET, FILM COATED, EXTENDED RELEASE ORAL 2 TIMES DAILY
Qty: 60 TABLET | Refills: 0 | Status: SHIPPED | OUTPATIENT
Start: 2019-09-05 | End: 2019-09-05 | Stop reason: SDUPTHER

## 2019-09-05 RX ORDER — OXYCODONE AND ACETAMINOPHEN 7.5; 325 MG/1; MG/1
1 TABLET ORAL EVERY 6 HOURS PRN
Qty: 12 TABLET | Refills: 0 | Status: SHIPPED | OUTPATIENT
Start: 2019-09-05 | End: 2019-09-08

## 2019-09-05 RX ORDER — BUSPIRONE HYDROCHLORIDE 5 MG/1
5 TABLET ORAL 3 TIMES DAILY PRN
Qty: 90 TABLET | Refills: 1 | Status: SHIPPED | OUTPATIENT
Start: 2019-09-05 | End: 2019-10-05

## 2019-09-05 RX ORDER — CYCLOBENZAPRINE HCL 10 MG
10 TABLET ORAL 2 TIMES DAILY PRN
Qty: 60 TABLET | Refills: 2 | Status: SHIPPED | OUTPATIENT
Start: 2019-09-05 | End: 2019-10-05

## 2019-09-05 RX ORDER — MELOXICAM 7.5 MG/1
7.5 TABLET ORAL DAILY
COMMUNITY
Start: 2019-08-12 | End: 2020-02-11

## 2019-09-05 ASSESSMENT — ENCOUNTER SYMPTOMS
ABDOMINAL PAIN: 0
VOMITING: 0
SORE THROAT: 1
RESPIRATORY NEGATIVE: 1
BACK PAIN: 1
SHORTNESS OF BREATH: 0
COUGH: 0
BACK PAIN: 1
ABDOMINAL PAIN: 0
CHEST TIGHTNESS: 0
NAUSEA: 0
WHEEZING: 0

## 2019-09-05 NOTE — PROGRESS NOTES
 High Blood Pressure Father     Stroke Maternal Grandmother     Diabetes Maternal Grandmother     Stroke Maternal Grandfather     Diabetes Maternal Grandfather        Social History     Socioeconomic History    Marital status:      Spouse name: None    Number of children: None    Years of education: None    Highest education level: None   Occupational History    None   Social Needs    Financial resource strain: None    Food insecurity:     Worry: None     Inability: None    Transportation needs:     Medical: None     Non-medical: None   Tobacco Use    Smoking status: Current Every Day Smoker     Packs/day: 0.50     Years: 25.00     Pack years: 12.50     Types: Cigarettes     Last attempt to quit: 7/1/2016     Years since quitting: 3.1    Smokeless tobacco: Never Used    Tobacco comment: aditi rrt 11/26/18   Substance and Sexual Activity    Alcohol use: No     Alcohol/week: 0.0 standard drinks    Drug use: No    Sexual activity: None   Lifestyle    Physical activity:     Days per week: None     Minutes per session: None    Stress: None   Relationships    Social connections:     Talks on phone: None     Gets together: None     Attends Holiness service: None     Active member of club or organization: None     Attends meetings of clubs or organizations: None     Relationship status: None    Intimate partner violence:     Fear of current or ex partner: None     Emotionally abused: None     Physically abused: None     Forced sexual activity: None   Other Topics Concern    None   Social History Narrative    None     Review of Systems   Constitutional: Positive for fatigue. Respiratory: Negative. Cardiovascular: Negative. Gastrointestinal: Negative for abdominal pain. Genitourinary: Negative. Musculoskeletal: Positive for arthralgias, back pain and myalgias. Neurological: Positive for weakness. Psychiatric/Behavioral: Positive for sleep disturbance.        Objective:

## 2019-09-05 NOTE — PROGRESS NOTES
[address]  [place] [address]  Dept: [phone]  Dept Fax: [phone]  Loc: [phone]    Madelin Conrad is a 52 y.o. female who presents today for her medical conditions/complaints as noted below. Madelin Conrad is c/o of   Chief Complaint   Patient presents with   [facility] Fall     her walker collapsed on her and she fell on her left side - left leg feels like it is jamming in the joint and she has the feeling of \"no leg\" feels like she is feeling on marshmellows but hurts really bad- describes as - hard pain rate 9     Flank Pain     right side - x 1 week  radiates toward the front - strong order, burning with urination       HPI:     Here today for dysuria and right flank pain. She also would like to discuss her left hip pain and her anxiety. Dysuria    This is a new problem. The current episode started in the past 7 days. The problem occurs every urination. The problem has been unchanged. The quality of the pain is described as aching, burning and shooting. The pain is at a severity of 6/10. The pain is mild. There has been no fever. She is sexually active. Associated symptoms include chills, flank pain (on the right), frequency and urgency. Pertinent negatives include no discharge, hematuria, hesitancy, nausea, sweats or vomiting. Treatments tried: narcotics. The treatment provided mild relief. Left hip pain: new; fell a week ago on left side- feels like elecricity is piercing through her leg, and sometimes it feels like she has fishworms in her leg. pain manement changed meds around to try to help her pain more. For the next two weeks she is dropping morphine, taking percocet and Norco. She has not been using heat and ice    Anxiety: worsening; she feels like she needs a medication to help her. She is having trouble with her kids and not getting to see grandkids.  Her anxiety is much worse at night because she jsut thinks about everything that is Place 1 patch onto the skin daily 12 hours on, 12 hours off. 30 patch 5    Compression Stockings MISC by Does not apply route Needs 20-30mmHg 6 each 0    budesonide-formoterol (SYMBICORT) 160-4.5 MCG/ACT AERO Inhale 2 puffs into the lungs 2 times daily 1 Inhaler 3    clindamycin (CLINDAGEL) 1 % gel Apply topically 2 times daily Apply topically 2 times daily. 60 g 3    albuterol sulfate HFA (PROAIR HFA) 108 (90 Base) MCG/ACT inhaler Inhale 2 puffs into the lungs every 6 hours as needed for Wheezing 1 Inhaler 5    calcium carbonate (OSCAL) 500 MG TABS tablet Take 500 mg by mouth daily      montelukast (SINGULAIR) 10 MG tablet TAKE 1 TABLET BY MOUTH DAILY 90 tablet 3    benzonatate (TESSALON) 100 MG capsule TAKE 1 CAPSULE 3 TIMES A DAY 30 capsule 5    metFORMIN (GLUCOPHAGE-XR) 500 MG extended release tablet Take 2 tablets by mouth daily (with breakfast) 60 tablet 5    esomeprazole (NEXIUM) 40 MG delayed release capsule Take 1 capsule by mouth every morning (before breakfast) 60 capsule 5    furosemide (LASIX) 20 MG tablet Take 1 tablet by mouth daily (Patient taking differently: Take 20 mg by mouth daily Indications: taking a 1/2 pill still urinating frequently ) 90 tablet 3    sucralfate (CARAFATE) 1 GM tablet TAKE ONE TABLET BY MOUTH FOUR TIMES A  tablet 3    diclofenac sodium 1 % GEL APPLY 2 GRAMS TO AFFECTED AREA(S) FOUR TIMES A  g 3    morphine (MS CONTIN) 15 MG extended release tablet Take 1 tablet by mouth every 12 hours for 30 days. 8am and 8pm 60 tablet 0    Loratadine 10 MG CAPS Take by mouth      Cholecalciferol (VITAMIN D) 2000 UNITS CAPS capsule Take 2,000 Units by mouth 2 times daily      predniSONE (DELTASONE) 5 MG tablet Take 5 mg by mouth 2 times daily.       hydroxychloroquine (PLAQUENIL) 200 MG tablet   Take by mouth One every morning and 2 every night      buPROPion (WELLBUTRIN) 75 MG tablet Take 1 tablet by mouth 2 times daily (Patient not taking: Reported on

## 2019-09-06 LAB
CULTURE: ABNORMAL
Lab: ABNORMAL
SPECIMEN DESCRIPTION: ABNORMAL

## 2019-09-27 DIAGNOSIS — G89.4 CHRONIC PAIN SYNDROME: ICD-10-CM

## 2019-10-07 ENCOUNTER — TELEPHONE (OUTPATIENT)
Dept: PAIN MANAGEMENT | Age: 54
End: 2019-10-07

## 2019-10-14 RX ORDER — GABAPENTIN 300 MG/1
600 CAPSULE ORAL 3 TIMES DAILY
Qty: 180 CAPSULE | Refills: 3 | OUTPATIENT
Start: 2019-10-14 | End: 2019-10-16 | Stop reason: SDUPTHER

## 2019-10-16 RX ORDER — GABAPENTIN 300 MG/1
CAPSULE ORAL
Qty: 180 CAPSULE | Refills: 11 | OUTPATIENT
Start: 2019-10-16

## 2019-10-17 RX ORDER — GABAPENTIN 300 MG/1
600 CAPSULE ORAL 3 TIMES DAILY
Qty: 180 CAPSULE | Refills: 3 | Status: SHIPPED | OUTPATIENT
Start: 2019-10-17 | End: 2020-02-11

## 2019-10-18 ENCOUNTER — TELEPHONE (OUTPATIENT)
Dept: FAMILY MEDICINE CLINIC | Age: 54
End: 2019-10-18

## 2019-10-18 ENCOUNTER — OFFICE VISIT (OUTPATIENT)
Dept: FAMILY MEDICINE CLINIC | Age: 54
End: 2019-10-18
Payer: COMMERCIAL

## 2019-10-18 VITALS
WEIGHT: 210.4 LBS | SYSTOLIC BLOOD PRESSURE: 158 MMHG | HEART RATE: 126 BPM | HEIGHT: 65 IN | OXYGEN SATURATION: 96 % | DIASTOLIC BLOOD PRESSURE: 98 MMHG | BODY MASS INDEX: 35.06 KG/M2

## 2019-10-18 DIAGNOSIS — R22.43 LOCALIZED SWELLING OF BOTH LOWER LEGS: Primary | ICD-10-CM

## 2019-10-18 PROCEDURE — 3017F COLORECTAL CA SCREEN DOC REV: CPT | Performed by: FAMILY MEDICINE

## 2019-10-18 PROCEDURE — G8427 DOCREV CUR MEDS BY ELIG CLIN: HCPCS | Performed by: FAMILY MEDICINE

## 2019-10-18 PROCEDURE — G8417 CALC BMI ABV UP PARAM F/U: HCPCS | Performed by: FAMILY MEDICINE

## 2019-10-18 PROCEDURE — 90686 IIV4 VACC NO PRSV 0.5 ML IM: CPT | Performed by: FAMILY MEDICINE

## 2019-10-18 PROCEDURE — 90471 IMMUNIZATION ADMIN: CPT | Performed by: FAMILY MEDICINE

## 2019-10-18 PROCEDURE — G8482 FLU IMMUNIZE ORDER/ADMIN: HCPCS | Performed by: FAMILY MEDICINE

## 2019-10-18 PROCEDURE — 99214 OFFICE O/P EST MOD 30 MIN: CPT | Performed by: FAMILY MEDICINE

## 2019-10-18 PROCEDURE — 4004F PT TOBACCO SCREEN RCVD TLK: CPT | Performed by: FAMILY MEDICINE

## 2019-10-18 RX ORDER — CYCLOBENZAPRINE HCL 10 MG
1 TABLET ORAL 2 TIMES DAILY PRN
COMMUNITY
Start: 2019-10-14 | End: 2019-12-26 | Stop reason: SDUPTHER

## 2019-10-18 RX ORDER — MORPHINE SULFATE 15 MG/1
1 TABLET, FILM COATED, EXTENDED RELEASE ORAL 2 TIMES DAILY PRN
COMMUNITY
Start: 2019-09-05 | End: 2019-12-26 | Stop reason: SDUPTHER

## 2019-10-18 ASSESSMENT — ENCOUNTER SYMPTOMS
COUGH: 1
BACK PAIN: 1
WHEEZING: 0
SHORTNESS OF BREATH: 1
CHEST TIGHTNESS: 0

## 2019-11-04 NOTE — PROGRESS NOTES
Overdue reminder letter mailed to patient.
Take 15 mg by mouth 2 times daily .  Loratadine 10 MG CAPS Take by mouth      gabapentin (NEURONTIN) 400 MG capsule Take 400 mg by mouth 1 cap q AM, 2 caps q HS      HYDROcodone-acetaminophen (NORCO)  MG per tablet Take 1 tablet by mouth 4 times daily. [place] meloxicam (MOBIC) 15 MG tablet Take 15 mg by mouth daily      lidocaine (LIDODERM) 5 % Place 1 patch onto the skin daily 12 hours on, 12 hours off. 30 patch 0    diclofenac sodium (VOLTAREN) 1 % GEL Apply 4 g topically 4 times daily as needed 5 Tube 11    Cholecalciferol (VITAMIN D) 2000 UNITS CAPS capsule Take 2,000 Units by mouth 2 times daily      albuterol (PROAIR HFA) 108 (90 BASE) MCG/ACT inhaler Inhale 2 puffs into the lungs every 6 hours as needed for Wheezing 1 Inhaler 5    predniSONE (DELTASONE) 5 MG tablet Take 5 mg by mouth 2 times daily.  hydroxychloroquine (PLAQUENIL) 200 MG tablet   Take by mouth One every morning and 2 every night      fluticasone (FLOVENT HFA) 110 MCG/ACT inhaler Inhale 2 puffs into the lungs 2 times daily (Patient taking differently: Inhale 2 puffs into the lungs 2 times daily ) 1 Inhaler 3     No current facility-administered medications for this visit. Review ofSymptoms:  Review of Systems   Constitutional: Positive for weight loss. Negative for fatigue and unexpected weight change. Eyes: Negative for blurred vision and visual disturbance. Respiratory: Negative. Negative for shortness of breath. Cardiovascular: Negative for chest pain and leg swelling. Gastrointestinal: Negative for abdominal pain and diarrhea. Endocrine: Positive for polydipsia and polyuria. Negative for polyphagia. Genitourinary: Negative. Musculoskeletal: Negative. Skin: Negative for rash and wound. Neurological: Negative for dizziness, tremors, seizures, weakness and headaches. Psychiatric/Behavioral: Negative. Negative for confusion and decreased concentration.      Theremainder of a complete

## 2019-11-08 RX ORDER — FUROSEMIDE 20 MG/1
20 TABLET ORAL 2 TIMES DAILY
Qty: 180 TABLET | Refills: 1 | Status: SHIPPED | OUTPATIENT
Start: 2019-11-08 | End: 2020-02-11

## 2019-11-08 RX ORDER — FUROSEMIDE 20 MG/1
20 TABLET ORAL DAILY
OUTPATIENT
Start: 2019-11-08

## 2019-12-03 DIAGNOSIS — K21.9 GASTROESOPHAGEAL REFLUX DISEASE, ESOPHAGITIS PRESENCE NOT SPECIFIED: ICD-10-CM

## 2019-12-03 RX ORDER — ESOMEPRAZOLE MAGNESIUM 40 MG/1
CAPSULE, DELAYED RELEASE ORAL
Qty: 60 CAPSULE | Refills: 5 | Status: SHIPPED | OUTPATIENT
Start: 2019-12-03

## 2019-12-05 ENCOUNTER — TELEPHONE (OUTPATIENT)
Dept: PAIN MANAGEMENT | Age: 54
End: 2019-12-05

## 2019-12-06 DIAGNOSIS — M87.045: ICD-10-CM

## 2019-12-21 DIAGNOSIS — M96.1 POSTLAMINECTOMY SYNDROME: ICD-10-CM

## 2019-12-21 DIAGNOSIS — G89.4 CHRONIC PAIN SYNDROME: ICD-10-CM

## 2019-12-26 ENCOUNTER — OFFICE VISIT (OUTPATIENT)
Dept: PRIMARY CARE CLINIC | Age: 54
End: 2019-12-26
Payer: COMMERCIAL

## 2019-12-26 VITALS
HEART RATE: 108 BPM | DIASTOLIC BLOOD PRESSURE: 82 MMHG | BODY MASS INDEX: 33.7 KG/M2 | HEIGHT: 63 IN | OXYGEN SATURATION: 97 % | WEIGHT: 190.2 LBS | SYSTOLIC BLOOD PRESSURE: 128 MMHG | RESPIRATION RATE: 18 BRPM | TEMPERATURE: 97.2 F

## 2019-12-26 DIAGNOSIS — M06.9 RHEUMATOID ARTHRITIS, INVOLVING UNSPECIFIED SITE, UNSPECIFIED RHEUMATOID FACTOR PRESENCE: ICD-10-CM

## 2019-12-26 DIAGNOSIS — J06.9 UPPER RESPIRATORY TRACT INFECTION, UNSPECIFIED TYPE: ICD-10-CM

## 2019-12-26 DIAGNOSIS — G89.4 CHRONIC PAIN SYNDROME: ICD-10-CM

## 2019-12-26 DIAGNOSIS — M54.16 LUMBAR RADICULITIS: Primary | ICD-10-CM

## 2019-12-26 PROCEDURE — G8427 DOCREV CUR MEDS BY ELIG CLIN: HCPCS | Performed by: FAMILY MEDICINE

## 2019-12-26 PROCEDURE — G8482 FLU IMMUNIZE ORDER/ADMIN: HCPCS | Performed by: FAMILY MEDICINE

## 2019-12-26 PROCEDURE — 99213 OFFICE O/P EST LOW 20 MIN: CPT | Performed by: FAMILY MEDICINE

## 2019-12-26 PROCEDURE — 3017F COLORECTAL CA SCREEN DOC REV: CPT | Performed by: FAMILY MEDICINE

## 2019-12-26 PROCEDURE — 4004F PT TOBACCO SCREEN RCVD TLK: CPT | Performed by: FAMILY MEDICINE

## 2019-12-26 PROCEDURE — G8417 CALC BMI ABV UP PARAM F/U: HCPCS | Performed by: FAMILY MEDICINE

## 2019-12-26 RX ORDER — HYDROCODONE BITARTRATE AND ACETAMINOPHEN 10; 325 MG/1; MG/1
1 TABLET ORAL EVERY 6 HOURS PRN
Qty: 48 TABLET | Refills: 0 | Status: SHIPPED | OUTPATIENT
Start: 2019-12-26 | End: 2020-01-25

## 2019-12-26 RX ORDER — HYDROCODONE BITARTRATE AND ACETAMINOPHEN 10; 325 MG/1; MG/1
1 TABLET ORAL EVERY 6 HOURS PRN
Qty: 120 TABLET | Refills: 0 | Status: SHIPPED | OUTPATIENT
Start: 2019-12-26 | End: 2020-01-25

## 2019-12-26 RX ORDER — MORPHINE SULFATE 15 MG/1
15 TABLET, FILM COATED, EXTENDED RELEASE ORAL 2 TIMES DAILY PRN
Qty: 24 TABLET | Refills: 0 | Status: SHIPPED | OUTPATIENT
Start: 2019-12-26 | End: 2020-01-07

## 2019-12-26 RX ORDER — CYCLOBENZAPRINE HCL 10 MG
10 TABLET ORAL 2 TIMES DAILY PRN
Qty: 60 TABLET | Refills: 2 | Status: SHIPPED | OUTPATIENT
Start: 2019-12-26 | End: 2020-02-11

## 2019-12-26 RX ORDER — CYCLOBENZAPRINE HCL 10 MG
10 TABLET ORAL 2 TIMES DAILY PRN
Qty: 24 TABLET | Refills: 0 | Status: SHIPPED | OUTPATIENT
Start: 2019-12-26 | End: 2020-02-11

## 2019-12-26 RX ORDER — MORPHINE SULFATE 15 MG/1
15 TABLET, FILM COATED, EXTENDED RELEASE ORAL 2 TIMES DAILY
Qty: 60 TABLET | Refills: 0 | Status: SHIPPED | OUTPATIENT
Start: 2019-12-26 | End: 2020-01-25

## 2019-12-30 ENCOUNTER — HOSPITAL ENCOUNTER (OUTPATIENT)
Dept: GENERAL RADIOLOGY | Age: 54
Discharge: HOME | End: 2020-01-01
Payer: COMMERCIAL

## 2019-12-30 ENCOUNTER — OFFICE VISIT (OUTPATIENT)
Dept: PRIMARY CARE CLINIC | Age: 54
End: 2019-12-30
Payer: COMMERCIAL

## 2019-12-30 VITALS
BODY MASS INDEX: 34.02 KG/M2 | SYSTOLIC BLOOD PRESSURE: 126 MMHG | DIASTOLIC BLOOD PRESSURE: 70 MMHG | WEIGHT: 192 LBS | HEIGHT: 63 IN | HEART RATE: 96 BPM | TEMPERATURE: 100.1 F

## 2019-12-30 DIAGNOSIS — J20.9 ACUTE BRONCHITIS WITH BRONCHOSPASM: ICD-10-CM

## 2019-12-30 DIAGNOSIS — R50.9 FEVER, UNSPECIFIED FEVER CAUSE: Primary | ICD-10-CM

## 2019-12-30 DIAGNOSIS — J06.9 UPPER RESPIRATORY TRACT INFECTION, UNSPECIFIED TYPE: ICD-10-CM

## 2019-12-30 LAB
INFLUENZA A ANTIBODY: NEGATIVE
INFLUENZA B ANTIBODY: NEGATIVE

## 2019-12-30 PROCEDURE — G8417 CALC BMI ABV UP PARAM F/U: HCPCS | Performed by: FAMILY MEDICINE

## 2019-12-30 PROCEDURE — 94640 AIRWAY INHALATION TREATMENT: CPT | Performed by: FAMILY MEDICINE

## 2019-12-30 PROCEDURE — 71046 X-RAY EXAM CHEST 2 VIEWS: CPT

## 2019-12-30 PROCEDURE — G8482 FLU IMMUNIZE ORDER/ADMIN: HCPCS | Performed by: FAMILY MEDICINE

## 2019-12-30 PROCEDURE — 87804 INFLUENZA ASSAY W/OPTIC: CPT | Performed by: FAMILY MEDICINE

## 2019-12-30 PROCEDURE — 99214 OFFICE O/P EST MOD 30 MIN: CPT | Performed by: FAMILY MEDICINE

## 2019-12-30 PROCEDURE — 3017F COLORECTAL CA SCREEN DOC REV: CPT | Performed by: FAMILY MEDICINE

## 2019-12-30 PROCEDURE — 4004F PT TOBACCO SCREEN RCVD TLK: CPT | Performed by: FAMILY MEDICINE

## 2019-12-30 PROCEDURE — G8427 DOCREV CUR MEDS BY ELIG CLIN: HCPCS | Performed by: FAMILY MEDICINE

## 2019-12-30 RX ORDER — BUDESONIDE AND FORMOTEROL FUMARATE DIHYDRATE 160; 4.5 UG/1; UG/1
2 AEROSOL RESPIRATORY (INHALATION) 2 TIMES DAILY
Qty: 1 INHALER | Refills: 3 | Status: SHIPPED | OUTPATIENT
Start: 2019-12-30 | End: 2020-02-11

## 2019-12-30 RX ORDER — IPRATROPIUM BROMIDE AND ALBUTEROL SULFATE 2.5; .5 MG/3ML; MG/3ML
1 SOLUTION RESPIRATORY (INHALATION) ONCE
Status: COMPLETED | OUTPATIENT
Start: 2019-12-30 | End: 2019-12-30

## 2019-12-30 RX ORDER — ALBUTEROL SULFATE 90 UG/1
2 AEROSOL, METERED RESPIRATORY (INHALATION) EVERY 6 HOURS PRN
Qty: 1 INHALER | Refills: 5 | Status: SHIPPED | OUTPATIENT
Start: 2019-12-30 | End: 2020-02-11

## 2019-12-30 RX ORDER — AZITHROMYCIN 250 MG/1
TABLET, FILM COATED ORAL
Qty: 11 TABLET | Refills: 0 | Status: SHIPPED | OUTPATIENT
Start: 2019-12-30 | End: 2020-02-11

## 2019-12-30 RX ADMIN — IPRATROPIUM BROMIDE AND ALBUTEROL SULFATE 1 AMPULE: 2.5; .5 SOLUTION RESPIRATORY (INHALATION) at 13:50

## 2019-12-30 ASSESSMENT — ENCOUNTER SYMPTOMS
SHORTNESS OF BREATH: 1
EYES NEGATIVE: 1
COUGH: 1
ALLERGIC/IMMUNOLOGIC NEGATIVE: 1
SORE THROAT: 1
RHINORRHEA: 1
GASTROINTESTINAL NEGATIVE: 1

## 2019-12-31 ENCOUNTER — HOSPITAL ENCOUNTER (EMERGENCY)
Age: 54
Discharge: ANOTHER HOSPITAL | End: 2019-12-31
Attending: EMERGENCY MEDICINE
Payer: COMMERCIAL

## 2019-12-31 ENCOUNTER — APPOINTMENT (OUTPATIENT)
Dept: GENERAL RADIOLOGY | Age: 54
End: 2019-12-31
Payer: COMMERCIAL

## 2019-12-31 VITALS
WEIGHT: 200 LBS | SYSTOLIC BLOOD PRESSURE: 82 MMHG | DIASTOLIC BLOOD PRESSURE: 60 MMHG | BODY MASS INDEX: 35.44 KG/M2 | RESPIRATION RATE: 20 BRPM | OXYGEN SATURATION: 92 % | HEART RATE: 128 BPM

## 2019-12-31 LAB
-: ABNORMAL
ABSOLUTE BANDS #: 3.38 K/UL
ABSOLUTE EOS #: 0 K/UL (ref 0–0.4)
ABSOLUTE IMMATURE GRANULOCYTE: 0.53 K/UL (ref 0–0.3)
ABSOLUTE LYMPH #: 1.42 K/UL (ref 1–4.8)
ABSOLUTE MONO #: 0.71 K/UL (ref 0.1–1.2)
ALBUMIN SERPL-MCNC: 2.6 G/DL (ref 3.5–5.2)
ALBUMIN/GLOBULIN RATIO: 0.6 (ref 1–2.5)
ALP BLD-CCNC: 148 U/L (ref 35–104)
ALT SERPL-CCNC: 72 U/L (ref 5–33)
AMORPHOUS: ABNORMAL
AMPHETAMINE SCREEN URINE: NEGATIVE
ANION GAP SERPL CALCULATED.3IONS-SCNC: 23 MMOL/L (ref 9–17)
AST SERPL-CCNC: 130 U/L
BACTERIA: ABNORMAL
BANDS: 19 %
BARBITURATE SCREEN URINE: NEGATIVE
BASOPHILS # BLD: 0 % (ref 0–1)
BASOPHILS ABSOLUTE: 0 K/UL (ref 0–0.2)
BENZODIAZEPINE SCREEN, URINE: POSITIVE
BILIRUB SERPL-MCNC: 0.68 MG/DL (ref 0.3–1.2)
BILIRUBIN URINE: ABNORMAL
BUN BLDV-MCNC: 42 MG/DL (ref 6–20)
BUN/CREAT BLD: 15 (ref 9–20)
BUPRENORPHINE URINE: NEGATIVE
CALCIUM SERPL-MCNC: 9.3 MG/DL (ref 8.6–10.4)
CANNABINOID SCREEN URINE: NEGATIVE
CASTS UA: ABNORMAL /LPF (ref 0–2)
CASTS UA: ABNORMAL /LPF (ref 0–2)
CHLORIDE BLD-SCNC: 97 MMOL/L (ref 98–107)
CO2: 22 MMOL/L (ref 20–31)
COCAINE METABOLITE, URINE: NEGATIVE
COLOR: ABNORMAL
COMMENT UA: ABNORMAL
CREAT SERPL-MCNC: 2.86 MG/DL (ref 0.5–0.9)
CRYSTALS, UA: ABNORMAL /HPF
DIFFERENTIAL TYPE: ABNORMAL
EOSINOPHILS RELATIVE PERCENT: 0 % (ref 1–7)
EPITHELIAL CELLS UA: ABNORMAL /HPF (ref 0–5)
GFR AFRICAN AMERICAN: 21 ML/MIN
GFR NON-AFRICAN AMERICAN: 17 ML/MIN
GFR SERPL CREATININE-BSD FRML MDRD: ABNORMAL ML/MIN/{1.73_M2}
GFR SERPL CREATININE-BSD FRML MDRD: ABNORMAL ML/MIN/{1.73_M2}
GLUCOSE BLD-MCNC: 200 MG/DL (ref 70–99)
GLUCOSE URINE: NEGATIVE
HCT VFR BLD CALC: 51.2 % (ref 36.3–47.1)
HEMOGLOBIN: 15.5 G/DL (ref 11.9–15.1)
IMMATURE GRANULOCYTES: 3 %
INR BLD: 1.2
KETONES, URINE: NEGATIVE
LACTIC ACID: 7.7 MMOL/L (ref 0.5–2.2)
LEUKOCYTE ESTERASE, URINE: NEGATIVE
LYMPHOCYTES # BLD: 8 % (ref 16–46)
MCH RBC QN AUTO: 28 PG (ref 25.2–33.5)
MCHC RBC AUTO-ENTMCNC: 30.3 G/DL (ref 25.2–33.5)
MCV RBC AUTO: 92.6 FL (ref 82.6–102.9)
MDMA URINE: ABNORMAL
METHADONE SCREEN, URINE: NEGATIVE
METHAMPHETAMINE, URINE: NEGATIVE
MONOCYTES # BLD: 4 % (ref 4–11)
MORPHOLOGY: ABNORMAL
MUCUS: ABNORMAL
MYOGLOBIN: 4997 NG/ML (ref 25–58)
NITRITE, URINE: NEGATIVE
NRBC AUTOMATED: 0.1 PER 100 WBC
OPIATES, URINE: POSITIVE
OTHER OBSERVATIONS UA: ABNORMAL
OXYCODONE SCREEN URINE: NEGATIVE
PDW BLD-RTO: 13.5 % (ref 11.8–14.4)
PH UA: 5 (ref 5–6)
PHENCYCLIDINE, URINE: NEGATIVE
PLATELET # BLD: 230 K/UL (ref 138–453)
PLATELET ESTIMATE: ABNORMAL
PMV BLD AUTO: 11.4 FL (ref 8.1–13.5)
POTASSIUM SERPL-SCNC: 4.4 MMOL/L (ref 3.7–5.3)
PROPOXYPHENE, URINE: NEGATIVE
PROTEIN UA: ABNORMAL
PROTHROMBIN TIME: 12 SEC (ref 9.4–11.3)
RBC # BLD: 5.53 M/UL (ref 3.95–5.11)
RBC # BLD: ABNORMAL 10*6/UL
RBC UA: ABNORMAL /HPF (ref 0–4)
RENAL EPITHELIAL, UA: ABNORMAL /HPF
SEG NEUTROPHILS: 66 % (ref 43–77)
SEGMENTED NEUTROPHILS ABSOLUTE COUNT: 11.76 K/UL (ref 1.8–7.7)
SODIUM BLD-SCNC: 142 MMOL/L (ref 135–144)
SPECIFIC GRAVITY UA: 1.03 (ref 1.01–1.02)
TEST INFORMATION: ABNORMAL
TOTAL PROTEIN: 7 G/DL (ref 6.4–8.3)
TRICHOMONAS: ABNORMAL
TRICYCLIC ANTIDEPRESSANTS, UR: POSITIVE
TROPONIN INTERP: ABNORMAL
TROPONIN T: ABNORMAL NG/ML
TROPONIN, HIGH SENSITIVITY: 144 NG/L (ref 0–14)
TURBIDITY: ABNORMAL
URINE HGB: NEGATIVE
UROBILINOGEN, URINE: NORMAL
WBC # BLD: 17.8 K/UL (ref 3.5–11.3)
WBC # BLD: ABNORMAL 10*3/UL
WBC UA: ABNORMAL /HPF (ref 0–4)
YEAST: ABNORMAL

## 2019-12-31 PROCEDURE — 6360000002 HC RX W HCPCS

## 2019-12-31 PROCEDURE — 85610 PROTHROMBIN TIME: CPT

## 2019-12-31 PROCEDURE — 36415 COLL VENOUS BLD VENIPUNCTURE: CPT

## 2019-12-31 PROCEDURE — 93005 ELECTROCARDIOGRAM TRACING: CPT | Performed by: EMERGENCY MEDICINE

## 2019-12-31 PROCEDURE — 96375 TX/PRO/DX INJ NEW DRUG ADDON: CPT

## 2019-12-31 PROCEDURE — 85025 COMPLETE CBC W/AUTO DIFF WBC: CPT

## 2019-12-31 PROCEDURE — 31500 INSERT EMERGENCY AIRWAY: CPT

## 2019-12-31 PROCEDURE — 80307 DRUG TEST PRSMV CHEM ANLYZR: CPT

## 2019-12-31 PROCEDURE — 2500000003 HC RX 250 WO HCPCS: Performed by: EMERGENCY MEDICINE

## 2019-12-31 PROCEDURE — 71045 X-RAY EXAM CHEST 1 VIEW: CPT

## 2019-12-31 PROCEDURE — 87040 BLOOD CULTURE FOR BACTERIA: CPT

## 2019-12-31 PROCEDURE — 6370000000 HC RX 637 (ALT 250 FOR IP): Performed by: EMERGENCY MEDICINE

## 2019-12-31 PROCEDURE — 2580000003 HC RX 258: Performed by: EMERGENCY MEDICINE

## 2019-12-31 PROCEDURE — G0480 DRUG TEST DEF 1-7 CLASSES: HCPCS

## 2019-12-31 PROCEDURE — 94640 AIRWAY INHALATION TREATMENT: CPT

## 2019-12-31 PROCEDURE — 87086 URINE CULTURE/COLONY COUNT: CPT

## 2019-12-31 PROCEDURE — 83605 ASSAY OF LACTIC ACID: CPT

## 2019-12-31 PROCEDURE — 6360000002 HC RX W HCPCS: Performed by: EMERGENCY MEDICINE

## 2019-12-31 PROCEDURE — 96374 THER/PROPH/DIAG INJ IV PUSH: CPT

## 2019-12-31 PROCEDURE — 80053 COMPREHEN METABOLIC PANEL: CPT

## 2019-12-31 PROCEDURE — 83874 ASSAY OF MYOGLOBIN: CPT

## 2019-12-31 PROCEDURE — 81001 URINALYSIS AUTO W/SCOPE: CPT

## 2019-12-31 PROCEDURE — 80306 DRUG TEST PRSMV INSTRMNT: CPT

## 2019-12-31 PROCEDURE — 99285 EMERGENCY DEPT VISIT HI MDM: CPT

## 2019-12-31 PROCEDURE — 84484 ASSAY OF TROPONIN QUANT: CPT

## 2019-12-31 RX ORDER — VECURONIUM BROMIDE 1 MG/ML
10 INJECTION, POWDER, LYOPHILIZED, FOR SOLUTION INTRAVENOUS ONCE
Status: COMPLETED | OUTPATIENT
Start: 2019-12-31 | End: 2019-12-31

## 2019-12-31 RX ORDER — NALOXONE HYDROCHLORIDE 1 MG/ML
INJECTION INTRAMUSCULAR; INTRAVENOUS; SUBCUTANEOUS
Status: COMPLETED
Start: 2019-12-31 | End: 2019-12-31

## 2019-12-31 RX ORDER — MIDAZOLAM HYDROCHLORIDE 1 MG/ML
2 INJECTION INTRAMUSCULAR; INTRAVENOUS ONCE
Status: COMPLETED | OUTPATIENT
Start: 2019-12-31 | End: 2019-12-31

## 2019-12-31 RX ORDER — PROPOFOL 10 MG/ML
10 INJECTION, EMULSION INTRAVENOUS
Status: DISCONTINUED | OUTPATIENT
Start: 2019-12-31 | End: 2019-12-31 | Stop reason: HOSPADM

## 2019-12-31 RX ORDER — PROPOFOL 10 MG/ML
INJECTION, EMULSION INTRAVENOUS
Status: DISCONTINUED
Start: 2019-12-31 | End: 2019-12-31 | Stop reason: HOSPADM

## 2019-12-31 RX ORDER — ETOMIDATE 2 MG/ML
20 INJECTION INTRAVENOUS ONCE
Status: DISCONTINUED | OUTPATIENT
Start: 2019-12-31 | End: 2019-12-31 | Stop reason: HOSPADM

## 2019-12-31 RX ORDER — SUCCINYLCHOLINE CHLORIDE 20 MG/ML
200 INJECTION INTRAMUSCULAR; INTRAVENOUS ONCE
Status: DISCONTINUED | OUTPATIENT
Start: 2019-12-31 | End: 2019-12-31 | Stop reason: HOSPADM

## 2019-12-31 RX ORDER — IPRATROPIUM BROMIDE AND ALBUTEROL SULFATE 2.5; .5 MG/3ML; MG/3ML
1 SOLUTION RESPIRATORY (INHALATION) ONCE
Status: COMPLETED | OUTPATIENT
Start: 2019-12-31 | End: 2019-12-31

## 2019-12-31 RX ADMIN — SUCCINYLCHOLINE CHLORIDE 200 MG: 20 INJECTION, SOLUTION INTRAMUSCULAR; INTRAVENOUS at 17:46

## 2019-12-31 RX ADMIN — VECURONIUM BROMIDE 10 MG: 1 INJECTION, POWDER, LYOPHILIZED, FOR SOLUTION INTRAVENOUS at 18:14

## 2019-12-31 RX ADMIN — NALOXONE HYDROCHLORIDE 2 MG: 1 INJECTION PARENTERAL at 16:42

## 2019-12-31 RX ADMIN — MIDAZOLAM HYDROCHLORIDE 2 MG: 2 INJECTION, SOLUTION INTRAMUSCULAR; INTRAVENOUS at 18:16

## 2019-12-31 RX ADMIN — PIPERACILLIN AND TAZOBACTAM 3.38 G: 3; .375 INJECTION, POWDER, FOR SOLUTION INTRAVENOUS at 18:37

## 2019-12-31 RX ADMIN — NALOXONE HYDROCHLORIDE 2 MG: 1 INJECTION PARENTERAL at 17:22

## 2019-12-31 RX ADMIN — IPRATROPIUM BROMIDE AND ALBUTEROL SULFATE 1 AMPULE: .5; 2.5 SOLUTION RESPIRATORY (INHALATION) at 18:44

## 2019-12-31 RX ADMIN — ETOMIDATE 20 MG: 2 INJECTION, SOLUTION INTRAVENOUS at 17:42

## 2019-12-31 NOTE — ED NOTES
200mg succinylcholine given per iv  Respirations given per bag mask valve     Taylor Crews RN  12/31/19 [id number] Cirilo Stapleton RN  12/31/19 [id number]

## 2019-12-31 NOTE — ED PROVIDER NOTES
capsule TAKE 1 CAPSULE BY MOUTH 2 TIMES A DAY, Disp-60 capsule, R-5Normal      furosemide (LASIX) 20 MG tablet Take 1 tablet by mouth 2 times daily, Disp-180 tablet, R-1Normal      gabapentin (NEURONTIN) 300 MG capsule Take 2 capsules by mouth 3 times daily for 30 days. , Disp-180 capsule, R-3Normal      eszopiclone (LUNESTA) 3 MG TABS Take 1 tablet by mouth nightly for 90 days. , Disp-90 tablet, R-0Normal      diclofenac sodium 1 % GEL Apply 4 g topically 4 times daily as needed for Pain, Topical, 4 TIMES DAILY PRN Starting Mon 9/30/2019, Disp-500 g, R-3, Phone In      meloxicam (MOBIC) 7.5 MG tablet Take 7.5 mg by mouth dailyHistorical Med      ondansetron (ZOFRAN ODT) 4 MG disintegrating tablet Take 1 tablet by mouth every 8 hours as needed for Nausea or Vomiting, Disp-30 tablet, R-2Normal      lidocaine (LIDODERM) 5 % Place 1 patch onto the skin daily 12 hours on, 12 hours off., Disp-30 patch, R-5Normal      Compression Stockings MISC Starting Tue 7/2/2019, Disp-6 each, R-0, PrintNeeds 20-30mmHg      clindamycin (CLINDAGEL) 1 % gel Apply topically 2 times daily Apply topically 2 times daily. , Topical, 2 TIMES DAILY Starting Mon 6/17/2019, Disp-60 g, R-3, Normal      calcium carbonate (OSCAL) 500 MG TABS tablet Take 500 mg by mouth dailyHistorical Med      montelukast (SINGULAIR) 10 MG tablet TAKE 1 TABLET BY MOUTH DAILY, Disp-90 tablet, R-3Normal      benzonatate (TESSALON) 100 MG capsule TAKE 1 CAPSULE 3 TIMES A DAY, Disp-30 capsule, R-5Normal      metFORMIN (GLUCOPHAGE-XR) 500 MG extended release tablet Take 2 tablets by mouth daily (with breakfast), Disp-60 tablet, R-5Normal      buPROPion (WELLBUTRIN) 75 MG tablet Take 1 tablet by mouth 2 times daily, Disp-60 tablet, R-3Normal      sucralfate (CARAFATE) 1 GM tablet TAKE ONE TABLET BY MOUTH FOUR TIMES A DAY, Disp-360 tablet, R-3Normal      Loratadine 10 MG CAPS Take by mouth      Cholecalciferol (VITAMIN D) 2000 UNITS CAPS capsule Take 2,000 Units by mouth 2 present. Pulmonary:      Effort: Pulmonary effort is normal.      Breath sounds: Normal breath sounds. Abdominal:      General: Bowel sounds are normal.      Palpations: Abdomen is soft. Musculoskeletal:         General: No tenderness. Comments: Patient mottled and cool extremities her lower extremity she is got what appears to be pressure sores on her anterior shin and posterior calf consistent with her legs being crossed   Skin:     General: Skin is warm. Neurological:      Mental Status: She is alert. She is disoriented.       Comments: Patient after Narcan was oriented to self able to move all 4 extremities to command speech was thick but appropriate, there were no focal deficits seen   Psychiatric:         Behavior: Behavior normal.              DIFFERENTIAL DIAGNOSIS/ MDM:     Septic shock, aspiration pneumonia, narcotic overdose, rhabdomyolysis,    DIAGNOSTIC RESULTS     EKG: All EKG's are interpreted by the Emergency Department Physician who either signs or Co-signs this chart in the absence of a cardiologist.  Sinus tach rate of 139 bpm large amount of artifact TX interval is 104 ms QRS duration 76 ms QT corrected 544 ms axis is 4 there is no acute ST or T wave changes seen      RADIOLOGY:   I directly visualized the following  images and reviewed the radiologist interpretations:          ED BEDSIDE ULTRASOUND:       LABS:  Labs Reviewed   CBC WITH AUTO DIFFERENTIAL - Abnormal; Notable for the following components:       Result Value    WBC 17.8 (*)     RBC 5.53 (*)     Hemoglobin 15.5 (*)     Hematocrit 51.2 (*)     NRBC Automated 0.1 (*)     Lymphocytes 8 (*)     Eosinophils % 0 (*)     Immature Granulocytes 3 (*)     Bands 19 (*)     Segs Absolute 11.76 (*)     Absolute Immature Granulocyte 0.53 (*)     All other components within normal limits   COMPREHENSIVE METABOLIC PANEL - Abnormal; Notable for the following components:    Glucose 200 (*)     BUN 42 (*)     CREATININE 2.86 (*)

## 2020-01-01 LAB
ACETAMINOPHEN LEVEL: <5 UG/ML (ref 10–30)
ETHANOL PERCENT: ABNORMAL %
ETHANOL: <10 MG/DL
SALICYLATE LEVEL: <1 MG/DL (ref 3–10)
TOXIC TRICYCLIC SC,BLOOD: NEGATIVE

## 2020-01-02 LAB
CULTURE: NO GROWTH
EKG ATRIAL RATE: 139 BPM
EKG P-R INTERVAL: 104 MS
EKG Q-T INTERVAL: 358 MS
EKG QRS DURATION: 76 MS
EKG QTC CALCULATION (BAZETT): 544 MS
EKG R AXIS: 4 DEGREES
EKG T AXIS: 62 DEGREES
EKG VENTRICULAR RATE: 139 BPM
Lab: NORMAL
SPECIMEN DESCRIPTION: NORMAL

## 2020-01-07 LAB
CULTURE: NORMAL
CULTURE: NORMAL
Lab: NORMAL
Lab: NORMAL
SPECIMEN DESCRIPTION: NORMAL
SPECIMEN DESCRIPTION: NORMAL

## 2020-01-24 ENCOUNTER — TELEPHONE (OUTPATIENT)
Dept: FAMILY MEDICINE CLINIC | Age: 55
End: 2020-01-24

## 2020-02-05 ENCOUNTER — TELEPHONE (OUTPATIENT)
Dept: FAMILY MEDICINE CLINIC | Age: 55
End: 2020-02-05

## 2020-02-06 ENCOUNTER — TELEPHONE (OUTPATIENT)
Dept: FAMILY MEDICINE CLINIC | Age: 55
End: 2020-02-06

## 2020-02-06 NOTE — TELEPHONE ENCOUNTER
Physician from [address] [place] [address] pain [facility] stated we needed to send more information instead of a 2 page external referral. I explained its [facility] and that I can send office notes and demographics. She stated ok but Tobi Curtis needs to sign it.  I placed paper work on your desk

## 2020-02-06 NOTE — TELEPHONE ENCOUNTER
Belinda from [facility] called states the referral that was faxed on 2/5/2020 has not been received. Asked that it be faxed again.   Fax number is correct in previous TE.

## 2020-02-07 ENCOUNTER — TELEPHONE (OUTPATIENT)
Dept: FAMILY MEDICINE CLINIC | Age: 55
End: 2020-02-07

## 2020-02-11 ENCOUNTER — OFFICE VISIT (OUTPATIENT)
Dept: FAMILY MEDICINE CLINIC | Age: 55
End: 2020-02-11
Payer: COMMERCIAL

## 2020-02-11 ENCOUNTER — HOSPITAL ENCOUNTER (OUTPATIENT)
Dept: GENERAL RADIOLOGY | Age: 55
Discharge: HOME | End: 2020-02-13
Payer: COMMERCIAL

## 2020-02-11 VITALS
SYSTOLIC BLOOD PRESSURE: 118 MMHG | OXYGEN SATURATION: 98 % | DIASTOLIC BLOOD PRESSURE: 84 MMHG | RESPIRATION RATE: 18 BRPM | HEART RATE: 84 BPM

## 2020-02-11 PROCEDURE — 73521 X-RAY EXAM HIPS BI 2 VIEWS: CPT

## 2020-02-11 PROCEDURE — 72100 X-RAY EXAM L-S SPINE 2/3 VWS: CPT

## 2020-02-11 PROCEDURE — 99496 TRANSJ CARE MGMT HIGH F2F 7D: CPT | Performed by: FAMILY MEDICINE

## 2020-02-11 PROCEDURE — G8431 POS CLIN DEPRES SCRN F/U DOC: HCPCS | Performed by: FAMILY MEDICINE

## 2020-02-11 PROCEDURE — G0444 DEPRESSION SCREEN ANNUAL: HCPCS | Performed by: FAMILY MEDICINE

## 2020-02-11 PROCEDURE — 1111F DSCHRG MED/CURRENT MED MERGE: CPT | Performed by: FAMILY MEDICINE

## 2020-02-11 RX ORDER — HYDROCODONE BITARTRATE AND ACETAMINOPHEN 5; 325 MG/1; MG/1
1 TABLET ORAL EVERY 8 HOURS PRN
COMMUNITY
End: 2020-02-11 | Stop reason: SDUPTHER

## 2020-02-11 RX ORDER — METOPROLOL TARTRATE 50 MG/1
TABLET, FILM COATED ORAL
COMMUNITY
End: 2020-03-02 | Stop reason: SDUPTHER

## 2020-02-11 RX ORDER — MECOBALAMIN 5000 MCG
10 TABLET,DISINTEGRATING ORAL NIGHTLY
COMMUNITY

## 2020-02-11 RX ORDER — HYDROCODONE BITARTRATE AND ACETAMINOPHEN 5; 325 MG/1; MG/1
1 TABLET ORAL EVERY 6 HOURS PRN
Qty: 120 TABLET | Refills: 0 | Status: SHIPPED | OUTPATIENT
Start: 2020-02-11 | End: 2020-03-05

## 2020-02-11 ASSESSMENT — PATIENT HEALTH QUESTIONNAIRE - PHQ9
SUM OF ALL RESPONSES TO PHQ QUESTIONS 1-9: 21
7. TROUBLE CONCENTRATING ON THINGS, SUCH AS READING THE NEWSPAPER OR WATCHING TELEVISION: 2
3. TROUBLE FALLING OR STAYING ASLEEP: 3
SUM OF ALL RESPONSES TO PHQ QUESTIONS 1-9: 21
10. IF YOU CHECKED OFF ANY PROBLEMS, HOW DIFFICULT HAVE THESE PROBLEMS MADE IT FOR YOU TO DO YOUR WORK, TAKE CARE OF THINGS AT HOME, OR GET ALONG WITH OTHER PEOPLE: 3
8. MOVING OR SPEAKING SO SLOWLY THAT OTHER PEOPLE COULD HAVE NOTICED. OR THE OPPOSITE, BEING SO FIGETY OR RESTLESS THAT YOU HAVE BEEN MOVING AROUND A LOT MORE THAN USUAL: 3
5. POOR APPETITE OR OVEREATING: 2
SUM OF ALL RESPONSES TO PHQ9 QUESTIONS 1 & 2: 4
6. FEELING BAD ABOUT YOURSELF - OR THAT YOU ARE A FAILURE OR HAVE LET YOURSELF OR YOUR FAMILY DOWN: 3
9. THOUGHTS THAT YOU WOULD BE BETTER OFF DEAD, OR OF HURTING YOURSELF: 1
1. LITTLE INTEREST OR PLEASURE IN DOING THINGS: 1
2. FEELING DOWN, DEPRESSED OR HOPELESS: 3
4. FEELING TIRED OR HAVING LITTLE ENERGY: 3

## 2020-02-11 NOTE — PROGRESS NOTES
Post-Discharge Transitional Care Management Services      Sienna Cardona   YOB: 1965    Date of Visit:  2/11/2020  Date of discharge: 2/6/2020  Date of admission: 12/31/2019    Allergies   Allergen Reactions    Avelox [Moxifloxacin]      Rash and edema    Bactrim [Sulfamethoxazole-Trimethoprim]      hives    Cefuroxime Axetil Nausea Only    Codeine      Nausea and rash    Cleatus Glory [Tofacitinib] Diarrhea     vomit     Outpatient Medications Marked as Taking for the 2/11/20 encounter (Office Visit) with Chaitanya Iverson MD   Medication Sig Dispense Refill    sertraline (ZOLOFT) 50 MG tablet Take 50 mg by mouth daily      metoprolol tartrate (LOPRESSOR) 50 MG tablet Take half tablet twice a day      insulin glargine (LANTUS SOLOSTAR) 100 UNIT/ML injection Inject 16 Units into the skin daily      melatonin 5 MG TBDP disintegrating tablet Take 10 mg by mouth nightly      diphenhydrAMINE HCl (BENADRYL ALLERGY PO) Take 25 mg by mouth nightly      blood glucose test strips (EXACTECH TEST) strip 1 strip by Other route 4 times daily      HYDROcodone-acetaminophen (NORCO) 5-325 MG per tablet Take 1 tablet by mouth every 6 hours as needed for Pain for up to 30 days. 120 tablet 0    Lift Chair MISC by Does not apply route 1 each 0    esomeprazole (NEXIUM) 40 MG delayed release capsule TAKE 1 CAPSULE BY MOUTH 2 TIMES A DAY 60 capsule 5    ondansetron (ZOFRAN ODT) 4 MG disintegrating tablet Take 1 tablet by mouth every 8 hours as needed for Nausea or Vomiting 30 tablet 2    lidocaine (LIDODERM) 5 % Place 1 patch onto the skin daily 12 hours on, 12 hours off.  30 patch 5    Compression Stockings MISC by Does not apply route Needs 20-30mmHg 6 each 0    calcium carbonate (OSCAL) 500 MG TABS tablet Take 500 mg by mouth daily      montelukast (SINGULAIR) 10 MG tablet TAKE 1 TABLET BY MOUTH DAILY 90 tablet 3    Loratadine 10 MG CAPS Take by mouth      Cholecalciferol (VITAMIN D) 2000 UNITS CAPS capsule Take 2,000 Units by mouth 2 times daily      predniSONE (DELTASONE) 5 MG tablet Take 5 mg by mouth daily       hydroxychloroquine (PLAQUENIL) 200 MG tablet Take by mouth One every morning and every night           Vitals:    02/11/20 1103   BP: 118/84   Site: Left Upper Arm   Position: Sitting   Cuff Size: Medium Adult   Pulse: 84   Resp: 18   SpO2: 98%     There is no height or weight on file to calculate BMI. Wt Readings from Last 3 Encounters:   12/31/19 200 lb (90.7 kg)   12/30/19 192 lb (87.1 kg)   12/26/19 190 lb 3.2 oz (86.3 kg)     BP Readings from Last 3 Encounters:   02/11/20 118/84   12/31/19 82/60   12/30/19 126/70        Patient was admitted to [facility] in [facility] from 12/31/19 to 2/6/2020 for pneumonia and acute respiratory failure. Inpatient course: Discharge summary reviewed- see chart. She has been home since 2/6/2020 and things are going okay. She is still feeling really weak and she requires a lot of assistance with position changes. She is wearing her gait belt in the room. She is doing PT at home and OT and she feels like it is going well and helping her get a little stronger. She is still having problems getting her legs up into bed so she needs a leg pull to help. She has a shower seat, arms for the commode, a wheel chair and a walker at home. She is struggling to get off of the couch or out of a chair because she is too weak to push herself up so she feels that a lift chair would help her. Her biggest issue at this time is that her back is really bothering her. Her pain meds were drastically reduced while she was in the hospital and she had a fall while in rehab that exacerbated her pain. She has not had any falls at home. She is struggling to get comfortable because of the pain. She is planning to go to a different pain management office so she wants a referral to [facility].  She also feels like she needs her pain medication increased to every 6 hours because her pain is not controlled currently with her taking her medication every 8 hours. She also has a wound on her legs from a pressure ulcer from her crossing her legs while she was in the ICU. She went to wound care once at [place] and she is supposed to follow up there again next week, but she would like to be seen here in [place] so I will put in a referral to wound care here. Currently her home health nurses are doing her dressing changes. Her breathing has gotten much better since she went to the hospital initially. She is not short of breath and she is not having any issues with cough. She has not smoked since she was in the hospital.     Current status: improving    Review of Systems:  A comprehensive review of systems was negative except for what was noted in the HPI. Physical Exam:  General Appearance: alert and oriented to person, place and time, well-developed and well-nourished, in no acute distress  Skin: warm and dry, no rash or erythema. There are two pressure ulcers on her lower extremities. The ulcer on the left leg is on the posterior aspect just proximal to the ankle. It is about the size of a quarter with granulation tissue present. There is an ulcer on the right leg on the anterior aspect just proximal to the ankle. It appears to be three small dime sized ulcers that are convalescing. Currently the right leg ulcer is covered with a seaweed dressing. Her lower extremities are also a dusky color because when she sits up with her legs out she is cutting off her circulation.   Neck: neck supple and non tender without mass, no thyromegaly or thyroid nodules, no cervical lymphadenopathy   Pulmonary/Chest: clear to auscultation bilaterally- no wheezes, rales or rhonchi, normal air movement, no respiratory distress  Cardiovascular: normal rate, normal S1 and S2, no gallops and intact distal pulses  Extremities: no cyanosis, no clubbing and no edema  Musculoskeletal: paraspinal muscle tenderness present- bilateral thoracic, lumbar  Neurologic: speech normal and gait abnormal- very unstable. Requires a gait belt    Initial post-discharge communication occurred between nurse and caregiver- spouse on 2/7/2020- see documentation in chart: telephone encounter. Assessment/Plan:  Wendi Dinero was seen today for follow-up from hospital, tremors and back pain. Diagnoses and all orders for this visit:    Pneumonia due to infectious organism, unspecified laterality, unspecified part of lung  Resolved; she is feeling much better and her lungs are clear. She was on ECMO while in the hospital so I told her that she needs to have a low threshold for returning if she feels sob. -     [place] DISCHARGE MEDS RECONCILED W/ CURRENT OUTPATIENT MED LIST  -     Lift Chair [address] [facility]; by Does not apply route    Decubitus ulcer of lower extremity, unspecified ulcer stage  Stable; she would rather not drive to [facility] so I put in a referral our wound care clinic.   -     External Referral To [address]; by Does not apply route    Chronic midline low back pain, unspecified whether sciatica present  Worsening; she is really struggling with the pain. I increased her norco to 4 x a day and I referred her to pain management for further recommendations. I also ordered an xray to make sure that she did not have a fracture from her fall in the nursing home. I also ordered her a lift chair to help with her mobility since she is too weak to push herself up from the couch.    -     Cancel: XR LUMBAR AP LAT L5S1 [place]; Future  -     Lift Chair MISC; by Does not apply route  -     XR LUMBAR SPINE (2-3 VIEWS); Future   -     HYDROcodone-acetaminophen (NORCO) 5-325 MG per tablet; Take 1 tablet by mouth every 6 hours as needed for Pain for up to 30 days.     Hip pain, bilateral  Worsening; she is concerned about a new pelvic fracture after her fall while in rehab so I ordered an xray to evaluate. -     XR HIP BILATERAL W AP PELVIS (2 VIEWS); Future  -     Lift Chair MISC; by Does not apply route    Postlaminectomy syndrome  -     Lift Chair MISC; by Does not apply route    Rheumatoid arthritis, involving unspecified site, unspecified rheumatoid factor presence ([facility] [address]  -     Lift Chair MISC; by Does not apply route    Positive depression screening  -     Positive Screen for Clinical Depression with a Documented Follow-up Plan   On the basis of positive PHQ-9 screening (PHQ-9 Total Score: 21), the following plan was implemented: false positive screen suspected- will re-evaluate at next visit. Patient will follow-up in 1 month(s) with PCP. Diagnostic test results reviewed: inpatient labs and post-discharge labs    Patient risk of morbidity and mortality: high    Medical Decision Making: high complexity     Return in about 1 month (around 3/11/2020) for Depression follow up.     Chaitanya Iverson MD  2/11/2020  6:03 PM

## 2020-02-13 ENCOUNTER — OFFICE VISIT (OUTPATIENT)
Dept: WOUND CARE | Age: 55
End: 2020-02-13
Payer: COMMERCIAL

## 2020-02-13 ENCOUNTER — HOSPITAL ENCOUNTER (OUTPATIENT)
Dept: LAB | Age: 55
Discharge: HOME | End: 2020-02-13
Payer: COMMERCIAL

## 2020-02-13 VITALS — SYSTOLIC BLOOD PRESSURE: 132 MMHG | HEART RATE: 88 BPM | TEMPERATURE: 97.7 F | DIASTOLIC BLOOD PRESSURE: 80 MMHG

## 2020-02-13 PROBLEM — L89.893 PRESSURE ULCER OF RIGHT LEG, STAGE 3 (HCC): Status: ACTIVE | Noted: 2020-02-13

## 2020-02-13 PROBLEM — E46 HYPOALBUMINEMIA DUE TO PROTEIN-CALORIE MALNUTRITION (HCC): Status: ACTIVE | Noted: 2020-02-13

## 2020-02-13 PROBLEM — L92.9 HYPERGRANULATION: Status: ACTIVE | Noted: 2020-02-13

## 2020-02-13 PROBLEM — L89.893 PRESSURE ULCER OF LEFT LEG, STAGE 3 (HCC): Status: ACTIVE | Noted: 2020-02-13

## 2020-02-13 PROBLEM — E88.09 HYPOALBUMINEMIA DUE TO PROTEIN-CALORIE MALNUTRITION (HCC): Status: ACTIVE | Noted: 2020-02-13

## 2020-02-13 LAB
ALBUMIN SERPL-MCNC: 3.4 G/DL (ref 3.5–5.2)
PREALBUMIN: 36.4 MG/DL (ref 20–40)

## 2020-02-13 PROCEDURE — 87077 CULTURE AEROBIC IDENTIFY: CPT

## 2020-02-13 PROCEDURE — 87205 SMEAR GRAM STAIN: CPT

## 2020-02-13 PROCEDURE — 87070 CULTURE OTHR SPECIMN AEROBIC: CPT

## 2020-02-13 PROCEDURE — 87075 CULTR BACTERIA EXCEPT BLOOD: CPT

## 2020-02-13 PROCEDURE — 87186 SC STD MICRODIL/AGAR DIL: CPT

## 2020-02-13 PROCEDURE — 17250 CHEM CAUT OF GRANLTJ TISSUE: CPT | Performed by: SURGERY

## 2020-02-13 PROCEDURE — 82040 ASSAY OF SERUM ALBUMIN: CPT

## 2020-02-13 PROCEDURE — 84134 ASSAY OF PREALBUMIN: CPT

## 2020-02-13 PROCEDURE — 11042 DBRDMT SUBQ TIS 1ST 20SQCM/<: CPT | Performed by: SURGERY

## 2020-02-13 PROCEDURE — 36415 COLL VENOUS BLD VENIPUNCTURE: CPT

## 2020-02-13 RX ORDER — LANCETS 30 GAUGE
1 EACH MISCELLANEOUS
COMMUNITY
Start: 2020-02-03 | End: 2020-02-13 | Stop reason: CLARIF

## 2020-02-13 RX ORDER — PEN NEEDLE, DIABETIC 32GX 5/32"
NEEDLE, DISPOSABLE MISCELLANEOUS
COMMUNITY
Start: 2020-02-03

## 2020-02-13 RX ORDER — SACCHAROMYCES BOULARDII 250 MG
250 CAPSULE ORAL
COMMUNITY
Start: 2020-02-06 | End: 2020-03-07

## 2020-02-13 RX ORDER — LANCING DEVICE/LANCETS
KIT MISCELLANEOUS
COMMUNITY
Start: 2020-02-03

## 2020-02-13 NOTE — PROGRESS NOTES
high protein diet  - Check albumin and pre-albumin. 2) Stop Santyl  3) Brittney or similar dressing every other days  4) Avoid pressure to sacrum  5) Continue foam on inguinal wound  6) No smoking  7) Follow up with PCP to make sure diabetes is optimally managed  8) Follow up with me in 1 week.

## 2020-02-14 ENCOUNTER — TELEPHONE (OUTPATIENT)
Dept: WOUND CARE | Age: 55
End: 2020-02-14

## 2020-02-14 NOTE — TELEPHONE ENCOUNTER
Faxed AVS with Wound Care instructions and Progress Note to [facility].

## 2020-02-17 LAB
CULTURE: ABNORMAL
CULTURE: ABNORMAL
DIRECT EXAM: ABNORMAL
DIRECT EXAM: ABNORMAL
Lab: ABNORMAL
SPECIMEN DESCRIPTION: ABNORMAL

## 2020-02-18 ENCOUNTER — TELEPHONE (OUTPATIENT)
Dept: FAMILY MEDICINE CLINIC | Age: 55
End: 2020-02-18

## 2020-02-18 NOTE — TELEPHONE ENCOUNTER
Emily Tam with [facility] calling stating pt's blood sugars have been low in the AM in the 70's and pt is nauseous, questions we could decrease pt's lantus, [facility] [phone]

## 2020-02-20 ENCOUNTER — OFFICE VISIT (OUTPATIENT)
Dept: WOUND CARE | Age: 55
End: 2020-02-20
Payer: COMMERCIAL

## 2020-02-20 VITALS
DIASTOLIC BLOOD PRESSURE: 80 MMHG | OXYGEN SATURATION: 97 % | TEMPERATURE: 98.6 F | SYSTOLIC BLOOD PRESSURE: 124 MMHG | HEART RATE: 101 BPM

## 2020-02-20 PROCEDURE — 11042 DBRDMT SUBQ TIS 1ST 20SQCM/<: CPT | Performed by: SURGERY

## 2020-02-21 ENCOUNTER — TELEPHONE (OUTPATIENT)
Dept: WOUND CARE | Age: 55
End: 2020-02-21

## 2020-02-27 ENCOUNTER — OFFICE VISIT (OUTPATIENT)
Dept: WOUND CARE | Age: 55
End: 2020-02-27
Payer: COMMERCIAL

## 2020-02-27 VITALS
TEMPERATURE: 98 F | HEART RATE: 98 BPM | RESPIRATION RATE: 16 BRPM | SYSTOLIC BLOOD PRESSURE: 126 MMHG | DIASTOLIC BLOOD PRESSURE: 70 MMHG

## 2020-02-27 PROCEDURE — G8427 DOCREV CUR MEDS BY ELIG CLIN: HCPCS | Performed by: SURGERY

## 2020-02-27 PROCEDURE — 3017F COLORECTAL CA SCREEN DOC REV: CPT | Performed by: SURGERY

## 2020-02-27 PROCEDURE — 1036F TOBACCO NON-USER: CPT | Performed by: SURGERY

## 2020-02-27 PROCEDURE — G8417 CALC BMI ABV UP PARAM F/U: HCPCS | Performed by: SURGERY

## 2020-02-27 PROCEDURE — G8482 FLU IMMUNIZE ORDER/ADMIN: HCPCS | Performed by: SURGERY

## 2020-02-27 PROCEDURE — 99212 OFFICE O/P EST SF 10 MIN: CPT | Performed by: SURGERY

## 2020-02-28 ENCOUNTER — TELEPHONE (OUTPATIENT)
Dept: WOUND CARE | Age: 55
End: 2020-02-28

## 2020-03-02 ENCOUNTER — TELEPHONE (OUTPATIENT)
Dept: PAIN MANAGEMENT | Age: 55
End: 2020-03-02

## 2020-03-02 ENCOUNTER — TELEPHONE (OUTPATIENT)
Dept: FAMILY MEDICINE CLINIC | Age: 55
End: 2020-03-02

## 2020-03-02 RX ORDER — PREDNISONE 1 MG/1
5 TABLET ORAL DAILY
Qty: 90 TABLET | Refills: 1 | OUTPATIENT
Start: 2020-03-02

## 2020-03-02 RX ORDER — METOPROLOL TARTRATE 50 MG/1
25 TABLET, FILM COATED ORAL 2 TIMES DAILY
Qty: 30 TABLET | Refills: 2 | Status: SHIPPED | OUTPATIENT
Start: 2020-03-02 | End: 2020-05-22

## 2020-03-02 RX ORDER — HYDROXYCHLOROQUINE SULFATE 200 MG/1
200 TABLET, FILM COATED ORAL 2 TIMES DAILY
Qty: 180 TABLET | Refills: 2 | OUTPATIENT
Start: 2020-03-02

## 2020-03-02 NOTE — TELEPHONE ENCOUNTER
Spoke to Cesar Alvarez and let him know that we would not be able to fill the script and she would need to contact her pain management dr if she would like to continue and wants increases. He stated he understood and would contact them.

## 2020-03-02 NOTE — TELEPHONE ENCOUNTER
No I don't feel comfortable making that decision. She will need to talk to her pain management doctor about that.

## 2020-03-05 ENCOUNTER — OFFICE VISIT (OUTPATIENT)
Dept: WOUND CARE | Age: 55
End: 2020-03-05
Payer: COMMERCIAL

## 2020-03-05 ENCOUNTER — OFFICE VISIT (OUTPATIENT)
Dept: FAMILY MEDICINE CLINIC | Age: 55
End: 2020-03-05
Payer: COMMERCIAL

## 2020-03-05 ENCOUNTER — TELEPHONE (OUTPATIENT)
Dept: FAMILY MEDICINE CLINIC | Age: 55
End: 2020-03-05
Payer: COMMERCIAL

## 2020-03-05 VITALS
DIASTOLIC BLOOD PRESSURE: 80 MMHG | WEIGHT: 177.03 LBS | TEMPERATURE: 98.7 F | OXYGEN SATURATION: 98 % | HEART RATE: 98 BPM | HEIGHT: 65 IN | SYSTOLIC BLOOD PRESSURE: 120 MMHG | BODY MASS INDEX: 29.49 KG/M2

## 2020-03-05 VITALS
DIASTOLIC BLOOD PRESSURE: 80 MMHG | WEIGHT: 177 LBS | HEART RATE: 98 BPM | SYSTOLIC BLOOD PRESSURE: 120 MMHG | HEIGHT: 65 IN | OXYGEN SATURATION: 98 % | BODY MASS INDEX: 29.49 KG/M2

## 2020-03-05 PROCEDURE — 3017F COLORECTAL CA SCREEN DOC REV: CPT | Performed by: FAMILY MEDICINE

## 2020-03-05 PROCEDURE — 99214 OFFICE O/P EST MOD 30 MIN: CPT | Performed by: FAMILY MEDICINE

## 2020-03-05 PROCEDURE — G8417 CALC BMI ABV UP PARAM F/U: HCPCS | Performed by: FAMILY MEDICINE

## 2020-03-05 PROCEDURE — 11042 DBRDMT SUBQ TIS 1ST 20SQCM/<: CPT | Performed by: SURGERY

## 2020-03-05 PROCEDURE — G0180 MD CERTIFICATION HHA PATIENT: HCPCS | Performed by: FAMILY MEDICINE

## 2020-03-05 PROCEDURE — 1036F TOBACCO NON-USER: CPT | Performed by: FAMILY MEDICINE

## 2020-03-05 PROCEDURE — G8482 FLU IMMUNIZE ORDER/ADMIN: HCPCS | Performed by: FAMILY MEDICINE

## 2020-03-05 PROCEDURE — G8427 DOCREV CUR MEDS BY ELIG CLIN: HCPCS | Performed by: FAMILY MEDICINE

## 2020-03-05 RX ORDER — ACETAMINOPHEN 325 MG/1
650 TABLET ORAL EVERY 6 HOURS PRN
COMMUNITY
Start: 2020-02-06 | End: 2020-03-07

## 2020-03-05 RX ORDER — SERTRALINE HYDROCHLORIDE 100 MG/1
100 TABLET, FILM COATED ORAL DAILY
Qty: 90 TABLET | Refills: 1 | Status: SHIPPED | OUTPATIENT
Start: 2020-03-05 | End: 2021-09-01

## 2020-03-05 RX ORDER — GABAPENTIN 300 MG/1
300 CAPSULE ORAL 3 TIMES DAILY
COMMUNITY
Start: 2020-03-02 | End: 2020-04-01

## 2020-03-05 RX ORDER — ESZOPICLONE 3 MG/1
3 TABLET, FILM COATED ORAL NIGHTLY
COMMUNITY
End: 2020-03-05

## 2020-03-05 RX ORDER — HYDROCODONE BITARTRATE AND ACETAMINOPHEN 10; 325 MG/1; MG/1
1 TABLET ORAL EVERY 6 HOURS PRN
Qty: 120 TABLET | Refills: 0 | Status: SHIPPED | OUTPATIENT
Start: 2020-03-05 | End: 2020-04-04

## 2020-03-05 RX ORDER — TRAZODONE HYDROCHLORIDE 100 MG/1
100 TABLET ORAL NIGHTLY
Qty: 90 TABLET | Refills: 1 | Status: SHIPPED | OUTPATIENT
Start: 2020-03-05

## 2020-03-05 ASSESSMENT — ENCOUNTER SYMPTOMS
CHEST TIGHTNESS: 0
COUGH: 0
SHORTNESS OF BREATH: 0
WHEEZING: 0
BACK PAIN: 1

## 2020-03-05 NOTE — PATIENT INSTRUCTIONS
Continue with same dressing to bilateral legs. Change dressing every other day. SIGNS OF INFECTION  - Redness, swelling, skin hot  - Wound bed turns black or stringy yellow  - Foul odor  - Increased drainage or pus  - Increased pain  - Fever greater than 100F    CALL YOUR DOCTOR OR SEEK MEDICAL ATTENTION IF SIGNS OF INFECTION. DO NOT WAIT UNTIL YOUR NEXT APPOINTMENT    Call the Wound Care Nurse with any other questions or concerns- [phone]. Follow up as scheduled with Dr. Rhina Joshi.

## 2020-03-05 NOTE — PROGRESS NOTES
[facility] [address]  [address] [address]  Dept: [phone]  Dept Fax: [phone]  Loc: [phone]    Han Mejia is a 62 y.o. female who presents today for her medical conditions/complaints as noted below. Han Mejia is c/o of   Chief Complaint   Patient presents with    Diabetes     6m f/u stopped lantus -  ranging  last 3 days since lantus has been upper 90's    Anxiety       HPI:     HPI Here today for a follow up of DM and anxiety. Hyperglycemia: improving; She stopped the insulin a few days ago and since then she has only had one hypoglycemic event and she has only had one reading in the 140s. She typically is in the 96 and 98 range. Anxiety: stable; She is miserable with her pain. She went to see a pain management doctor and she ordered an MRI and said follow up in 6 weeks and that was at [facility]. She also has an appointment at [facility] [address]. She did not get the MRI done yet. She is not happy with the pain clinic at [facility] because it is a complete clinic that involves the mental and physical aspects. She doesn't think that she needs the mental part. She is still very irritable and she is having many crying spells throughout the day. Her kids want her to go to a nursing home and she is very mad about that. She feels like her kids and her  are ganging up on her. She also was told that she needs to stop her lunesta if she wants to continue getting pain medication from pain management. I started her on trazodone instead.       Past Medical History:   Diagnosis Date    Anemia     Arthritis     rheumatoid    Asthma     DJD (degenerative joint disease)     GERD (gastroesophageal reflux disease)     History of bleeding ulcers     Rheumatoid arthritis(714.0)           Social History     Tobacco Use    Smoking status: Former Smoker     Packs/day: 0.50     Years: 25.00     Pack years: predniSONE (DELTASONE) 5 MG tablet Take 5 mg by mouth daily       hydroxychloroquine (PLAQUENIL) 200 MG tablet Take by mouth One every morning and every night      Elastic Bandages & Supports (MEDICAL COMPRESSION STOCKINGS) MISC 1 each by NOT APPLICABLE route daily bilateral      collagenase 250 UNIT/GM ointment Apply topically      melatonin 5 MG TBDP disintegrating tablet Take 10 mg by mouth nightly      diphenhydrAMINE HCl (BENADRYL ALLERGY PO) Take 25 mg by mouth nightly      Compression Stockings MISC by Does not apply route Needs 20-30mmHg 6 each 0     No current facility-administered medications for this visit. Allergies   Allergen Reactions    Avelox [Moxifloxacin]      Rash and edema    Bactrim [Sulfamethoxazole-Trimethoprim]      hives    Cefuroxime Axetil Nausea Only    Codeine      Nausea and rash    Sulfa Antibiotics Hives    Xeljanz [Tofacitinib] Diarrhea     vomit       Subjective:     Review of Systems   Constitutional: Positive for fatigue. Negative for activity change, appetite change, chills and fever. Eyes: Negative for visual disturbance. Respiratory: Negative for cough, chest tightness, shortness of breath and wheezing. Cardiovascular: Negative for chest pain, palpitations and leg swelling. Genitourinary: Negative for difficulty urinating. Musculoskeletal: Positive for arthralgias, back pain and gait problem. Neurological: Positive for weakness. Negative for dizziness, syncope, light-headedness and headaches. Psychiatric/Behavioral: Positive for dysphoric mood and sleep disturbance. The patient is nervous/anxious. Objective:      Physical Exam  Vitals signs and nursing note reviewed. Constitutional:       General: She is in acute distress. Appearance: She is well-developed. Comments: She is in a wheelchair   Eyes:      Conjunctiva/sclera: Conjunctivae normal.   Neck:      Musculoskeletal: Normal range of motion and neck supple. Thyroid: No thyromegaly. Cardiovascular:      Rate and Rhythm: Normal rate and regular rhythm. Heart sounds: Normal heart sounds. No murmur. Pulmonary:      Effort: Pulmonary effort is normal. No respiratory distress. Breath sounds: Normal breath sounds. No wheezing. Lymphadenopathy:      Cervical: No cervical adenopathy. Skin:     General: Skin is warm and dry. Findings: No erythema or rash. Neurological:      Mental Status: She is alert and oriented to person, place, and time. Psychiatric:         Attention and Perception: She is inattentive. Mood and Affect: Mood is depressed. Affect is angry. Speech: Speech normal.         Behavior: Behavior is agitated. Thought Content: Thought content normal.         Judgment: Judgment is impulsive. /80 (Site: Right Upper Arm, Position: Sitting, Cuff Size: Medium Adult)   Pulse 98   Ht 5' 5\" (1.651 m)   Wt 177 lb (80.3 kg)   LMP 04/12/2012   SpO2 98%   BMI 29.45 kg/m²     Assessment:       Diagnosis Orders   1. Chronic midline low back pain, unspecified whether sciatica present  Lift Chair MISC    HYDROcodone-acetaminophen (NORCO)  MG per tablet   2. Postlaminectomy syndrome  Lift Chair MISC    HYDROcodone-acetaminophen (NORCO)  MG per tablet   3. Hyperglycemia     4. SHARON (generalized anxiety disorder)     5. Decubitus ulcer of lower extremity, unspecified ulcer stage  Lift Chair MISC   6. Pneumonia due to infectious organism, unspecified laterality, unspecified part of lung  Lift Chair MISC   7. Hip pain, bilateral  Lift Chair MISC   8. Rheumatoid arthritis, involving unspecified site, unspecified rheumatoid factor presence [facility])  Lift Chair MISC             Plan:        Back pain: worsening; she is very frustrated that she is in so much pain.  I explained that she has to go to pain management, but since she is doing therapy and that is causing her pain to worsen I will treat with the 10/325mg of

## 2020-03-06 ENCOUNTER — TELEPHONE (OUTPATIENT)
Dept: FAMILY MEDICINE CLINIC | Age: 55
End: 2020-03-06

## 2020-03-06 NOTE — TELEPHONE ENCOUNTER
I figured she wasn't going to be happy about the decision to increase it. You can let them both know that I think increasing it would be the best thing for her, but I can't make her take the higher dose. If they picked up the higher dose script she can just break the tabs in half.

## 2020-03-06 NOTE — TELEPHONE ENCOUNTER
calling stating pt was seen yesterday and her zoloft was increased,  states he Rachel Slocumb it without wife being aware and pt is very upset, please call to discuss at above number.

## 2020-03-12 ENCOUNTER — OFFICE VISIT (OUTPATIENT)
Dept: WOUND CARE | Age: 55
End: 2020-03-12
Payer: COMMERCIAL

## 2020-03-12 ENCOUNTER — HOSPITAL ENCOUNTER (OUTPATIENT)
Dept: LAB | Age: 55
Discharge: HOME | End: 2020-03-12
Payer: COMMERCIAL

## 2020-03-12 VITALS
TEMPERATURE: 98.3 F | BODY MASS INDEX: 29.49 KG/M2 | SYSTOLIC BLOOD PRESSURE: 112 MMHG | WEIGHT: 177.03 LBS | HEIGHT: 65 IN | HEART RATE: 83 BPM | RESPIRATION RATE: 16 BRPM | DIASTOLIC BLOOD PRESSURE: 72 MMHG

## 2020-03-12 LAB
ABSOLUTE EOS #: 0.32 K/UL (ref 0–0.44)
ABSOLUTE IMMATURE GRANULOCYTE: 0.04 K/UL (ref 0–0.3)
ABSOLUTE LYMPH #: 1.87 K/UL (ref 1.1–3.7)
ABSOLUTE MONO #: 0.7 K/UL (ref 0.1–1.2)
ALBUMIN SERPL-MCNC: 3.3 G/DL (ref 3.5–5.2)
ALBUMIN/GLOBULIN RATIO: 0.9 (ref 1–2.5)
ALP BLD-CCNC: 78 U/L (ref 35–104)
ALT SERPL-CCNC: 10 U/L (ref 5–33)
ANION GAP SERPL CALCULATED.3IONS-SCNC: 16 MMOL/L (ref 9–17)
ANION GAP SERPL CALCULATED.3IONS-SCNC: 16 MMOL/L (ref 9–17)
AST SERPL-CCNC: 20 U/L
BASOPHILS # BLD: 1 % (ref 0–2)
BASOPHILS ABSOLUTE: 0.06 K/UL (ref 0–0.2)
BILIRUB SERPL-MCNC: 0.22 MG/DL (ref 0.3–1.2)
BUN BLDV-MCNC: 11 MG/DL (ref 6–20)
BUN BLDV-MCNC: 11 MG/DL (ref 6–20)
BUN/CREAT BLD: 20 (ref 9–20)
BUN/CREAT BLD: 20 (ref 9–20)
CALCIUM SERPL-MCNC: 9.3 MG/DL (ref 8.6–10.4)
CALCIUM SERPL-MCNC: 9.3 MG/DL (ref 8.6–10.4)
CHLORIDE BLD-SCNC: 100 MMOL/L (ref 98–107)
CHLORIDE BLD-SCNC: 100 MMOL/L (ref 98–107)
CO2: 26 MMOL/L (ref 20–31)
CO2: 26 MMOL/L (ref 20–31)
CREAT SERPL-MCNC: 0.54 MG/DL (ref 0.5–0.9)
CREAT SERPL-MCNC: 0.54 MG/DL (ref 0.5–0.9)
DIFFERENTIAL TYPE: ABNORMAL
EOSINOPHILS RELATIVE PERCENT: 3 % (ref 1–4)
ESTIMATED AVERAGE GLUCOSE: 120 MG/DL
GFR AFRICAN AMERICAN: >60 ML/MIN
GFR AFRICAN AMERICAN: >60 ML/MIN
GFR NON-AFRICAN AMERICAN: >60 ML/MIN
GFR NON-AFRICAN AMERICAN: >60 ML/MIN
GFR SERPL CREATININE-BSD FRML MDRD: ABNORMAL ML/MIN/{1.73_M2}
GLUCOSE BLD-MCNC: 115 MG/DL (ref 70–99)
GLUCOSE BLD-MCNC: 115 MG/DL (ref 70–99)
HBA1C MFR BLD: 5.8 % (ref 4.8–5.9)
HCT VFR BLD CALC: 40.7 % (ref 36.3–47.1)
HEMOGLOBIN: 12.5 G/DL (ref 11.9–15.1)
IMMATURE GRANULOCYTES: 0 %
LYMPHOCYTES # BLD: 20 % (ref 24–43)
MCH RBC QN AUTO: 28.7 PG (ref 25.2–33.5)
MCHC RBC AUTO-ENTMCNC: 30.7 G/DL (ref 25.2–33.5)
MCV RBC AUTO: 93.3 FL (ref 82.6–102.9)
MONOCYTES # BLD: 7 % (ref 3–12)
NRBC AUTOMATED: 0 PER 100 WBC
PDW BLD-RTO: 16 % (ref 11.8–14.4)
PLATELET # BLD: 264 K/UL (ref 138–453)
PLATELET ESTIMATE: ABNORMAL
PMV BLD AUTO: 11.2 FL (ref 8.1–13.5)
POTASSIUM SERPL-SCNC: 4 MMOL/L (ref 3.7–5.3)
POTASSIUM SERPL-SCNC: 4 MMOL/L (ref 3.7–5.3)
RBC # BLD: 4.36 M/UL (ref 3.95–5.11)
RBC # BLD: ABNORMAL 10*6/UL
SEG NEUTROPHILS: 69 % (ref 36–65)
SEGMENTED NEUTROPHILS ABSOLUTE COUNT: 6.53 K/UL (ref 1.5–8.1)
SODIUM BLD-SCNC: 142 MMOL/L (ref 135–144)
SODIUM BLD-SCNC: 142 MMOL/L (ref 135–144)
TOTAL PROTEIN: 7.1 G/DL (ref 6.4–8.3)
WBC # BLD: 9.5 K/UL (ref 3.5–11.3)
WBC # BLD: ABNORMAL 10*3/UL

## 2020-03-12 PROCEDURE — 11042 DBRDMT SUBQ TIS 1ST 20SQCM/<: CPT | Performed by: SURGERY

## 2020-03-12 PROCEDURE — 36415 COLL VENOUS BLD VENIPUNCTURE: CPT

## 2020-03-12 PROCEDURE — 83036 HEMOGLOBIN GLYCOSYLATED A1C: CPT

## 2020-03-12 PROCEDURE — 85025 COMPLETE CBC W/AUTO DIFF WBC: CPT

## 2020-03-12 PROCEDURE — 80048 BASIC METABOLIC PNL TOTAL CA: CPT

## 2020-03-12 PROCEDURE — 80053 COMPREHEN METABOLIC PANEL: CPT

## 2020-03-13 ENCOUNTER — TELEPHONE (OUTPATIENT)
Dept: WOUND CARE | Age: 55
End: 2020-03-13

## 2020-03-18 ENCOUNTER — TELEPHONE (OUTPATIENT)
Dept: SURGERY | Age: 55
End: 2020-03-18

## 2020-03-31 ENCOUNTER — TELEPHONE (OUTPATIENT)
Dept: WOUND CARE | Age: 55
End: 2020-03-31

## 2020-03-31 NOTE — TELEPHONE ENCOUNTER
Spoke with patient and she states that her wound is stable and that things are going well at this time. Patient states that her wound appears to be getting smaller and she also states that her current dressing changes are going well with [address]

## 2020-04-30 ENCOUNTER — TELEPHONE (OUTPATIENT)
Dept: FAMILY MEDICINE CLINIC | Age: 55
End: 2020-04-30
Payer: COMMERCIAL

## 2020-04-30 PROCEDURE — G0180 MD CERTIFICATION HHA PATIENT: HCPCS | Performed by: FAMILY MEDICINE

## 2020-05-22 RX ORDER — METOPROLOL TARTRATE 50 MG/1
TABLET, FILM COATED ORAL
Qty: 30 TABLET | Refills: 0 | Status: SHIPPED | OUTPATIENT
Start: 2020-05-22

## 2020-06-23 RX ORDER — MONTELUKAST SODIUM 10 MG/1
TABLET ORAL
Qty: 90 TABLET | Refills: 3 | Status: SHIPPED | OUTPATIENT
Start: 2020-06-23

## 2021-08-10 ENCOUNTER — TELEPHONE (OUTPATIENT)
Dept: RHEUMATOLOGY | Age: 56
End: 2021-08-10

## 2021-08-10 NOTE — TELEPHONE ENCOUNTER
Nadja Handley is calling stating that Dr Apollo Martinezers practicing anymore, and Rachel referred her, so she cant get an order for blood work from referring. Does she need to have it done, if so where can she get an order from, this appt was booked in Feb 2021.

## 2021-08-27 NOTE — PROGRESS NOTES
[place]   Date Of Service: 9/1/2021  Provider: Lisbeth Small DO, DO  Name: Irma Brownlee   MRN: [id number]    Chief Complaint(s)      Chief Complaint   Patient presents with    New Patient     referral from Dr Anant Keating for Rheumatoid Arthritis        History of Present illness (HPI)    Irma Brownlee   is a(n)55 y.o. female with a hx of Chronic low back pain, osteoporosis , rheumatoid arthritis with positive rheumatoid factor, osteoarthritis of multiple joints, chronic tobacco dependence, Osteoarthritis bilat hips s/p bilat THR referred by Alexandria Valencia MD for evaluation of rheumatoid arthritis    Rheuamtoid arthritis diagnosed around 26 y. o.a followed by Dr. Nanette Hayward and then transitioned to Dr. Anant Keating for the past 20 years - prednisone 5mg daily, plaquenil 200mg daily, and intermittent Im injectino of DepoMedrol and toraldol injection up to every 3 months. Prior tx: methotrexate po (n/v), subcu (lft elevation) , xelajnz (diarrhea and vomiting), Humira (nausea). S/p bilat total hip replacements. The patient reports pain affecting her bilat upper and lower extremities, neck and back. Pain up to 9.5/10 over  Variable pain. constant aching pain in the hands and lower back. Timing: mornings. Aggravating factors: cold exposures, weather changes, left ankle - wt bearing. Alleviating factors: norco, warmth, corticosteroid,. Active swelling bilat hands, ankles and feet. All day stiffness. Weakness of hand - difficulty with opening bottles. + radicular pain left (to feet) > right (to thigh) , urinary incontinence (usually in AM) -- using pad. Difficulty walking b/c of severe back. Denies weakness of legs with standing or walking. Occasional numbness in the left 5th finger. + dry mouth improved with gum.      Denies Photosenstivity, Rash, dry mouth/dry eyes, oral/nasal sores, Raynaud's, digital ulcerations, skin tightening, renal disease,foamy urination, HISTORY     has a past medical history of Anemia, Arthritis, Asthma, DJD (degenerative joint disease), GERD (gastroesophageal reflux disease), History of bleeding ulcers, and Rheumatoid arthritis(714.0). PAST SURGICAL HISTORY     has a past surgical history that includes Colonoscopy (11/08/2010); Colonoscopy (11/08/2010); Cholecystectomy; Spine surgery (2010); other surgical history (Right, 08/18/2011); other surgical history (12/11/2012); other surgical history (Right, 05/23/2013); other surgical history (Right, 01/23/2014); joint replacement (Bilateral, 05/05/2014); Lumbar spine surgery (10/19/2015); and Hysterectomy, total abdominal (04/12/2011). FAMILY HISTORY      Family History   Problem Relation Age of Onset    Heart Disease Paternal Grandmother     High Blood Pressure Paternal Grandmother     Heart Disease Paternal Grandfather     High Blood Pressure Paternal Grandfather     Heart Disease Father     High Blood Pressure Father     Arthritis Father     Rheum Arthritis Father     Stroke Maternal Grandmother     Diabetes Maternal Grandmother     Stroke Maternal Grandfather     Diabetes Maternal Grandfather     Arthritis Daughter     Rheum Arthritis Mother        SOCIAL HISTORY     reports that she has been smoking cigarettes. She started smoking about 41 years ago. She has a 40.00 pack-year smoking history. She has never used smokeless tobacco. She reports that she does not drink alcohol and does not use drugs.       ALLERGIES     Allergies   Allergen Reactions    Avelox [Moxifloxacin]      Rash and edema    Bactrim [Sulfamethoxazole-Trimethoprim]      hives    Cefuroxime Axetil Nausea Only    Codeine      Nausea and rash    Sulfa Antibiotics Hives    Xeljanz [Tofacitinib] Diarrhea     vomit       CURRENT MEDICATIONS      Current Outpatient Medications:     DULoxetine (CYMBALTA) 30 MG extended release capsule, duloxetine 30 mg capsule,delayed release, Disp: , Rfl:    , Rfl:     blood glucose test strips (EXACTECH TEST) strip, 1 strip by Other route 4 times daily, Disp: , Rfl:     PHYSICAL EXAMINATION / OBJECTIVE     Objective:  /70 (Site: Left Upper Arm, Position: Sitting, Cuff Size: Medium Adult)   Pulse 79   Ht 5' 5\" (1.651 m)   Wt 179 lb 12.8 oz (81.6 kg)   LMP 04/12/2012   SpO2 94%   BMI 29.92 kg/m²     General Appearance:   alert and oriented to person, place and time well-developed and well nourished  Physch : appropriate affects ,   Head:  Normocephalic and atraumatic  Eyes: No gross abnormalities. ,  PERRL, Sclera nonicteric, conjunctiva non-INJECTED  ENT:  MMM,  no deformities , NO oral/nasal sores, Non-tender sinuses. Neck:  Neck supple, Non-tender, No  mass, thyromegaly,    Lymph:  No cervical  or  supraclavicular lymph node swelling. Pulmonary/Chest:  CTA bilat. , normal air movement, no respiratory distress  Cardiovascular:  Normal rate, + S1 and S2,  NO murmurs , rubs, gallups,     * edema  :  Deferred   Abd/GI: Deferred   Neurologic:  gait and coordination normal and speech normal  Skin:  Skin color and temp ,  nodules on fingers. Extremities:  No clubbing ,     Musculoskeletal:    Upper extremities:   Decreased  strength 4+/5 shoulder , 4+-5 bicpe, tricpt. SHOULDERS --- tender bilat. Swelling bilateral   ELBOWS - tender bilat,   WRISTS  - tender, swelling bilat, limited flexion/ext. HANDS/FINGERS + tender bilat MCPs, swleling right MCPS, PIPs tender bilat. , swellinb ilat. + swen neck changes, distal left 2nd finger amupation      Lower extremities:  HIPS tender bilat  KNEES tender bila  ANKLES  - tender bilat MTPs. FEET : tender and swelling bilat MTPS, lateral deviation of the toes, with bilat bunion. Spine:   C-spine, T-spine & L-spine:  Tender lumbar spine, mid back. Occiput to wall , SLR/Cross SLR.         KEY:  Tender :  T  Swelling: S  Non-tender : NT  No swelling: NS         RAPID 3  -- Composite Score MDHAQ (0-10) + Patient pain VAS (0-10): + Patient global assessment VAS (0-10):       LABS        CBC  Lab Results   Component Value Date    WBC 9.5 03/12/2020    RBC 4.36 03/12/2020    HGB 12.5 03/12/2020    HCT 40.7 03/12/2020    MCV 93.3 03/12/2020    MCH 28.7 03/12/2020    MCHC 30.7 03/12/2020    RDW 16.0 03/12/2020     03/12/2020       CMP  Lab Results   Component Value Date    CALCIUM 9.3 03/12/2020    LABALBU 3.3 03/12/2020    PROT 7.1 03/12/2020     03/12/2020    K 4.0 03/12/2020    CO2 26 03/12/2020     03/12/2020    BUN 11 03/12/2020    CREATININE 0.54 03/12/2020    ALKPHOS 78 03/12/2020    ALT 10 03/12/2020    AST 20 03/12/2020       HgBA1c: No components found for: HGBA1C    Lab Results   Component Value Date    TSH 1.79 11/26/2018     No results found for: VITD25      No results found for: ANASCRN  No results found for: SSA  No results found for: SSB  No results found for: ANTI-SMITH  No results found for: DSDNAAB   No results found for: ANTIRNP  No results found for: C3, C4  No results found for: CCPAB  No results found for: RF    No components found for: CANCASCRN, APANCASCRN  No results found for: SEDRATE  No results found for: CRP    RADIOLOGY:         Interface, Incoming Radiant Image Availability - 05/06/2021  5:03 PM EDT   Formatting of this note might be different from the original.   [facility]   Diagnostic Imaging Report     EXAM INFORMATION:   Examination: DEXA BONE MINERAL ANALYSIS   Date of Exam: 5/6/2021 1:15 pm   Diagnosis/Reason for Exam: Chronic bilateral low back pain with bilateral sciatica; Rheumatoid arthritis, involving unspecified site, unspecified whether rheumatoid factor present (CMS/HCC);  Current chronic use of systemic steroids; Tobacco abuse; S/P   lumbar fusion; Spinal stenosis of lumbar region with neurogenic claudication   Additional History: chronic low back pain, current use of systemic steroids.  Surgical history includes bilateral hip prosthesis and lumbar fusion.  56 y/o , Female. Comparison: None. DISCUSSION:   Bone mineral density (BMD) evaluation of the lumbar spine and hip was performed using DXA technique is compared to young adult controls of the same sex, ethnicity and, if possible, nationality. BMD of the lumbar spine L1-L3 is 0.908 g/cm2 with a T-score of -2.2.  Fracture risk is increased 4.1 fold versus young adult controls.      BMD of the left forearm (radius 33%) is 0.548 g/cm2 with a T-score of -3.8. Per World Health Organization criteria T-score values correlate with mineralization assessment as below:   > -1.0: The mineralization is within normal limits. -1.0 to > -2.5: Osteopenia. < or  = -2.5: Osteoporosis. IMPRESSION:   1. Osteopenia of the lumbar spine. 2. Osteoporosis of the left forearm (radius 33%). LUMBAR SPINE, 4 VIEWS     CLINICAL STATEMENT:  Spinal stenosis, lumbar region with neurogenic claudication     TECHNIQUE: 4 views including AP view, lateral view, lateral extension and flexion views. FINDINGS: Transpedicular fusion at L3-L4, no hardware lucency.  The heights of the vertebral bodies are within normal limits. There are multilevel degenerative endplate changes and disc space narrowing mainly at L2-L3 and L5-S1. The visualized part of the sacroiliac joints is unremarkable.  The   bowel gas pattern is unremarkable.  No abnormal calcifications are seen projecting over the kidneys. Extension and flexion views show decreased range of motion mainly during extension, without pathological movement. IMPRESSION:     1. Transpedicular fusion at L3-L4.    2.  Degenerative spondylosis at L2-L3 and L5-S1.   3. Decreased range of motion mainly during extension, without pathological movement       CT ABDOMEN WITHOUT IV CONTRAST     CLINICAL STATEMENT: Pelvic pain     CT PELVIS WITHOUT IV CONTRAST     TECHNIQUE:  Axial views were obtained at 5 mm interval from the iliac crest down to lesser trochanters without IV contrast.         Individualized dose optimization techniques were used during this procedure. FINDINGS: [place] is severe erosion of the femoral head consistent with chronic arthropathy may represent the chondrocalcinosis or advanced erosive osteoarthritis. There is no significant joint effusion to suggest an infectious process. There is some a healing fracture of the medial and anterior walls of the left acetabulum. Healing fractures also noted in the right superior and inferior pubic rami. There is right hip arthroplasty in good alignment   Degenerative changes of the sacral joints. Degenerative changes are also noted in. The urinary bladder is unremarkable. The appendix is normal.     IMPRESSION:     Advanced erosive arthropathy in the left hip. Right hip arthroplasty in good alignment. Healing fracture of the anterior and medial walls of the      Interface - Rad Results/Orders In 1 - 03/31/2020  1:54 PM EDT   Formatting of this note might be different from the original.     MRI CERVICAL SPINE WITHOUT IV CONTRAST     CLINICAL STATEMENT: Neck pain. TECHNIQUE:  Sagittal and axial fast spin echo T2-weighted images and sagittal and axial spin echo T1-weighted images were performed. FINDINGS:  The cervical spinal cord is normal in size and signal characteristics.  The visualized osseous structures in the skull base show no definite abnormality.  The cervico-occipital junction is unremarkable. At C1-C2, there is no abnormality. At C2-C3, C6-C7, and C7-T1, the disks, the spinal canal, and the neural foramina are unremarkable.       At C3-C4, C4-C5,  there is moderate narrowing of the spinal canal due to broad based disc bulge and degenerative changes in the facet joints. At C5-C6,  there is moderate narrowing of the spinal canal due to broad based disc bulge and degenerative changes in the facet joints.      The paravertebral soft tissues are unremarkable.  The upper esophagus and the upper trachea show no definite abnormality. IMPRESSION:     At C3-C4, C4-C5,  there is moderate narrowing of the spinal canal due to broad based disc bulge and degenerative changes in the facet joints. At C5-C6,  there is moderate narrowing of the spinal canal due to broad based disc bulge and degenerative changes in the facet joints. ASSESSMENT/PLAN      1. Rheumatoid arthritis involving multiple sites with positive rheumatoid factor ([address]    2. Other secondary osteoarthritis of multiple sites    3. Osteoporosis with current pathological fracture, unspecified osteoporosis type, initial encounter    4. Chronic midline low back pain with bilateral sciatica    5. Tobacco dependence    6. Current chronic use of systemic steroids    7. Medication monitoring encounter    8. Rheumatoid nodules ([facility] [address]      1. Rheumatoid arthritis involving multiple sites with positive rheumatoid factor ([facility] [address] [facility]  2. Rheumatoid nodulous.   -Seropositive rheumatoid arthritis diagnosed in her 35s treated with multiple other medications such as methotrexate with intolerance, Humira, Xeljanz, intermittent steroid injections . Currently on prednisone and Plaquenil. Physical examination revealing generalized synovitis, swan-neck deformities of bilateral hands, lateral deviation of the feet   - Would like to pursue leflunomide and potentially    - biologic agent such as Rituxan, enbrel,  Orencia or IL-6 inhibitor   - cont. Plaquenil 200mg twice daily, and prednisone 5mg daily.     - CBC Auto Differential; Future  - Comprehensive Metabolic Panel; Future  - Sedimentation Rate; Future  - C-Reactive Protein; Future  - Cyclic Citrul Peptide Antibody, IgG; Future  - Rheumatoid Factor; Future  - Anti SSA; Future  - Anti SSB; Future  - STACY Screen with Reflex; Future  - Hepatitis Panel, Acute; Future  - XR HAND RIGHT (MIN 3 VIEWS); Future  - XR HAND LEFT (MIN 3 VIEWS); Future  - XR CHEST (2 VW); Future    3.  Other secondary osteoarthritis of multiple sites   - likely related to her rheumatoid arthritis. - XR HAND RIGHT (MIN 3 VIEWS); Future  - XR HAND LEFT (MIN 3 VIEWS); Future    4. Osteoporosis with current pathological fracture, unspecified osteoporosis type, initial encounter    - request dosings of prolia from [address] [place] [address]    - discussed evenity, pth analogy vs prolia. Patient would like to restart prolia. - CBC Auto Differential; Future  - Comprehensive Metabolic Panel; Future  - Sedimentation Rate; Future  - C-Reactive Protein; Future  - Magnesium; Future  - Phosphorus; Future  - Vitamin D 25 Hydroxy; Future    5. Chronic midline low back pain with bilateral sciatica  6. scloliosis   7. H/o cervicalgia and cervial spinal stenosis. -     8. Tobacco dependence   - smoking 1ppd x 4 0years. 9. Current chronic use of systemic steroids   - on steroids for the past 20 years. 10. Medication monitoring encounter   - labs q 4 weeks with planned start of arava    -  tb gold, and hepatitis testing. Prior to starting biologic therapy. - CBC Auto Differential; Future  - Comprehensive Metabolic Panel; Future  - Sedimentation Rate; Future  - C-Reactive Protein; Future           Return in about 2 months (around 11/1/2021). Electronically signed by Naldo Romo DO on 9/1/2021 at 5:15 PM    New Prescriptions    No medications on file       9/1/2021       The risks and benefits of my recommendations, as well as other treatment options, benefits and side effects were discussed with the patient today. Questions were answered. Thank you for allowing me to participate in the care of this patient. Please call if there are any questions.

## 2021-09-01 ENCOUNTER — OFFICE VISIT (OUTPATIENT)
Dept: RHEUMATOLOGY | Age: 56
End: 2021-09-01
Payer: COMMERCIAL

## 2021-09-01 VITALS
HEART RATE: 79 BPM | BODY MASS INDEX: 29.96 KG/M2 | SYSTOLIC BLOOD PRESSURE: 110 MMHG | OXYGEN SATURATION: 94 % | DIASTOLIC BLOOD PRESSURE: 70 MMHG | WEIGHT: 179.8 LBS | HEIGHT: 65 IN

## 2021-09-01 DIAGNOSIS — F17.200 TOBACCO DEPENDENCE: ICD-10-CM

## 2021-09-01 DIAGNOSIS — M05.79 RHEUMATOID ARTHRITIS INVOLVING MULTIPLE SITES WITH POSITIVE RHEUMATOID FACTOR (HCC): Primary | ICD-10-CM

## 2021-09-01 DIAGNOSIS — M80.00XA OSTEOPOROSIS WITH CURRENT PATHOLOGICAL FRACTURE, UNSPECIFIED OSTEOPOROSIS TYPE, INITIAL ENCOUNTER: ICD-10-CM

## 2021-09-01 DIAGNOSIS — Z79.52 CURRENT CHRONIC USE OF SYSTEMIC STEROIDS: ICD-10-CM

## 2021-09-01 DIAGNOSIS — M54.42 CHRONIC MIDLINE LOW BACK PAIN WITH BILATERAL SCIATICA: ICD-10-CM

## 2021-09-01 DIAGNOSIS — M54.41 CHRONIC MIDLINE LOW BACK PAIN WITH BILATERAL SCIATICA: ICD-10-CM

## 2021-09-01 DIAGNOSIS — M06.30 RHEUMATOID NODULES (HCC): ICD-10-CM

## 2021-09-01 DIAGNOSIS — Z51.81 MEDICATION MONITORING ENCOUNTER: ICD-10-CM

## 2021-09-01 DIAGNOSIS — G89.29 CHRONIC MIDLINE LOW BACK PAIN WITH BILATERAL SCIATICA: ICD-10-CM

## 2021-09-01 DIAGNOSIS — M15.3 OTHER SECONDARY OSTEOARTHRITIS OF MULTIPLE SITES: ICD-10-CM

## 2021-09-01 PROCEDURE — G8427 DOCREV CUR MEDS BY ELIG CLIN: HCPCS | Performed by: INTERNAL MEDICINE

## 2021-09-01 PROCEDURE — 99244 OFF/OP CNSLTJ NEW/EST MOD 40: CPT | Performed by: INTERNAL MEDICINE

## 2021-09-01 PROCEDURE — G8419 CALC BMI OUT NRM PARAM NOF/U: HCPCS | Performed by: INTERNAL MEDICINE

## 2021-09-01 RX ORDER — DULOXETIN HYDROCHLORIDE 30 MG/1
CAPSULE, DELAYED RELEASE ORAL
COMMUNITY
Start: 2021-04-19

## 2021-09-01 RX ORDER — HYDROCODONE BITARTRATE AND ACETAMINOPHEN 10; 325 MG/1; MG/1
1 TABLET ORAL EVERY 6 HOURS PRN
COMMUNITY

## 2021-10-12 LAB
MAGNESIUM: 1.6 MG/DL
PHOSPHORUS: 4.3 MG/DL
RHEUMATOID FACTOR: >360
VITAMIN D 25-HYDROXY: 103.5
VITAMIN D2, 25 HYDROXY: NORMAL
VITAMIN D3,25 HYDROXY: NORMAL

## 2021-10-13 DIAGNOSIS — M05.79 RHEUMATOID ARTHRITIS INVOLVING MULTIPLE SITES WITH POSITIVE RHEUMATOID FACTOR (HCC): ICD-10-CM

## 2021-10-14 ENCOUNTER — TELEPHONE (OUTPATIENT)
Dept: RHEUMATOLOGY | Age: 56
End: 2021-10-14

## 2021-10-14 DIAGNOSIS — Z51.81 MEDICATION MONITORING ENCOUNTER: ICD-10-CM

## 2021-10-14 DIAGNOSIS — M05.79 RHEUMATOID ARTHRITIS INVOLVING MULTIPLE SITES WITH POSITIVE RHEUMATOID FACTOR (HCC): Primary | ICD-10-CM

## 2021-10-14 RX ORDER — LEFLUNOMIDE 10 MG/1
10 TABLET ORAL DAILY
Qty: 30 TABLET | Refills: 0 | Status: SHIPPED | OUTPATIENT
Start: 2021-10-14 | End: 2022-01-13

## 2021-10-14 NOTE — TELEPHONE ENCOUNTER
----- Message from Lauren Boogie DO sent at 10/13/2021  5:46 PM EDT -----  The chest x-ray revealed no significant abnormalities. Please asked the patient if she is willing to start leflunomide 10 mg daily with potential titration as needed. - Side effects: GI intolerance, diarrhea, alopecia, infection; weight loss, low blood counts, liver injury, ILD, oral sores, rash; RARE: neuropathy, interstitial lung disease.  Known teratogen (causes birth defects)  - hold medication with infections, avoid Alcohol consumption while on the medication.  - blood tests will be needed every 2-4 weeks with the start of arava

## 2021-10-14 NOTE — TELEPHONE ENCOUNTER
Diagnosis Orders   1. Rheumatoid arthritis involving multiple sites with positive rheumatoid factor (HCC)  leflunomide (ARAVA) 10 MG tablet   2.  Medication monitoring encounter  CBC Auto Differential    Comprehensive Metabolic Panel    C-Reactive Protein    Sedimentation Rate

## 2021-10-19 ENCOUNTER — TELEPHONE (OUTPATIENT)
Dept: RHEUMATOLOGY | Age: 56
End: 2021-10-19

## 2021-10-19 DIAGNOSIS — M15.3 OTHER SECONDARY OSTEOARTHRITIS OF MULTIPLE SITES: ICD-10-CM

## 2021-10-19 DIAGNOSIS — M05.79 RHEUMATOID ARTHRITIS INVOLVING MULTIPLE SITES WITH POSITIVE RHEUMATOID FACTOR (HCC): ICD-10-CM

## 2021-10-19 NOTE — TELEPHONE ENCOUNTER
Lisa Markham from [facility] department called stating pt IGA HEP A was positive we never received results called over to [facility] where pt was tested at request labs to be sent for our review

## 2021-10-20 ENCOUNTER — TELEPHONE (OUTPATIENT)
Dept: RHEUMATOLOGY | Age: 56
End: 2021-10-20

## 2021-10-21 NOTE — TELEPHONE ENCOUNTER
Pt  called [facility] department called them and informed of hep results, I apologized and I let him know we never received the results so we was unaware of this until yesterday and once we found out we requested results, I checked all received faxes and so far nothing was there. [facility]Omayra and asked for labs again.  Called pt back and informed

## 2021-10-27 DIAGNOSIS — R76.8 HEPATITIS A ANTIBODY POSITIVE: Primary | ICD-10-CM

## 2021-10-28 ENCOUNTER — TELEPHONE (OUTPATIENT)
Dept: RHEUMATOLOGY | Age: 56
End: 2021-10-28

## 2021-10-28 NOTE — TELEPHONE ENCOUNTER
----- Message from Kian Mcleod DO sent at 10/27/2021  5:51 PM EDT -----  Please inform the patient that the blood testing to help evaluate for rheumatoid arthritis were strong positive. This testing was negative. You hepatitis screening test was abnormal for hepatitis A IgM antibody. This can be seen with acute hepatitis infection. Your liver function tests revealed no significant abnormalities. I would like to have this test repeated at your next follow-up appointment.

## 2021-11-10 ENCOUNTER — OFFICE VISIT (OUTPATIENT)
Dept: RHEUMATOLOGY | Age: 56
End: 2021-11-10
Payer: COMMERCIAL

## 2021-11-10 ENCOUNTER — NURSE ONLY (OUTPATIENT)
Dept: LAB | Age: 56
End: 2021-11-10

## 2021-11-10 VITALS
WEIGHT: 179.9 LBS | DIASTOLIC BLOOD PRESSURE: 60 MMHG | BODY MASS INDEX: 29.97 KG/M2 | HEIGHT: 65 IN | SYSTOLIC BLOOD PRESSURE: 100 MMHG | OXYGEN SATURATION: 95 % | HEART RATE: 76 BPM

## 2021-11-10 DIAGNOSIS — M05.79 RHEUMATOID ARTHRITIS INVOLVING MULTIPLE SITES WITH POSITIVE RHEUMATOID FACTOR (HCC): Primary | ICD-10-CM

## 2021-11-10 DIAGNOSIS — R76.8 HEPATITIS A ANTIBODY POSITIVE: ICD-10-CM

## 2021-11-10 DIAGNOSIS — Z79.52 CURRENT CHRONIC USE OF SYSTEMIC STEROIDS: ICD-10-CM

## 2021-11-10 DIAGNOSIS — M80.00XA OSTEOPOROSIS WITH CURRENT PATHOLOGICAL FRACTURE, UNSPECIFIED OSTEOPOROSIS TYPE, INITIAL ENCOUNTER: ICD-10-CM

## 2021-11-10 DIAGNOSIS — M15.3 OTHER SECONDARY OSTEOARTHRITIS OF MULTIPLE SITES: ICD-10-CM

## 2021-11-10 DIAGNOSIS — M06.30 RHEUMATOID NODULES (HCC): ICD-10-CM

## 2021-11-10 DIAGNOSIS — M54.41 CHRONIC MIDLINE LOW BACK PAIN WITH BILATERAL SCIATICA: ICD-10-CM

## 2021-11-10 DIAGNOSIS — M54.42 CHRONIC MIDLINE LOW BACK PAIN WITH BILATERAL SCIATICA: ICD-10-CM

## 2021-11-10 DIAGNOSIS — Z51.81 MEDICATION MONITORING ENCOUNTER: ICD-10-CM

## 2021-11-10 DIAGNOSIS — F17.200 TOBACCO DEPENDENCE: ICD-10-CM

## 2021-11-10 DIAGNOSIS — G89.29 CHRONIC MIDLINE LOW BACK PAIN WITH BILATERAL SCIATICA: ICD-10-CM

## 2021-11-10 LAB
ALBUMIN SERPL-MCNC: 3.3 G/DL (ref 3.5–5.1)
ALP BLD-CCNC: 75 U/L (ref 38–126)
ALT SERPL-CCNC: 10 U/L (ref 11–66)
ANION GAP SERPL CALCULATED.3IONS-SCNC: 11 MEQ/L (ref 8–16)
AST SERPL-CCNC: 15 U/L (ref 5–40)
BASOPHILS # BLD: 1.2 %
BASOPHILS ABSOLUTE: 0.1 THOU/MM3 (ref 0–0.1)
BILIRUB SERPL-MCNC: 0.3 MG/DL (ref 0.3–1.2)
BUN BLDV-MCNC: 10 MG/DL (ref 7–22)
C-REACTIVE PROTEIN: 0.73 MG/DL (ref 0–1)
CALCIUM SERPL-MCNC: 8.8 MG/DL (ref 8.5–10.5)
CHLORIDE BLD-SCNC: 103 MEQ/L (ref 98–111)
CO2: 27 MEQ/L (ref 23–33)
CREAT SERPL-MCNC: 0.5 MG/DL (ref 0.4–1.2)
EOSINOPHIL # BLD: 0.6 %
EOSINOPHILS ABSOLUTE: 0 THOU/MM3 (ref 0–0.4)
ERYTHROCYTE [DISTWIDTH] IN BLOOD BY AUTOMATED COUNT: 15.2 % (ref 11.5–14.5)
ERYTHROCYTE [DISTWIDTH] IN BLOOD BY AUTOMATED COUNT: 49.7 FL (ref 35–45)
GFR SERPL CREATININE-BSD FRML MDRD: > 90 ML/MIN/1.73M2
GLUCOSE BLD-MCNC: 152 MG/DL (ref 70–108)
HAV IGM SER IA-ACNC: NEGATIVE
HCT VFR BLD CALC: 42.4 % (ref 37–47)
HEMOGLOBIN: 13.2 GM/DL (ref 12–16)
IMMATURE GRANS (ABS): 0.01 THOU/MM3 (ref 0–0.07)
IMMATURE GRANULOCYTES: 0.2 %
LYMPHOCYTES # BLD: 19.1 %
LYMPHOCYTES ABSOLUTE: 1 THOU/MM3 (ref 1–4.8)
MCH RBC QN AUTO: 28.1 PG (ref 26–33)
MCHC RBC AUTO-ENTMCNC: 31.1 GM/DL (ref 32.2–35.5)
MCV RBC AUTO: 90.4 FL (ref 81–99)
MONOCYTES # BLD: 7.1 %
MONOCYTES ABSOLUTE: 0.4 THOU/MM3 (ref 0.4–1.3)
NUCLEATED RED BLOOD CELLS: 0 /100 WBC
PLATELET # BLD: 153 THOU/MM3 (ref 130–400)
PMV BLD AUTO: 12.6 FL (ref 9.4–12.4)
POTASSIUM SERPL-SCNC: 4.2 MEQ/L (ref 3.5–5.2)
RBC # BLD: 4.69 MILL/MM3 (ref 4.2–5.4)
SEDIMENTATION RATE, ERYTHROCYTE: 75 MM/HR (ref 0–20)
SEG NEUTROPHILS: 71.8 %
SEGMENTED NEUTROPHILS ABSOLUTE COUNT: 3.7 THOU/MM3 (ref 1.8–7.7)
SODIUM BLD-SCNC: 141 MEQ/L (ref 135–145)
TOTAL PROTEIN: 6.7 G/DL (ref 6.1–8)
WBC # BLD: 5.1 THOU/MM3 (ref 4.8–10.8)

## 2021-11-10 PROCEDURE — G8484 FLU IMMUNIZE NO ADMIN: HCPCS | Performed by: NURSE PRACTITIONER

## 2021-11-10 PROCEDURE — G8419 CALC BMI OUT NRM PARAM NOF/U: HCPCS | Performed by: NURSE PRACTITIONER

## 2021-11-10 PROCEDURE — 4004F PT TOBACCO SCREEN RCVD TLK: CPT | Performed by: NURSE PRACTITIONER

## 2021-11-10 PROCEDURE — G8427 DOCREV CUR MEDS BY ELIG CLIN: HCPCS | Performed by: NURSE PRACTITIONER

## 2021-11-10 PROCEDURE — 99214 OFFICE O/P EST MOD 30 MIN: CPT | Performed by: NURSE PRACTITIONER

## 2021-11-10 PROCEDURE — 3017F COLORECTAL CA SCREEN DOC REV: CPT | Performed by: NURSE PRACTITIONER

## 2021-11-10 RX ORDER — CUSHION
EACH MISCELLANEOUS
Qty: 1 EACH | Refills: 0 | Status: SHIPPED | OUTPATIENT
Start: 2021-11-10

## 2021-11-10 ASSESSMENT — ENCOUNTER SYMPTOMS
NAUSEA: 0
EYE PAIN: 0
ABDOMINAL PAIN: 0
CONSTIPATION: 0
BACK PAIN: 0
TROUBLE SWALLOWING: 0
SHORTNESS OF BREATH: 0
COUGH: 0
DIARRHEA: 0
EYE ITCHING: 0

## 2021-11-10 NOTE — PROGRESS NOTES
[facility] RHEUMATOLOGY FOLLOW UP NOTE       Date Of Service: 11/10/2021  Provider: JOSE Alanis - CNP    Name: Heather Santos   MRN: [id number]    Chief Complaint(s)     Chief Complaint   Patient presents with    Follow-up     2 month Rheumatoid Arthritis         History of Present Illness (HPI)     Heather Santos  is a(n)55 y.o. female with a hx of chronic low back pain, osteoporosis, rheumatoid arthritis, osteoarthritis of multiple joints, chronic tobacco dependence, osteoarthritis bilat hips s/p bilat THR here for the f/u evaluation of rheumatoid arthritis. Interval hx:    - hair loss with arava started 1 month ago    pain affecting the fingers, wrists, elbows, shoulders, hips, knees, ankles, toes, neck, back  Pain on a scale 0-10: 7.5/10  Type of pain: constant hands, low back  Timing: mornings  Aggravating factors: cold exposure, weather changes, left ankle: wt bearing  Alleviating factors: norco, warmth, corticosteroid    Associated symptoms:  denies swelling/  Redness/ warmth, denies AM stiffness        REVIEW OF SYSTEMS: (ROS)    Review of Systems   Constitutional: Negative for fatigue, fever and unexpected weight change. HENT: Negative for congestion and trouble swallowing. Eyes: Negative for pain and itching. Respiratory: Negative for cough and shortness of breath. Cardiovascular: Negative for chest pain and leg swelling. Gastrointestinal: Negative for abdominal pain, constipation, diarrhea and nausea. Endocrine: Negative for cold intolerance and heat intolerance. Genitourinary: Negative for difficulty urinating, frequency and urgency. Musculoskeletal: Positive for arthralgias. Negative for back pain and joint swelling. Skin: Negative for rash. Neurological: Negative for dizziness, weakness, numbness and headaches. Psychiatric/Behavioral: The patient is not nervous/anxious.         PAST MEDICAL HISTORY      Past Medical History:   Diagnosis Date    Anemia     Arthritis     rheumatoid    Asthma     DJD (degenerative joint disease)     GERD (gastroesophageal reflux disease)     History of bleeding ulcers     Rheumatoid arthritis(714.0)        PAST SURGICAL HISTORY      Past Surgical History:   Procedure Laterality Date    CHOLECYSTECTOMY      COLONOSCOPY  11/08/2010    1CM RECURRENT SESSILE POLYP REMOVED AND CAUTERIZED    COLONOSCOPY  11/08/2010    5MM POLYP AT 20CM FROM ANAL VERGE  REMOVED AND BIOPSIED WITH COLD BIOPSY FORCEPS    HYSTERECTOMY, TOTAL ABDOMINAL  04/12/2011    JOINT REPLACEMENT Bilateral 05/05/2014    Dr Ed Beltran   [place] [id number]  10/19/2015    removal of L4/L5 hardware and fusion of L3/L4 lumbar spine by Dr. Tanda Osgood Right 08/18/2011    SIJ    OTHER SURGICAL HISTORY  12/11/2012    caudal epidural    OTHER SURGICAL HISTORY Right 05/23/2013     L4 TFE    OTHER SURGICAL HISTORY Right 01/23/2014    HIP INJECTION    SPINE SURGERY  2010    Fusion and cage        FAMILY HISTORY      Family History   Problem Relation Age of Onset    Heart Disease Paternal Grandmother     High Blood Pressure Paternal Grandmother     Heart Disease Paternal Grandfather     High Blood Pressure Paternal Grandfather     Heart Disease Father     High Blood Pressure Father     Arthritis Father     Rheum Arthritis Father     Stroke Maternal Grandmother     Diabetes Maternal Grandmother     Stroke Maternal Grandfather     Diabetes Maternal Grandfather     Arthritis Daughter     Rheum Arthritis Mother        SOCIAL HISTORY      Social History     Tobacco History     Smoking Status  Current Every Day Smoker Smoking Start Date  1/1/1980 Smoking Frequency  1 pack/day for 40 years (36 pk yrs) Smoking Tobacco Type  Cigarettes    Smokeless Tobacco Use  Never Used          Alcohol History     Alcohol Use Status  No          Drug Use     Drug Use Status  No          Sexual Activity     Sexually Active  Not Currently Partners  Male ALLERGIES     Allergies   Allergen Reactions    Avelox [Moxifloxacin]      Rash and edema    Bactrim [Sulfamethoxazole-Trimethoprim]      hives    Cefuroxime Axetil Nausea Only    Codeine      Nausea and rash    Sulfa Antibiotics Hives    Xeljanz [Tofacitinib] Diarrhea     vomit       CURRENT MEDICATIONS      Current Outpatient Medications   Medication Sig Dispense Refill    leflunomide (ARAVA) 10 MG tablet Take 1 tablet by mouth daily 30 tablet 0    DULoxetine (CYMBALTA) 30 MG extended release capsule duloxetine 30 mg capsule,delayed release      HYDROcodone-acetaminophen (NORCO)  MG per tablet Take 1 tablet by mouth every 6 hours as needed for Pain.  montelukast (SINGULAIR) 10 MG tablet TAKE 1 TABLET BY MOUTH ONE TIME A DAY 90 tablet 3    metoprolol tartrate (LOPRESSOR) 50 MG tablet TAKE ONE-HALF TABLET BY MOUTH TWICE A DAY 30 tablet 0    Elastic Bandages & Supports (MEDICAL COMPRESSION STOCKINGS) MISC 1 each by NOT APPLICABLE route daily bilateral      diclofenac sodium 1 % GEL Apply topically nightly      Lift Chair MISC by Does not apply route 1 each 0    traZODone (DESYREL) 100 MG tablet Take 1 tablet by mouth nightly 90 tablet 1    BD PEN NEEDLE JEFF U/F 32G X 4 MM MISC       Lancets Misc. (ACCU-CHEK FASTCLIX LANCET) KIT       melatonin 5 MG TBDP disintegrating tablet Take 10 mg by mouth nightly      diphenhydrAMINE HCl (BENADRYL ALLERGY PO) Take 25 mg by mouth nightly      blood glucose test strips (EXACTECH TEST) strip 1 strip by Other route 4 times daily      esomeprazole (NEXIUM) 40 MG delayed release capsule TAKE 1 CAPSULE BY MOUTH 2 TIMES A DAY 60 capsule 5    ondansetron (ZOFRAN ODT) 4 MG disintegrating tablet Take 1 tablet by mouth every 8 hours as needed for Nausea or Vomiting 30 tablet 2    lidocaine (LIDODERM) 5 % Place 1 patch onto the skin daily 12 hours on, 12 hours off.  30 patch 5    Compression Stockings MISC by Does not apply route Needs 20-30mmHg 6 each 0    calcium carbonate (OSCAL) 500 MG TABS tablet Take 500 mg by mouth daily      Loratadine 10 MG CAPS Take by mouth      Cholecalciferol (VITAMIN D) 2000 UNITS CAPS capsule Take 2,000 Units by mouth 2 times daily      predniSONE (DELTASONE) 5 MG tablet Take 5 mg by mouth daily       hydroxychloroquine (PLAQUENIL) 200 MG tablet Take by mouth One every morning and every night       No current facility-administered medications for this visit. PHYSICAL EXAMINATION / OBJECTIVE   Objective:  Ht 5' 5\" (1.651 m)   Wt 179 lb 14.3 oz (81.6 kg)   LMP 04/12/2012   BMI 29.94 kg/m²     Physical Exam  Vitals reviewed. Constitutional:       Appearance: She is well-developed. Cardiovascular:      Rate and Rhythm: Normal rate and regular rhythm. Pulmonary:      Effort: Pulmonary effort is normal.      Breath sounds: Normal breath sounds. Abdominal:      Palpations: Abdomen is soft. Tenderness: There is no abdominal tenderness. Musculoskeletal:      Cervical back: Normal range of motion and neck supple. Skin:     General: Skin is warm and dry. Findings: No rash. Neurological:      Mental Status: She is alert and oriented to person, place, and time. Deep Tendon Reflexes: Reflexes are normal and symmetric. Psychiatric:         Thought Content: Thought content normal.       Upper extremities:    SHOULDERS tender and swelling bilat ,   ELBOWS tender bilat,   WRISTS tender and swelling bilat, limited ROM,   HANDS/FINGERS     MCPs + tender bilat, + swelling right    PIPs + tender and swelling bilat + swan neck changes, distal left 2nd finger amputation    Lower extremities:  HIPS tender bilat outer hips  KNEES tender bilat, no swelling  ANKLES tender bilat   FEET : tender and swelling bilat MTPs, + lateral deviation of toes.  + bunions        LABS    CBC  Lab Results   Component Value Date    WBC 9.5 03/12/2020    RBC 4.36 03/12/2020    HGB 12.5 03/12/2020    HCT 40.7 03/12/2020    MCV 93.3 03/12/2020    MCH 28.7 03/12/2020    MCHC 30.7 03/12/2020    RDW 16.0 03/12/2020     03/12/2020       CMP  Lab Results   Component Value Date    CALCIUM 9.3 03/12/2020    LABALBU 3.3 03/12/2020    PROT 7.1 03/12/2020     03/12/2020    K 4.0 03/12/2020    CO2 26 03/12/2020     03/12/2020    BUN 11 03/12/2020    CREATININE 0.54 03/12/2020    ALKPHOS 78 03/12/2020    ALT 10 03/12/2020    AST 20 03/12/2020       HgBA1c: No components found for: HGBA1C    Lab Results   Component Value Date    VITD25 103.5 10/12/2021         No results found for: ANASCRN  No results found for: SSA  No results found for: SSB  No results found for: ANTI-SMITH  No results found for: DSDNAAB   No results found for: ANTIRNP  No results found for: C3, C4  No results found for: CCPAB  Lab Results   Component Value Date    RF >360 10/12/2021       No components found for: CANCASCRN, APANCASCRN  No results found for: SEDRATE  No results found for: CRP    RADIOLOGY:         ASSESSMENT/PLAN    Assessment   Plan     Seropositive rheumatoid arthritis +CCP >300, RF >360  Rheumatoid nodulosis  - diagnosed in 30's  - prior tx: methotrexate PO (nausea), IV (LFT elevation), xeljanz (diarrhea and vomiting), humira (nausea)     - plaquenil 200 mg BID    - prednisone 5 mg daily   - stop arava due to hair loss   - discussed orencia 125 mg subcu weekly- awaiting repeat hepatitis results    Hepatitis A ab +   - repeat testing ordered    Osteoarthritis of multiple joints  - likely 2/2 to #1    Chronic use of systemic steroids  Osteoporosis with current fracture  - on steroids for past 20 years.  DEXA 12/19/2019 w/ T score -3.6 right forearm   - prolia 60 mg subcu q 6 months     Chronic midline low back pain with bilateral sciatica  Scoliosis  H/o cervicalgia and cervical spinal stenosis    Tobacco dependence  - smoking 1 PPD x 40 years    Medication monitoring   - CBC, CMP, sed rate, CRP q 4-6 months with potential orencia start   - TB gold negative (10/12/21)      No follow-ups on file. Electronically signed by JOSE Chaudhry CNP on 11/10/2021 at 2:49 PM    New Prescriptions    No medications on file       Thank you for allowing me to participate in the care of this patient. Please call if there are any questions.

## 2021-11-11 ENCOUNTER — TELEPHONE (OUTPATIENT)
Dept: RHEUMATOLOGY | Age: 56
End: 2021-11-11

## 2021-11-11 DIAGNOSIS — M05.79 RHEUMATOID ARTHRITIS INVOLVING MULTIPLE SITES WITH POSITIVE RHEUMATOID FACTOR (HCC): Primary | ICD-10-CM

## 2021-11-11 DIAGNOSIS — M06.30 RHEUMATOID NODULES (HCC): ICD-10-CM

## 2021-11-11 LAB — HAV AB SERPL IA-ACNC: NONREACTIVE

## 2021-11-11 RX ORDER — ETANERCEPT 50 MG/ML
50 SOLUTION SUBCUTANEOUS
Qty: 4 ML | Refills: 5 | Status: SHIPPED | OUTPATIENT
Start: 2021-11-11 | End: 2021-12-28 | Stop reason: SDUPTHER

## 2021-11-11 RX ORDER — ETANERCEPT 25 MG
50 KIT SUBCUTANEOUS ONCE
Qty: 9 EACH | Status: CANCELLED | OUTPATIENT
Start: 2021-11-11 | End: 2021-11-11

## 2021-11-11 NOTE — TELEPHONE ENCOUNTER
----- Message from Tony Villela DO sent at 11/10/2021  6:11 PM EST -----  No repeat testing for hepatitis A was negative. The preferred medication for your rheumatoid arthritis based upon your insurance will be to pursue Enbrel 50 mg subcu weekly injection. If you are okay with pursuit of this medication I will send to your local pharmacy. If you would like to discuss in more detail please let me know at your earliest convenience.

## 2021-11-15 ENCOUNTER — TELEPHONE (OUTPATIENT)
Dept: RHEUMATOLOGY | Age: 56
End: 2021-11-15

## 2021-11-15 DIAGNOSIS — M05.79 RHEUMATOID ARTHRITIS INVOLVING MULTIPLE SITES WITH POSITIVE RHEUMATOID FACTOR (HCC): ICD-10-CM

## 2021-11-15 DIAGNOSIS — M06.30 RHEUMATOID NODULES (HCC): ICD-10-CM

## 2021-12-10 NOTE — TELEPHONE ENCOUNTER
Pt called to check status of the medication prior auth.   She thought it was for Humira, but I told her there was one started for Enbrel - she said, OK because she wasn't sure what was going to be started  Please call her at [phone]

## 2021-12-16 ENCOUNTER — TELEPHONE (OUTPATIENT)
Dept: NEUROLOGY | Age: 56
End: 2021-12-16

## 2021-12-27 RX ORDER — PREDNISONE 10 MG/1
TABLET ORAL
Qty: 30 TABLET | Refills: 0 | Status: SHIPPED | OUTPATIENT
Start: 2021-12-27 | End: 2022-01-11

## 2021-12-27 NOTE — TELEPHONE ENCOUNTER
PA PENDING COVERMYMEDS   Key: [id number]      Pt  called LM wanting update on PA has been going on since 11/15 and pt and him are upset nothing been followed up on When I checked covermymeds I saw denial letter stating more info was needed and not provided so the denied PA. Renewed and info sent in correctly. Called pt back spoke w/ who was very upset it was denied.  I apologized informed him I resent everything and would make sure to keep an eye on it as I marked as urgent  And should know something in the next 24 hrs voiced understanding     Pt is having pain, achy, as well as swelling all throughout body bilateral Ondina offered prednisone taper pt agreed

## 2021-12-28 RX ORDER — ETANERCEPT 50 MG/ML
50 SOLUTION SUBCUTANEOUS
Qty: 4 ML | Refills: 5 | Status: SHIPPED | OUTPATIENT
Start: 2021-12-28 | End: 2022-05-02 | Stop reason: ALTCHOICE

## 2021-12-28 NOTE — TELEPHONE ENCOUNTER
PA APPROVED   12/27/2021 - 12/27/2022    Pt Called  No answer LM     Must be sent through [facility] speciality

## 2022-01-13 ENCOUNTER — OFFICE VISIT (OUTPATIENT)
Dept: RHEUMATOLOGY | Age: 57
End: 2022-01-13
Payer: COMMERCIAL

## 2022-01-13 VITALS
HEIGHT: 65 IN | HEART RATE: 70 BPM | OXYGEN SATURATION: 90 % | BODY MASS INDEX: 29.94 KG/M2 | SYSTOLIC BLOOD PRESSURE: 90 MMHG | DIASTOLIC BLOOD PRESSURE: 64 MMHG

## 2022-01-13 DIAGNOSIS — M54.41 CHRONIC MIDLINE LOW BACK PAIN WITH BILATERAL SCIATICA: ICD-10-CM

## 2022-01-13 DIAGNOSIS — F17.200 TOBACCO DEPENDENCE: ICD-10-CM

## 2022-01-13 DIAGNOSIS — M15.3 OTHER SECONDARY OSTEOARTHRITIS OF MULTIPLE SITES: ICD-10-CM

## 2022-01-13 DIAGNOSIS — Z51.81 MEDICATION MONITORING ENCOUNTER: ICD-10-CM

## 2022-01-13 DIAGNOSIS — G89.29 CHRONIC MIDLINE LOW BACK PAIN WITH BILATERAL SCIATICA: ICD-10-CM

## 2022-01-13 DIAGNOSIS — M54.42 CHRONIC MIDLINE LOW BACK PAIN WITH BILATERAL SCIATICA: ICD-10-CM

## 2022-01-13 DIAGNOSIS — M80.00XA OSTEOPOROSIS WITH CURRENT PATHOLOGICAL FRACTURE, UNSPECIFIED OSTEOPOROSIS TYPE, INITIAL ENCOUNTER: ICD-10-CM

## 2022-01-13 DIAGNOSIS — M06.30 RHEUMATOID NODULES (HCC): ICD-10-CM

## 2022-01-13 DIAGNOSIS — M05.79 RHEUMATOID ARTHRITIS INVOLVING MULTIPLE SITES WITH POSITIVE RHEUMATOID FACTOR (HCC): Primary | ICD-10-CM

## 2022-01-13 PROCEDURE — 4004F PT TOBACCO SCREEN RCVD TLK: CPT | Performed by: NURSE PRACTITIONER

## 2022-01-13 PROCEDURE — G8427 DOCREV CUR MEDS BY ELIG CLIN: HCPCS | Performed by: NURSE PRACTITIONER

## 2022-01-13 PROCEDURE — 96372 THER/PROPH/DIAG INJ SC/IM: CPT | Performed by: NURSE PRACTITIONER

## 2022-01-13 PROCEDURE — G8419 CALC BMI OUT NRM PARAM NOF/U: HCPCS | Performed by: NURSE PRACTITIONER

## 2022-01-13 PROCEDURE — G8484 FLU IMMUNIZE NO ADMIN: HCPCS | Performed by: NURSE PRACTITIONER

## 2022-01-13 PROCEDURE — 3017F COLORECTAL CA SCREEN DOC REV: CPT | Performed by: NURSE PRACTITIONER

## 2022-01-13 PROCEDURE — 99214 OFFICE O/P EST MOD 30 MIN: CPT | Performed by: NURSE PRACTITIONER

## 2022-01-13 RX ORDER — METHYLPREDNISOLONE ACETATE 40 MG/ML
40 INJECTION, SUSPENSION INTRA-ARTICULAR; INTRALESIONAL; INTRAMUSCULAR; SOFT TISSUE ONCE
Status: COMPLETED | OUTPATIENT
Start: 2022-01-13 | End: 2022-01-13

## 2022-01-13 RX ADMIN — METHYLPREDNISOLONE ACETATE 40 MG: 40 INJECTION, SUSPENSION INTRA-ARTICULAR; INTRALESIONAL; INTRAMUSCULAR; SOFT TISSUE at 15:18

## 2022-01-13 ASSESSMENT — ENCOUNTER SYMPTOMS
DIARRHEA: 0
EYE PAIN: 0
ABDOMINAL PAIN: 0
BACK PAIN: 0
EYE ITCHING: 0
NAUSEA: 0
TROUBLE SWALLOWING: 0
COUGH: 0
CONSTIPATION: 0
SHORTNESS OF BREATH: 0

## 2022-01-13 NOTE — PROGRESS NOTES
[facility] RHEUMATOLOGY FOLLOW UP NOTE       Date Of Service: 1/13/2022  Provider: JOSE Jiang - CNP    Name: Linda Trejo   MRN: [id number]    Chief Complaint(s)     Chief Complaint   Patient presents with    Follow-up     2 month         History of Present Illness (HPI)     Linda Trejo  is a(n)56 y.o. female with a hx of chronic low back pain, osteoporosis, rheumatoid arthritis, osteoarthritis of multiple joints, chronic tobacco dependence, osteoarthritis bilat hips s/p bilat THR here for the f/u evaluation of rheumatoid arthritis. Interval hx:    - has not started enbrel yet- PA approved, did not hear from pharmacy    pain affecting the back, toes  Pain on a scale 0-10: 7.5/10  Type of pain: constant hands, low back  Timing: mornings  Aggravating factors: cold exposure, weather changes, left ankle: wt bearing  Alleviating factors: norco, warmth, corticosteroid    Associated symptoms:  denies swelling/  Redness/ warmth, denies AM stiffness        REVIEW OF SYSTEMS: (ROS)    Review of Systems   Constitutional: Negative for fatigue, fever and unexpected weight change. HENT: Negative for congestion and trouble swallowing. Eyes: Negative for pain and itching. Respiratory: Negative for cough and shortness of breath. Cardiovascular: Negative for chest pain and leg swelling. Gastrointestinal: Negative for abdominal pain, constipation, diarrhea and nausea. Endocrine: Negative for cold intolerance and heat intolerance. Genitourinary: Negative for difficulty urinating, frequency and urgency. Musculoskeletal: Positive for arthralgias. Negative for back pain and joint swelling. Skin: Negative for rash. Neurological: Negative for dizziness, weakness, numbness and headaches. Psychiatric/Behavioral: The patient is not nervous/anxious.         PAST MEDICAL HISTORY      Past Medical History:   Diagnosis Date    Anemia     Arthritis     rheumatoid    Asthma     DJD (degenerative joint disease)     GERD (gastroesophageal reflux disease)     History of bleeding ulcers     Rheumatoid arthritis(714.0)        PAST SURGICAL HISTORY      Past Surgical History:   Procedure Laterality Date    CHOLECYSTECTOMY      COLONOSCOPY  11/08/2010    1CM RECURRENT SESSILE POLYP REMOVED AND CAUTERIZED    COLONOSCOPY  11/08/2010    5MM POLYP AT 20CM FROM ANAL VERGE  REMOVED AND BIOPSIED WITH COLD BIOPSY FORCEPS    HYSTERECTOMY, TOTAL ABDOMINAL  04/12/2011    JOINT REPLACEMENT Bilateral 05/05/2014    Dr Bel Overton  10/19/2015    removal of L4/L5 hardware and fusion of L3/L4 lumbar spine by Dr. Shannon Pina Right 08/18/2011    SIJ    OTHER SURGICAL HISTORY  12/11/2012    caudal epidural    OTHER SURGICAL HISTORY Right 05/23/2013     L4 TFE    OTHER SURGICAL HISTORY Right 01/23/2014    HIP INJECTION    SPINE SURGERY  2010    Fusion and cage        FAMILY HISTORY      Family History   Problem Relation Age of Onset    Heart Disease Paternal Grandmother     High Blood Pressure Paternal Grandmother     Heart Disease Paternal Grandfather     High Blood Pressure Paternal Grandfather     Heart Disease Father     High Blood Pressure Father     Arthritis Father     Rheum Arthritis Father     Stroke Maternal Grandmother     Diabetes Maternal Grandmother     Stroke Maternal Grandfather     Diabetes Maternal Grandfather     Arthritis Daughter     Rheum Arthritis Mother        SOCIAL HISTORY      Social History     Tobacco History     Smoking Status  Current Every Day Smoker Smoking Start Date  1/1/1980 Smoking Frequency  1 pack/day for 40 years (36 pk yrs) Smoking Tobacco Type  Cigarettes    Smokeless Tobacco Use  Never Used          Alcohol History     Alcohol Use Status  No          Drug Use     Drug Use Status  No          Sexual Activity     Sexually Active  Not Currently Partners  Male                ALLERGIES     Allergies   Allergen Reactions    Avelox [Moxifloxacin]      Rash and edema    Bactrim [Sulfamethoxazole-Trimethoprim]      hives    Cefuroxime Axetil Nausea Only    Codeine      Nausea and rash    Sulfa Antibiotics Hives    Xeljanz [Tofacitinib] Diarrhea     vomit       CURRENT MEDICATIONS      Current Outpatient Medications   Medication Sig Dispense Refill    Etanercept (ENBREL MINI) 50 MG/ML SOCT Inject 50 mg into the skin every 7 days 4 mL 5    Misc. Devices (WHEELCHAIR CUSHION) MISC Wheelchair cushion 1 each 0    DULoxetine (CYMBALTA) 30 MG extended release capsule duloxetine 30 mg capsule,delayed release      HYDROcodone-acetaminophen (NORCO)  MG per tablet Take 1 tablet by mouth every 6 hours as needed for Pain.  montelukast (SINGULAIR) 10 MG tablet TAKE 1 TABLET BY MOUTH ONE TIME A DAY 90 tablet 3    metoprolol tartrate (LOPRESSOR) 50 MG tablet TAKE ONE-HALF TABLET BY MOUTH TWICE A DAY 30 tablet 0    Elastic Bandages & Supports (MEDICAL COMPRESSION STOCKINGS) MISC 1 each by NOT APPLICABLE route daily bilateral      diclofenac sodium 1 % GEL Apply topically nightly      Lift Chair MISC by Does not apply route 1 each 0    traZODone (DESYREL) 100 MG tablet Take 1 tablet by mouth nightly 90 tablet 1    BD PEN NEEDLE JEFF U/F 32G X 4 MM MISC       Lancets Mis. (ACCU-CHEK FASTCLIX LANCET) KIT       melatonin 5 MG TBDP disintegrating tablet Take 10 mg by mouth nightly      diphenhydrAMINE HCl (BENADRYL ALLERGY PO) Take 25 mg by mouth nightly      esomeprazole (NEXIUM) 40 MG delayed release capsule TAKE 1 CAPSULE BY MOUTH 2 TIMES A DAY 60 capsule 5    ondansetron (ZOFRAN ODT) 4 MG disintegrating tablet Take 1 tablet by mouth every 8 hours as needed for Nausea or Vomiting 30 tablet 2    lidocaine (LIDODERM) 5 % Place 1 patch onto the skin daily 12 hours on, 12 hours off.  30 patch 5    Compression Stockings MISC by Does not apply route Needs 20-30mmHg 6 each 0    calcium carbonate (OSCAL) 500 MG TABS tablet Take 500 mg by mouth daily      Loratadine 10 MG CAPS Take by mouth      Cholecalciferol (VITAMIN D) 2000 UNITS CAPS capsule Take 2,000 Units by mouth 2 times daily      predniSONE (DELTASONE) 5 MG tablet Take 5 mg by mouth daily       hydroxychloroquine (PLAQUENIL) 200 MG tablet Take by mouth One every morning and every night      blood glucose test strips (EXACTECH TEST) strip 1 strip by Other route 4 times daily       Current Facility-Administered Medications   Medication Dose Route Frequency Provider Last Rate Last Admin    methylPREDNISolone acetate (DEPO-MEDROL) injection 40 mg  40 mg IntraMUSCular Once JOSE Ballesteros - JEREMIAH           PHYSICAL EXAMINATION / OBJECTIVE   Objective:  BP 90/64 (Site: Left Upper Arm, Position: Sitting, Cuff Size: Medium Adult)   Pulse 70   Ht 5' 5\" (1.651 m)   LMP 04/12/2012   SpO2 90%   BMI 29.94 kg/m²     Physical Exam  Vitals reviewed. Constitutional:       Appearance: She is well-developed. Cardiovascular:      Rate and Rhythm: Normal rate and regular rhythm. Pulmonary:      Effort: Pulmonary effort is normal.      Breath sounds: Normal breath sounds. Abdominal:      Palpations: Abdomen is soft. Tenderness: There is no abdominal tenderness. Musculoskeletal:      Cervical back: Normal range of motion and neck supple. Skin:     General: Skin is warm and dry. Findings: No rash. Neurological:      Mental Status: She is alert and oriented to person, place, and time. Deep Tendon Reflexes: Reflexes are normal and symmetric. Psychiatric:         Thought Content:  Thought content normal.       Upper extremities:    SHOULDERS tender bilat  ELBOWS tender bilat,   WRISTS tender and swelling bilat, limited ROM,   HANDS/FINGERS     MCPs nontender, no swelling    PIPs nontender, + bogginess bilat   + swan neck changes, distal left 2nd finger amputation    Lower extremities:  HIPS tender bilat outer hips  KNEES tender bilat, no swelling  ANKLES tender bilat FEET : tender and swelling bilat MTPs, + lateral deviation of toes. + bunions        LABS    CBC  Lab Results   Component Value Date    WBC 5.1 11/10/2021    RBC 4.69 11/10/2021    HGB 13.2 11/10/2021    HCT 42.4 11/10/2021    MCV 90.4 11/10/2021    MCH 28.1 11/10/2021    MCHC 31.1 11/10/2021    RDW 16.0 03/12/2020     11/10/2021       CMP  Lab Results   Component Value Date    CALCIUM 8.8 11/10/2021    LABALBU 3.3 11/10/2021    PROT 6.7 11/10/2021     11/10/2021    K 4.2 11/10/2021    CO2 27 11/10/2021     11/10/2021    BUN 10 11/10/2021    CREATININE 0.5 11/10/2021    ALKPHOS 75 11/10/2021    ALT 10 11/10/2021    AST 15 11/10/2021       HgBA1c: No components found for: HGBA1C    Lab Results   Component Value Date    VITD25 103.5 10/12/2021         No results found for: ANASCRN  No results found for: SSA  No results found for: SSB  No results found for: ANTI-SMITH  No results found for: DSDNAAB   No results found for: ANTIRNP  No results found for: C3, C4  No results found for: CCPAB  Lab Results   Component Value Date    RF >360 10/12/2021       No components found for: CANCASCRN, APANCASCRN  Lab Results   Component Value Date    SEDRATE 75 (H) 11/10/2021     Lab Results   Component Value Date    CRP 0.73 11/10/2021       RADIOLOGY:         ASSESSMENT/PLAN    Assessment   Plan     Seropositive rheumatoid arthritis +CCP >300, RF >360  Rheumatoid nodulosis  - diagnosed in 30's  - prior tx: methotrexate PO (nausea), subcu (LFT elevation), xeljanz (diarrhea and vomiting), humira (nausea), arava (hair loss)     - plaquenil 200 mg BID    - prednisone 5 mg daily   - enbrel 50 mg subcu weekly- sample given today- pharmacy called to set up delivery   - depomedrol 40 mg IM today    Osteoarthritis of multiple joints  - likely 2/2 to #1    Chronic use of systemic steroids  Osteoporosis with current fracture  - on steroids for past 20 years.  DEXA 12/19/2019 w/ T score -3.6 right forearm   - prolia 60 mg subcu q 6 months     Chronic midline low back pain with bilateral sciatica  Scoliosis  H/o cervicalgia and cervical spinal stenosis    Tobacco dependence  - smoking 1 PPD x 40 years    Medication monitoring   - CBC, CMP, sed rate, CRP q 4-6 months    - TB gold negative (10/12/21)      No follow-ups on file. Electronically signed by JOSE Pickens CNP on 1/13/2022 at 3:11 PM    New Prescriptions    No medications on file       Thank you for allowing me to participate in the care of this patient. Please call if there are any questions.

## 2022-01-19 ENCOUNTER — TELEPHONE (OUTPATIENT)
Dept: RHEUMATOLOGY | Age: 57
End: 2022-01-19

## 2022-01-19 NOTE — TELEPHONE ENCOUNTER
[facility] has not received her enbrel at this time. Due Friday, is it ok to give sample?

## 2022-01-20 ENCOUNTER — TELEPHONE (OUTPATIENT)
Dept: RHEUMATOLOGY | Age: 57
End: 2022-01-20

## 2022-01-20 NOTE — TELEPHONE ENCOUNTER
Spoke to Yandel Alves. Told her sample was available. States her  has phoned pharmacy and it is out for delivery but they do not know when to expect it.

## 2022-04-27 ENCOUNTER — TELEPHONE (OUTPATIENT)
Dept: RHEUMATOLOGY | Age: 57
End: 2022-04-27

## 2022-04-27 NOTE — TELEPHONE ENCOUNTER
Hello,    Just a reminder for your upcoming appointment; our office address has changed. The new location is [facility], [address] [place] [address]    See you soon!      Dr Bedoya Courts, and staff     Attempted to call her LM informing of above

## 2022-05-02 ENCOUNTER — OFFICE VISIT (OUTPATIENT)
Dept: RHEUMATOLOGY | Age: 57
End: 2022-05-02
Payer: COMMERCIAL

## 2022-05-02 VITALS
WEIGHT: 179 LBS | BODY MASS INDEX: 29.82 KG/M2 | DIASTOLIC BLOOD PRESSURE: 64 MMHG | OXYGEN SATURATION: 96 % | HEIGHT: 65 IN | HEART RATE: 79 BPM | SYSTOLIC BLOOD PRESSURE: 102 MMHG

## 2022-05-02 DIAGNOSIS — M54.42 CHRONIC MIDLINE LOW BACK PAIN WITH BILATERAL SCIATICA: ICD-10-CM

## 2022-05-02 DIAGNOSIS — M06.30 RHEUMATOID NODULES (HCC): ICD-10-CM

## 2022-05-02 DIAGNOSIS — F17.200 TOBACCO DEPENDENCE: ICD-10-CM

## 2022-05-02 DIAGNOSIS — M54.41 CHRONIC MIDLINE LOW BACK PAIN WITH BILATERAL SCIATICA: ICD-10-CM

## 2022-05-02 DIAGNOSIS — Z51.81 MEDICATION MONITORING ENCOUNTER: ICD-10-CM

## 2022-05-02 DIAGNOSIS — M05.79 RHEUMATOID ARTHRITIS INVOLVING MULTIPLE SITES WITH POSITIVE RHEUMATOID FACTOR (HCC): Primary | ICD-10-CM

## 2022-05-02 DIAGNOSIS — M80.00XA OSTEOPOROSIS WITH CURRENT PATHOLOGICAL FRACTURE, UNSPECIFIED OSTEOPOROSIS TYPE, INITIAL ENCOUNTER: ICD-10-CM

## 2022-05-02 DIAGNOSIS — G89.29 CHRONIC MIDLINE LOW BACK PAIN WITH BILATERAL SCIATICA: ICD-10-CM

## 2022-05-02 PROCEDURE — 4004F PT TOBACCO SCREEN RCVD TLK: CPT | Performed by: INTERNAL MEDICINE

## 2022-05-02 PROCEDURE — 99215 OFFICE O/P EST HI 40 MIN: CPT | Performed by: INTERNAL MEDICINE

## 2022-05-02 PROCEDURE — 96372 THER/PROPH/DIAG INJ SC/IM: CPT | Performed by: INTERNAL MEDICINE

## 2022-05-02 PROCEDURE — G8419 CALC BMI OUT NRM PARAM NOF/U: HCPCS | Performed by: INTERNAL MEDICINE

## 2022-05-02 PROCEDURE — G8428 CUR MEDS NOT DOCUMENT: HCPCS | Performed by: INTERNAL MEDICINE

## 2022-05-02 PROCEDURE — 3017F COLORECTAL CA SCREEN DOC REV: CPT | Performed by: INTERNAL MEDICINE

## 2022-05-02 RX ORDER — TRIAMCINOLONE ACETONIDE 40 MG/ML
40 INJECTION, SUSPENSION INTRA-ARTICULAR; INTRAMUSCULAR ONCE
Status: COMPLETED | OUTPATIENT
Start: 2022-05-02 | End: 2022-05-02

## 2022-05-02 RX ORDER — PREDNISONE 2.5 MG
7.5 TABLET ORAL DAILY
Qty: 90 TABLET | Refills: 1 | Status: SHIPPED | OUTPATIENT
Start: 2022-05-02 | End: 2022-05-31 | Stop reason: SDUPTHER

## 2022-05-02 RX ORDER — HYDROXYCHLOROQUINE SULFATE 200 MG/1
200 TABLET, FILM COATED ORAL 2 TIMES DAILY
Qty: 60 TABLET | Refills: 0 | Status: SHIPPED | OUTPATIENT
Start: 2022-05-02 | End: 2022-06-09

## 2022-05-02 RX ADMIN — TRIAMCINOLONE ACETONIDE 40 MG: 40 INJECTION, SUSPENSION INTRA-ARTICULAR; INTRAMUSCULAR at 15:17

## 2022-05-02 ASSESSMENT — ENCOUNTER SYMPTOMS
BACK PAIN: 0
CONSTIPATION: 1
EYE PAIN: 0
DIARRHEA: 0
TROUBLE SWALLOWING: 0
ABDOMINAL PAIN: 0
NAUSEA: 1
COUGH: 0
VOMITING: 1
EYE ITCHING: 0
SHORTNESS OF BREATH: 0

## 2022-05-02 NOTE — PROGRESS NOTES
[facility] RHEUMATOLOGY FOLLOW UP NOTE       Date Of Service: 5/2/2022  Provider: Britney Hayden DO    Name: Sona Atkins   MRN: [id number]    Chief Complaint(s)     Chief Complaint   Patient presents with    Follow-up     4 month         History of Present Illness (HPI)     Sona Atkins  is a(n)56 y.o. female with a hx of chronic low back pain, osteoporosis, rheumatoid arthritis, osteoarthritis of multiple joints, chronic tobacco dependence, osteoarthritis bilat hips s/p bilat THR here for the f/u evaluation of rheumatoid arthritis. Enbrel stopped. -- Reported generalized rash, vomiting , vision changes developed the day after then 5th enbrel injection. Reported increased generalized swelling medication     Rheumatoid arthritis - active flares. With continued joint pain and swelling. Constant pain in the  Right hand at this time. Recent injury of the right hand sat or Sunday of this past weekend. Type of pain: constant hands, low back  Timing: mornings  Aggravating factors: cold exposure, weather changes, left ankle: wt bearing. Weather changes. Alleviating factors: norco, warmth, corticosteroid  Numbness bilateral toes. Smoking 1ppd       REVIEW OF SYSTEMS: (ROS)    Review of Systems   Constitutional: Negative for fatigue, fever and unexpected weight change. HENT: Negative for congestion and trouble swallowing. Eyes: Negative for pain and itching. Respiratory: Negative for cough and shortness of breath. Cardiovascular: Negative for chest pain and leg swelling. Gastrointestinal: Positive for constipation, nausea and vomiting. Negative for abdominal pain and diarrhea. Endocrine: Negative for cold intolerance and heat intolerance. Genitourinary: Negative for difficulty urinating, frequency and urgency. Musculoskeletal: Positive for arthralgias. Negative for back pain and joint swelling. Skin: Negative for rash. Neurological: Positive for numbness.  Negative for dizziness, weakness and headaches. Psychiatric/Behavioral: The patient is not nervous/anxious.         PAST MEDICAL HISTORY      Past Medical History:   Diagnosis Date    Anemia     Arthritis     rheumatoid    Asthma     DJD (degenerative joint disease)     GERD (gastroesophageal reflux disease)     History of bleeding ulcers     Rheumatoid arthritis(714.0)        PAST SURGICAL HISTORY      Past Surgical History:   Procedure Laterality Date    CHOLECYSTECTOMY      COLONOSCOPY  11/08/2010    1CM RECURRENT SESSILE POLYP REMOVED AND CAUTERIZED    COLONOSCOPY  11/08/2010    5MM POLYP AT 20CM FROM ANAL VERGE  REMOVED AND BIOPSIED WITH COLD BIOPSY FORCEPS    HYSTERECTOMY, TOTAL ABDOMINAL  04/12/2011    JOINT REPLACEMENT Bilateral 05/05/2014    Dr Mariza Aguirre  10/19/2015    removal of L4/L5 hardware and fusion of L3/L4 lumbar spine by Dr. Marla Keyes Right 08/18/2011    SIJ    OTHER SURGICAL HISTORY  12/11/2012    caudal epidural    OTHER SURGICAL HISTORY Right 05/23/2013     L4 TFE    OTHER SURGICAL HISTORY Right 01/23/2014    HIP INJECTION    SPINE SURGERY  2010    Fusion and cage        FAMILY HISTORY      Family History   Problem Relation Age of Onset    Heart Disease Paternal Grandmother     High Blood Pressure Paternal Grandmother     Heart Disease Paternal Grandfather     High Blood Pressure Paternal Grandfather     Heart Disease Father     High Blood Pressure Father     Arthritis Father     Rheum Arthritis Father     Stroke Maternal Grandmother     Diabetes Maternal Grandmother     Stroke Maternal Grandfather     Diabetes Maternal Grandfather     Arthritis Daughter     Rheum Arthritis Mother        SOCIAL HISTORY      Social History     Tobacco History     Smoking Status  Current Every Day Smoker Smoking Start Date  1/1/1980 Smoking Frequency  1 pack/day for 40 years (36 pk yrs) Smoking Tobacco Type  Cigarettes    Smokeless Tobacco Use  Never Used          Alcohol History     Alcohol Use Status  No          Drug Use     Drug Use Status  No          Sexual Activity     Sexually Active  Not Currently Partners  Male                ALLERGIES     Allergies   Allergen Reactions    Avelox [Moxifloxacin]      Rash and edema    Bactrim [Sulfamethoxazole-Trimethoprim]      hives    Cefuroxime Axetil Nausea Only    Codeine      Nausea and rash    Sulfa Antibiotics Hives    Xeljanz [Tofacitinib] Diarrhea     vomit       CURRENT MEDICATIONS      Current Outpatient Medications   Medication Sig Dispense Refill    Misc. Devices (WHEELCHAIR CUSHION) MISC Wheelchair cushion 1 each 0    DULoxetine (CYMBALTA) 30 MG extended release capsule duloxetine 30 mg capsule,delayed release      HYDROcodone-acetaminophen (NORCO)  MG per tablet Take 1 tablet by mouth every 6 hours as needed for Pain.  montelukast (SINGULAIR) 10 MG tablet TAKE 1 TABLET BY MOUTH ONE TIME A DAY 90 tablet 3    metoprolol tartrate (LOPRESSOR) 50 MG tablet TAKE ONE-HALF TABLET BY MOUTH TWICE A DAY 30 tablet 0    Elastic Bandages & Supports (MEDICAL COMPRESSION STOCKINGS) MISC 1 each by NOT APPLICABLE route daily bilateral      diclofenac sodium 1 % GEL Apply topically nightly      Lift Chair MISC by Does not apply route 1 each 0    traZODone (DESYREL) 100 MG tablet Take 1 tablet by mouth nightly 90 tablet 1    BD PEN NEEDLE JEFF U/F 32G X 4 MM MISC       Lancets Misc. (ACCU-CHEK FASTCLIX LANCET) KIT       melatonin 5 MG TBDP disintegrating tablet Take 10 mg by mouth nightly      diphenhydrAMINE HCl (BENADRYL ALLERGY PO) Take 25 mg by mouth nightly      esomeprazole (NEXIUM) 40 MG delayed release capsule TAKE 1 CAPSULE BY MOUTH 2 TIMES A DAY 60 capsule 5    ondansetron (ZOFRAN ODT) 4 MG disintegrating tablet Take 1 tablet by mouth every 8 hours as needed for Nausea or Vomiting 30 tablet 2    lidocaine (LIDODERM) 5 % Place 1 patch onto the skin daily 12 hours on, 12 hours off.  30 patch 5    Compression Stockings MISC by Does not apply route Needs 20-30mmHg 6 each 0    calcium carbonate (OSCAL) 500 MG TABS tablet Take 500 mg by mouth daily      Loratadine 10 MG CAPS Take by mouth      Cholecalciferol (VITAMIN D) 2000 UNITS CAPS capsule Take 2,000 Units by mouth 2 times daily      predniSONE (DELTASONE) 5 MG tablet Take 5 mg by mouth daily       hydroxychloroquine (PLAQUENIL) 200 MG tablet Take by mouth One every morning and every night      Etanercept (ENBREL MINI) 50 MG/ML SOCT Inject 50 mg into the skin every 7 days (Patient not taking: Reported on 5/2/2022) 4 mL 5    blood glucose test strips (EXACTECH TEST) strip 1 strip by Other route 4 times daily       No current facility-administered medications for this visit. PHYSICAL EXAMINATION / OBJECTIVE   Objective:  /64 (Site: Left Upper Arm, Position: Sitting, Cuff Size: Medium Adult)   Pulse 79   Ht 5' 5\" (1.651 m)   Wt 179 lb (81.2 kg)   LMP 04/12/2012   SpO2 96%   BMI 29.79 kg/m²     Physical Exam  Vitals reviewed. Constitutional:       Appearance: She is well-developed. Cardiovascular:      Rate and Rhythm: Normal rate and regular rhythm. Pulmonary:      Effort: Pulmonary effort is normal.      Breath sounds: Normal breath sounds. Abdominal:      Palpations: Abdomen is soft. Tenderness: There is no abdominal tenderness. Musculoskeletal:      Cervical back: Normal range of motion and neck supple. Skin:     General: Skin is warm and dry. Findings: No rash. Neurological:      Mental Status: She is alert and oriented to person, place, and time. Deep Tendon Reflexes: Reflexes are normal and symmetric. Psychiatric:         Thought Content:  Thought content normal.       Upper extremities:    SHOULDERS tender bilat  ELBOWS tender bilat,   WRISTS tender and swelling bilat, limited ROM,   HANDS/FINGERS --- distal left 2nd finger amputation     MCPs - swelling/warmth right MCP   PIPs: + swan neck changes, mild swelling bilat pips. Lower extremities:  HIPS tender bilat outer hips  KNEES tender bilat, no swelling  ANKLES tender bilat , + tinel bilat   FEET : tender and swelling bilat MTPs, + lateral deviation of toes. + bunions        LABS    CBC  Lab Results   Component Value Date    WBC 5.1 11/10/2021    RBC 4.69 11/10/2021    HGB 13.2 11/10/2021    HCT 42.4 11/10/2021    MCV 90.4 11/10/2021    MCH 28.1 11/10/2021    MCHC 31.1 11/10/2021    RDW 16.0 03/12/2020     11/10/2021       CMP  Lab Results   Component Value Date    CALCIUM 8.8 11/10/2021    LABALBU 3.3 11/10/2021    PROT 6.7 11/10/2021     11/10/2021    K 4.2 11/10/2021    CO2 27 11/10/2021     11/10/2021    BUN 10 11/10/2021    CREATININE 0.5 11/10/2021    ALKPHOS 75 11/10/2021    ALT 10 11/10/2021    AST 15 11/10/2021       HgBA1c: No components found for: HGBA1C    Lab Results   Component Value Date    VITD25 103.5 10/12/2021       Lab Results   Component Value Date    RF >360 10/12/2021       No components found for: CANCASCRN, APANCASCRN  Lab Results   Component Value Date    SEDRATE 75 (H) 11/10/2021     Lab Results   Component Value Date    CRP 0.73 11/10/2021       RADIOLOGY:         ASSESSMENT/PLAN    Assessment   Plan     Seropositive rheumatoid arthritis +CCP >300, RF >360  Rheumatoid nodulosis  - diagnosed in 30's  - prior tx: methotrexate PO (nausea), subcu (LFT elevation), xeljanz (diarrhea and vomiting), humira (nausea), arava (hair loss)  Humira -- (nausea), enbrel Feb to march 2022 - rash, nasuea, inflammatory arthritis. - plaquenil 200 mg BID   - increase prednisone 7.5 mg daily b/c inflammatory arthritis. - discussed orencia vs rituxan given the + serologies    - patient would like to pursue rituximab q 6 months.  Rituximab  Have discussed with the pt some of the potential risk assocaited with Rituxan which included but not limited to increased risk of infection, MAX virus/PML(risk 1/30,000) , Claire Vidal, hepatic failure, acute flares of Hepatitis B,  pulmonary toxicity, nephrotoxicity, lymphopenia, nausea vomiting, rash/each carrier, neutropenia, arthralgia/myalgia, anemia, retention, anxiety, hyperuricemia  - prior to starting needs  hepatitis B , TB evaluation -- ### [facility] [place]. Osteoarthritis of multiple joints- likely 2/2 to #1    Chronic use of systemic steroids  Osteoporosis with current fracture  - on steroids for past 20 years. DEXA 12/19/2019 w/ T score -3.6 right forearm   - prolia 60 mg subcu q 6 months     Chronic midline low back pain with bilateral sciatica  Scoliosis  H/o cervicalgia and cervical spinal stenosis    Tobacco dependence  - smoking 1 PPD x 40 years  - pft ordered. Medication monitoring   - CBC, CMP, sed rate, CRP q 4-6 months    - TB gold negative (10/12/21)      Return in about 3 months (around 8/2/2022). Electronically signed by Mariza Harding,  on 5/2/2022 at 2:01 PM    New Prescriptions    No medications on file       Thank you for allowing me to participate in the care of this patient. Please call if there are any questions.

## 2022-05-03 ENCOUNTER — TELEPHONE (OUTPATIENT)
Dept: RHEUMATOLOGY | Age: 57
End: 2022-05-03

## 2022-05-03 DIAGNOSIS — M80.00XA OSTEOPOROSIS WITH CURRENT PATHOLOGICAL FRACTURE, UNSPECIFIED OSTEOPOROSIS TYPE, INITIAL ENCOUNTER: ICD-10-CM

## 2022-05-09 ENCOUNTER — TELEPHONE (OUTPATIENT)
Dept: RHEUMATOLOGY | Age: 57
End: 2022-05-09

## 2022-05-09 NOTE — TELEPHONE ENCOUNTER
Clarified with pharmacy that medication should be sent to our office and they gave a delivery date of 5/17/22.

## 2022-05-09 NOTE — TELEPHONE ENCOUNTER
Ilir Villela called from [facility] is  calling for clarification on a pts prolia injection    she said the patient told them it's supposed to be shipped to the patient and the pharmacy wanted to make sure that this was correct since the prolia injections are typically sent to the provider or a facility    # [phone]

## 2022-05-10 ENCOUNTER — TELEPHONE (OUTPATIENT)
Dept: RHEUMATOLOGY | Age: 57
End: 2022-05-10

## 2022-05-10 NOTE — TELEPHONE ENCOUNTER
[facility] called and stated that the pt's insurance wont cover rituxan but they will cover ruxience. Please advise. New order will need faxed to [phone].

## 2022-05-27 NOTE — TELEPHONE ENCOUNTER
called wanting to get pt scheduled for prolia call him back no answer LM on  phone, also called pt number no answer LM

## 2022-05-31 ENCOUNTER — NURSE ONLY (OUTPATIENT)
Dept: RHEUMATOLOGY | Age: 57
End: 2022-05-31
Payer: COMMERCIAL

## 2022-05-31 ENCOUNTER — TELEPHONE (OUTPATIENT)
Dept: RHEUMATOLOGY | Age: 57
End: 2022-05-31

## 2022-05-31 DIAGNOSIS — M80.00XA OSTEOPOROSIS WITH CURRENT PATHOLOGICAL FRACTURE, UNSPECIFIED OSTEOPOROSIS TYPE, INITIAL ENCOUNTER: Primary | ICD-10-CM

## 2022-05-31 DIAGNOSIS — M05.79 RHEUMATOID ARTHRITIS INVOLVING MULTIPLE SITES WITH POSITIVE RHEUMATOID FACTOR (HCC): ICD-10-CM

## 2022-05-31 PROCEDURE — 99211 OFF/OP EST MAY X REQ PHY/QHP: CPT | Performed by: NURSE PRACTITIONER

## 2022-05-31 RX ORDER — PREDNISONE 2.5 MG
7.5 TABLET ORAL DAILY
Qty: 90 TABLET | Refills: 0 | Status: SHIPPED | OUTPATIENT
Start: 2022-05-31 | End: 2022-07-19

## 2022-05-31 NOTE — TELEPHONE ENCOUNTER
Nikki in today for prolia injecton. States that she is still considering the infusions. Also states that she is to have back surgery in august 2022 per  [facility]. Need script for prednisone. States felt good while she was on 7.5mg prednisone.

## 2022-06-09 DIAGNOSIS — M05.79 RHEUMATOID ARTHRITIS INVOLVING MULTIPLE SITES WITH POSITIVE RHEUMATOID FACTOR (HCC): ICD-10-CM

## 2022-06-09 RX ORDER — HYDROXYCHLOROQUINE SULFATE 200 MG/1
TABLET, FILM COATED ORAL
Qty: 60 TABLET | Refills: 0 | Status: SHIPPED | OUTPATIENT
Start: 2022-06-09 | End: 2022-07-19

## 2022-06-23 ENCOUNTER — OFFICE VISIT (OUTPATIENT)
Dept: RHEUMATOLOGY | Age: 57
End: 2022-06-23
Payer: COMMERCIAL

## 2022-06-23 VITALS
DIASTOLIC BLOOD PRESSURE: 86 MMHG | SYSTOLIC BLOOD PRESSURE: 138 MMHG | HEART RATE: 86 BPM | OXYGEN SATURATION: 98 % | BODY MASS INDEX: 29.82 KG/M2 | WEIGHT: 179 LBS | HEIGHT: 65 IN

## 2022-06-23 DIAGNOSIS — M05.79 RHEUMATOID ARTHRITIS INVOLVING MULTIPLE SITES WITH POSITIVE RHEUMATOID FACTOR (HCC): Primary | ICD-10-CM

## 2022-06-23 DIAGNOSIS — G89.29 CHRONIC MIDLINE LOW BACK PAIN WITH BILATERAL SCIATICA: ICD-10-CM

## 2022-06-23 DIAGNOSIS — F17.200 TOBACCO DEPENDENCE: ICD-10-CM

## 2022-06-23 DIAGNOSIS — M54.41 CHRONIC MIDLINE LOW BACK PAIN WITH BILATERAL SCIATICA: ICD-10-CM

## 2022-06-23 DIAGNOSIS — M54.42 CHRONIC MIDLINE LOW BACK PAIN WITH BILATERAL SCIATICA: ICD-10-CM

## 2022-06-23 DIAGNOSIS — M80.00XA OSTEOPOROSIS WITH CURRENT PATHOLOGICAL FRACTURE, UNSPECIFIED OSTEOPOROSIS TYPE, INITIAL ENCOUNTER: ICD-10-CM

## 2022-06-23 DIAGNOSIS — Z51.81 MEDICATION MONITORING ENCOUNTER: ICD-10-CM

## 2022-06-23 DIAGNOSIS — M06.30 RHEUMATOID NODULES (HCC): ICD-10-CM

## 2022-06-23 PROCEDURE — 99214 OFFICE O/P EST MOD 30 MIN: CPT | Performed by: INTERNAL MEDICINE

## 2022-06-23 PROCEDURE — 3017F COLORECTAL CA SCREEN DOC REV: CPT | Performed by: INTERNAL MEDICINE

## 2022-06-23 PROCEDURE — 4004F PT TOBACCO SCREEN RCVD TLK: CPT | Performed by: INTERNAL MEDICINE

## 2022-06-23 PROCEDURE — G8427 DOCREV CUR MEDS BY ELIG CLIN: HCPCS | Performed by: INTERNAL MEDICINE

## 2022-06-23 PROCEDURE — G8419 CALC BMI OUT NRM PARAM NOF/U: HCPCS | Performed by: INTERNAL MEDICINE

## 2022-06-23 RX ORDER — PREDNISONE 10 MG/1
TABLET ORAL
Qty: 30 TABLET | Refills: 0 | Status: SHIPPED | OUTPATIENT
Start: 2022-06-23 | End: 2022-07-08

## 2022-06-23 ASSESSMENT — ENCOUNTER SYMPTOMS
BACK PAIN: 0
EYE ITCHING: 0
SHORTNESS OF BREATH: 0
VOMITING: 1
NAUSEA: 1
ABDOMINAL PAIN: 0
COUGH: 0
CONSTIPATION: 1
EYE PAIN: 0
DIARRHEA: 0
TROUBLE SWALLOWING: 0

## 2022-06-23 NOTE — LETTER
[address] [facility]  [address] [facility]  Phone: [phone]  Fax: Derik [address], DO        June 23, 2022     Patient: Torsten Toledo   YOB: 1965   Date of Visit: 6/23/2022       To Whom It May Concern:    Jose Keenan brought his wife Cam Cuba to her appointment today at 3pm.    If you have any questions or concerns, please don't hesitate to call.     Sincerely,        Juan R ZHOU, DO

## 2022-06-23 NOTE — PROGRESS NOTES
[facility] RHEUMATOLOGY FOLLOW UP NOTE       Date Of Service: 6/23/2022  Provider: Alex Velasquez DO    Name: Lakshmi Hoyos   MRN: [id number]    Chief Complaint(s)     Chief Complaint   Patient presents with    Follow-up     1 month f/u RA        History of Present Illness (HPI)     Lakshmi Hoyos  is a(n)56 y.o. female with a hx of chronic low back pain, osteoporosis, rheumatoid arthritis, osteoarthritis of multiple joints, chronic tobacco dependence, osteoarthritis bilat hips s/p bilat THR here for the f/u evaluation of rheumatoid arthritis. Rheumatoid arthritis -- ruxience (rituximab) - not started  Given patient concerns   Pain of the bilat arms (shoulders, elbows, wrists, hands), left ankle. Pain constant variable pain up to 8/10. Timing:Timing: mornings  Aggravating factors: cold exposure,  left ankle: wt bearing. Weather changes. Alleviating factors: norco, warmth, corticosteroid  tingling bilateral toes. Tobacco --- Smoking 1ppd       REVIEW OF SYSTEMS: (ROS)    Review of Systems   Constitutional: Negative for fatigue, fever and unexpected weight change. HENT: Negative for congestion and trouble swallowing. Eyes: Negative for pain and itching. Respiratory: Negative for cough and shortness of breath. Cardiovascular: Negative for chest pain and leg swelling. Gastrointestinal: Positive for constipation, nausea and vomiting. Negative for abdominal pain and diarrhea. Endocrine: Negative for cold intolerance and heat intolerance. Genitourinary: Negative for difficulty urinating, frequency and urgency. Musculoskeletal: Positive for arthralgias. Negative for back pain and joint swelling. Skin: Negative for rash. Neurological: Positive for numbness. Negative for dizziness, weakness and headaches. Psychiatric/Behavioral: The patient is not nervous/anxious.         PAST MEDICAL HISTORY      Past Medical History:   Diagnosis Date    Anemia     Arthritis     rheumatoid    Asthma  DJD (degenerative joint disease)     GERD (gastroesophageal reflux disease)     History of bleeding ulcers     Rheumatoid arthritis(714.0)        PAST SURGICAL HISTORY      Past Surgical History:   Procedure Laterality Date    CHOLECYSTECTOMY      COLONOSCOPY  11/08/2010    1CM RECURRENT SESSILE POLYP REMOVED AND CAUTERIZED    COLONOSCOPY  11/08/2010    5MM POLYP AT 20CM FROM ANAL VERGE  REMOVED AND BIOPSIED WITH COLD BIOPSY FORCEPS    JOINT REPLACEMENT Bilateral 05/05/2014    Dr Gamal Kovacs  10/19/2015    removal of L4/L5 hardware and fusion of L3/L4 lumbar spine by Dr. Ramses Montes Right 08/18/2011    SIJ    OTHER SURGICAL HISTORY  12/11/2012    caudal epidural    OTHER SURGICAL HISTORY Right 05/23/2013     L4 TFE    OTHER SURGICAL HISTORY Right 01/23/2014    HIP INJECTION    SPINE SURGERY  2010    Fusion and cage     TOTAL ABDOMINAL HYSTERECTOMY  04/12/2011       FAMILY HISTORY      Family History   Problem Relation Age of Onset    Heart Disease Paternal Grandmother     High Blood Pressure Paternal Grandmother     Heart Disease Paternal Grandfather     High Blood Pressure Paternal Grandfather     Heart Disease Father     High Blood Pressure Father     Arthritis Father     Rheum Arthritis Father     Stroke Maternal Grandmother     Diabetes Maternal Grandmother     Stroke Maternal Grandfather     Diabetes Maternal Grandfather     Arthritis Daughter     Rheum Arthritis Mother        SOCIAL HISTORY      Social History     Tobacco History     Smoking Status  Current Every Day Smoker Smoking Start Date  1/1/1980 Smoking Frequency  1 pack/day for 40 years (36 pk yrs) Smoking Tobacco Type  Cigarettes    Smokeless Tobacco Use  Never Used          Alcohol History     Alcohol Use Status  No          Drug Use     Drug Use Status  No          Sexual Activity     Sexually Active  Not Currently Partners  Male                ALLERGIES     Allergies   Allergen Reactions    Avelox [Moxifloxacin]      Rash and edema    Bactrim [Sulfamethoxazole-Trimethoprim]      hives    Cefuroxime Axetil Nausea Only    Codeine      Nausea and rash    Sulfa Antibiotics Hives    Xeljanz [Tofacitinib] Diarrhea     vomit       CURRENT MEDICATIONS      Current Outpatient Medications   Medication Sig Dispense Refill    hydroxychloroquine (PLAQUENIL) 200 mg tablet TAKE ONE TABLET BY MOUTH EVERY MORNING AND 1 TABLET EVERY NIGHT 60 tablet 0    predniSONE (DELTASONE) 2.5 MG tablet Take 3 tablets by mouth daily 90 tablet 0    denosumab (PROLIA) 60 MG/ML SOSY SC injection Inject 1 mL into the skin once for 1 dose 180 mL 2    Handicap Placard MISC by Does not apply route Expires May 2, 2027 1 each 0    Misc. Devices (WHEELCHAIR CUSHION) MISC Wheelchair cushion 1 each 0    DULoxetine (CYMBALTA) 30 MG extended release capsule duloxetine 30 mg capsule,delayed release      HYDROcodone-acetaminophen (NORCO)  MG per tablet Take 1 tablet by mouth every 6 hours as needed for Pain.       montelukast (SINGULAIR) 10 MG tablet TAKE 1 TABLET BY MOUTH ONE TIME A DAY 90 tablet 3    metoprolol tartrate (LOPRESSOR) 50 MG tablet TAKE ONE-HALF TABLET BY MOUTH TWICE A DAY 30 tablet 0    Elastic Bandages & Supports (MEDICAL COMPRESSION STOCKINGS) MISC 1 each by NOT APPLICABLE route daily bilateral      diclofenac sodium 1 % GEL Apply topically nightly      Lift Chair MISC by Does not apply route 1 each 0    traZODone (DESYREL) 100 MG tablet Take 1 tablet by mouth nightly 90 tablet 1    BD PEN NEEDLE JEFF U/F 32G X 4 MM MISC       Lancets [facility]. (ACCU-CHEK FASTCLIX LANCET) KIT       melatonin 5 MG TBDP disintegrating tablet Take 10 mg by mouth nightly      diphenhydrAMINE HCl (BENADRYL ALLERGY PO) Take 25 mg by mouth nightly      blood glucose test strips (EXACTECH TEST) strip 1 strip by Other route 4 times daily      esomeprazole (NEXIUM) 40 MG delayed release capsule TAKE 1 CAPSULE BY MOUTH 2 TIMES A DAY 60 capsule 5    ondansetron (ZOFRAN ODT) 4 MG disintegrating tablet Take 1 tablet by mouth every 8 hours as needed for Nausea or Vomiting 30 tablet 2    lidocaine (LIDODERM) 5 % Place 1 patch onto the skin daily 12 hours on, 12 hours off. 30 patch 5    Compression Stockings MISC by Does not apply route Needs 20-30mmHg 6 each 0    calcium carbonate (OSCAL) 500 MG TABS tablet Take 500 mg by mouth daily      Loratadine 10 MG CAPS Take by mouth      Cholecalciferol (VITAMIN D) 2000 UNITS CAPS capsule Take 2,000 Units by mouth 2 times daily       No current facility-administered medications for this visit. PHYSICAL EXAMINATION / OBJECTIVE   Objective:  /86 (Site: Left Upper Arm, Position: Sitting, Cuff Size: Medium Adult)   Pulse 86   Ht 5' 5\" (1.651 m)   Wt 179 lb (81.2 kg)   LMP 04/12/2012   SpO2 98%   BMI 29.79 kg/m²     Physical Exam  Vitals reviewed. Constitutional:       Appearance: She is well-developed. Cardiovascular:      Rate and Rhythm: Normal rate and regular rhythm. Pulmonary:      Effort: Pulmonary effort is normal.      Breath sounds: Normal breath sounds. Abdominal:      Palpations: Abdomen is soft. Tenderness: There is no abdominal tenderness. Musculoskeletal:      Cervical back: Normal range of motion and neck supple. Skin:     General: Skin is warm and dry. Findings: No rash. Neurological:      Mental Status: She is alert and oriented to person, place, and time. Deep Tendon Reflexes: Reflexes are normal and symmetric. Psychiatric:         Thought Content: Thought content normal.       Upper extremities:    SHOULDERS tender bilat anterior shoulder and AC joint   ELBOWS tender right. WRISTS tender and swelling bilat, limited and painful ROM. HANDS/FINGERS --- distal left 2nd finger amputation, swan neck changes,    MCPs - swelling/warmth bilat   PIPs: +  swelling bilat pips.      Lower extremities:  HIPS tender bilat outer hips  KNEES tender bilat, nswelling/warmth left knee. ANKLES tender bilat , + tinel bilat   FEET : tender and swelling bilat MTPs, + lateral deviation of toes. + bunions     Vasc: decreased cap refill left lower ext ~ 5 seconds,  ~ 4 sec. LABS    CBC  Lab Results   Component Value Date    WBC 5.1 11/10/2021    RBC 4.69 11/10/2021    HGB 13.2 11/10/2021    HCT 42.4 11/10/2021    MCV 90.4 11/10/2021    MCH 28.1 11/10/2021    MCHC 31.1 11/10/2021    RDW 16.0 03/12/2020     11/10/2021       CMP  Lab Results   Component Value Date    CALCIUM 8.8 11/10/2021    LABALBU 3.3 11/10/2021    PROT 6.7 11/10/2021     11/10/2021    K 4.2 11/10/2021    CO2 27 11/10/2021     11/10/2021    BUN 10 11/10/2021    CREATININE 0.5 11/10/2021    ALKPHOS 75 11/10/2021    ALT 10 11/10/2021    AST 15 11/10/2021       HgBA1c: No components found for: HGBA1C    Lab Results   Component Value Date    VITD25 103.5 10/12/2021       Lab Results   Component Value Date    RF >360 10/12/2021       No components found for: CANCASCRN, APANCASCRN  Lab Results   Component Value Date    SEDRATE 75 (H) 11/10/2021     Lab Results   Component Value Date    CRP 0.73 11/10/2021       RADIOLOGY:         ASSESSMENT/PLAN    Assessment   Plan     Seropositive rheumatoid arthritis +CCP >300, RF >360  Rheumatoid nodulosis  - diagnosed in 30's  - prior tx: methotrexate PO (nausea), subcu (LFT elevation), xeljanz (diarrhea and vomiting), humira (nausea), arava (hair loss),  Humira -- (nausea), enbrel Feb to march 2022 - rash, nasuea, inflammatory arthritis, bp elevation. - plaquenil 200 mg BID   - prednisone 7.5 mg daily    - prednisone taper for acute flare of the rheumatoid arthritis    - reviwed the concerns regarding the possible side effects.   - patient now willing to start the rituxan  Rituximab  Have discussed with the pt some of the potential risk assocaited with Rituxan which included but not limited to increased risk of infection, MAX virus/PML(risk 1/30,000) , Glennda Liter Abdias's, hepatic failure, acute flares of Hepatitis B,  pulmonary toxicity, nephrotoxicity, lymphopenia, nausea vomiting, rash/each carrier, neutropenia, arthralgia/myalgia, anemia, retention, anxiety, hyperuricemia  - prior to starting needs  hepatitis B , TB evaluation -- ### [facility] [place]. Osteoarthritis of multiple joints- likely 2/2 to #1    Chronic use of systemic steroids  Osteoporosis with current fracture  - on steroids for past 20 years. DEXA 12/19/2019 w/ T score -3.6 right forearm   - prolia 60 mg subcu q 6 months  - last in April 2022       Chronic midline low back pain with bilateral sciatica , Scoliosis  H/o cervicalgia and cervical spinal stenosis    Tobacco dependence  - smoking 1 PPD x 40 years  - pft ordered. Medication monitoring   - CBC, CMP, sed rate, CRP q 4-6 months    - TB gold negative (10/12/21)      No follow-ups on file. Electronically signed by Chana Wu DO on 6/23/2022 at 3:09 PM    New Prescriptions    No medications on file       Thank you for allowing me to participate in the care of this patient. Please call if there are any questions.

## 2022-07-19 DIAGNOSIS — M05.79 RHEUMATOID ARTHRITIS INVOLVING MULTIPLE SITES WITH POSITIVE RHEUMATOID FACTOR (HCC): ICD-10-CM

## 2022-07-19 RX ORDER — HYDROXYCHLOROQUINE SULFATE 200 MG/1
TABLET, FILM COATED ORAL
Qty: 60 TABLET | Refills: 0 | Status: SHIPPED | OUTPATIENT
Start: 2022-07-19 | End: 2022-08-08 | Stop reason: SDUPTHER

## 2022-07-19 RX ORDER — PREDNISONE 2.5 MG
TABLET ORAL
Qty: 90 TABLET | Refills: 0 | Status: SHIPPED | OUTPATIENT
Start: 2022-07-19 | End: 2022-08-08 | Stop reason: SDUPTHER

## 2022-07-27 ENCOUNTER — TELEPHONE (OUTPATIENT)
Dept: RHEUMATOLOGY | Age: 57
End: 2022-07-27

## 2022-07-27 DIAGNOSIS — Z51.81 MEDICATION MONITORING ENCOUNTER: Primary | ICD-10-CM

## 2022-07-27 NOTE — TELEPHONE ENCOUNTER
Patient is calling in stating she is due to start having rituxin infusions. She called [facility] in [place] and they have not received the orders. Please verify and refax if ok?

## 2022-08-08 ENCOUNTER — OFFICE VISIT (OUTPATIENT)
Dept: RHEUMATOLOGY | Age: 57
End: 2022-08-08
Payer: COMMERCIAL

## 2022-08-08 ENCOUNTER — TELEPHONE (OUTPATIENT)
Dept: RHEUMATOLOGY | Age: 57
End: 2022-08-08

## 2022-08-08 VITALS
HEIGHT: 65 IN | HEART RATE: 97 BPM | BODY MASS INDEX: 29.82 KG/M2 | WEIGHT: 179 LBS | DIASTOLIC BLOOD PRESSURE: 78 MMHG | SYSTOLIC BLOOD PRESSURE: 120 MMHG | OXYGEN SATURATION: 94 %

## 2022-08-08 DIAGNOSIS — Z51.81 MEDICATION MONITORING ENCOUNTER: ICD-10-CM

## 2022-08-08 DIAGNOSIS — F17.200 TOBACCO DEPENDENCE: ICD-10-CM

## 2022-08-08 DIAGNOSIS — M54.41 CHRONIC MIDLINE LOW BACK PAIN WITH BILATERAL SCIATICA: ICD-10-CM

## 2022-08-08 DIAGNOSIS — M05.79 RHEUMATOID ARTHRITIS INVOLVING MULTIPLE SITES WITH POSITIVE RHEUMATOID FACTOR (HCC): Primary | ICD-10-CM

## 2022-08-08 DIAGNOSIS — M54.42 CHRONIC MIDLINE LOW BACK PAIN WITH BILATERAL SCIATICA: ICD-10-CM

## 2022-08-08 DIAGNOSIS — M15.3 OTHER SECONDARY OSTEOARTHRITIS OF MULTIPLE SITES: ICD-10-CM

## 2022-08-08 DIAGNOSIS — M06.30 RHEUMATOID NODULES (HCC): ICD-10-CM

## 2022-08-08 DIAGNOSIS — M80.00XA OSTEOPOROSIS WITH CURRENT PATHOLOGICAL FRACTURE, UNSPECIFIED OSTEOPOROSIS TYPE, INITIAL ENCOUNTER: ICD-10-CM

## 2022-08-08 DIAGNOSIS — G89.29 CHRONIC MIDLINE LOW BACK PAIN WITH BILATERAL SCIATICA: ICD-10-CM

## 2022-08-08 PROCEDURE — G8419 CALC BMI OUT NRM PARAM NOF/U: HCPCS | Performed by: NURSE PRACTITIONER

## 2022-08-08 PROCEDURE — 3017F COLORECTAL CA SCREEN DOC REV: CPT | Performed by: NURSE PRACTITIONER

## 2022-08-08 PROCEDURE — 99214 OFFICE O/P EST MOD 30 MIN: CPT | Performed by: NURSE PRACTITIONER

## 2022-08-08 PROCEDURE — G8427 DOCREV CUR MEDS BY ELIG CLIN: HCPCS | Performed by: NURSE PRACTITIONER

## 2022-08-08 PROCEDURE — 4004F PT TOBACCO SCREEN RCVD TLK: CPT | Performed by: NURSE PRACTITIONER

## 2022-08-08 RX ORDER — PREDNISONE 2.5 MG
TABLET ORAL
Qty: 90 TABLET | Refills: 3 | Status: SHIPPED | OUTPATIENT
Start: 2022-08-08

## 2022-08-08 RX ORDER — HYDROXYCHLOROQUINE SULFATE 200 MG/1
TABLET, FILM COATED ORAL
Qty: 60 TABLET | Refills: 5 | Status: SHIPPED | OUTPATIENT
Start: 2022-08-08

## 2022-08-08 ASSESSMENT — ENCOUNTER SYMPTOMS
NAUSEA: 0
SHORTNESS OF BREATH: 0
BACK PAIN: 0
COUGH: 0
ABDOMINAL PAIN: 0
DIARRHEA: 0
EYE PAIN: 0
EYE ITCHING: 0
CONSTIPATION: 0
TROUBLE SWALLOWING: 0

## 2022-08-08 NOTE — TELEPHONE ENCOUNTER
The risk of Gi upset with this medication is fairly low. Our only other option would be to pursue orencia, however there is the risk of COPD flares with this medication, and given your smoking history we would want to rule out any underlying COPD. Dr. Sherry Huang did order breathing tests, did you have these completed?

## 2022-08-08 NOTE — PROGRESS NOTES
numbness. Psychiatric/Behavioral:  The patient is not nervous/anxious.       PAST MEDICAL HISTORY      Past Medical History:   Diagnosis Date    Anemia     Arthritis     rheumatoid    Asthma     DJD (degenerative joint disease)     GERD (gastroesophageal reflux disease)     History of bleeding ulcers     Rheumatoid arthritis(714.0)        PAST SURGICAL HISTORY      Past Surgical History:   Procedure Laterality Date    CHOLECYSTECTOMY      COLONOSCOPY  11/08/2010    1CM RECURRENT SESSILE POLYP REMOVED AND CAUTERIZED    COLONOSCOPY  11/08/2010    5MM POLYP AT 20CM FROM ANAL VERGE  REMOVED AND BIOPSIED WITH COLD BIOPSY FORCEPS    HYSTERECTOMY, TOTAL ABDOMINAL (CERVIX REMOVED)  04/12/2011    JOINT REPLACEMENT Bilateral 05/05/2014    Dr Caren Guy  10/19/2015    removal of L4/L5 hardware and fusion of L3/L4 lumbar spine by Dr. Zoë Carrillo Right 08/18/2011    SIJ    OTHER SURGICAL HISTORY  12/11/2012    caudal epidural    OTHER SURGICAL HISTORY Right 05/23/2013     L4 TFE    OTHER SURGICAL HISTORY Right 01/23/2014    HIP INJECTION    SPINE SURGERY  2010    Fusion and cage        FAMILY HISTORY      Family History   Problem Relation Age of Onset    Heart Disease Paternal Grandmother     High Blood Pressure Paternal Grandmother     Heart Disease Paternal Grandfather     High Blood Pressure Paternal Grandfather     Heart Disease Father     High Blood Pressure Father     Arthritis Father     Rheum Arthritis Father     Stroke Maternal Grandmother     Diabetes Maternal Grandmother     Stroke Maternal Grandfather     Diabetes Maternal Grandfather     Arthritis Daughter     Rheum Arthritis Mother        SOCIAL HISTORY      Social History       Tobacco History       Smoking Status  Current Every Day Smoker Smoking Start Date  1/1/1980 Smoking Frequency  1 pack/day for 40 years (36 pk yrs) Smoking Tobacco Type  Cigarettes      Smokeless Tobacco Use  Never Used              Alcohol History mouth every 8 hours as needed for Nausea or Vomiting 30 tablet 2    lidocaine (LIDODERM) 5 % Place 1 patch onto the skin daily 12 hours on, 12 hours off. 30 patch 5    Compression Stockings MISC by Does not apply route Needs 20-30mmHg 6 each 0    calcium carbonate (OSCAL) 500 MG TABS tablet Take 500 mg by mouth daily      Loratadine 10 MG CAPS Take by mouth      Cholecalciferol (VITAMIN D) 2000 UNITS CAPS capsule Take 2,000 Units by mouth 2 times daily      denosumab (PROLIA) 60 MG/ML SOSY SC injection Inject 1 mL into the skin once for 1 dose 180 mL 2    DULoxetine (CYMBALTA) 30 MG extended release capsule duloxetine 30 mg capsule,delayed release (Patient not taking: Reported on 8/8/2022)      blood glucose test strips (EXACTECH TEST) strip 1 strip by Other route 4 times daily       No current facility-administered medications for this visit. PHYSICAL EXAMINATION / OBJECTIVE   Objective:  /78 (Site: Right Upper Arm, Position: Sitting, Cuff Size: Large Adult)   Pulse 97   Ht 5' 5\" (1.651 m)   Wt 179 lb (81.2 kg)   LMP 04/12/2012   SpO2 94%   BMI 29.79 kg/m²     Physical Exam  Vitals reviewed. Constitutional:       Appearance: She is well-developed. Cardiovascular:      Rate and Rhythm: Normal rate and regular rhythm. Pulmonary:      Effort: Pulmonary effort is normal.      Breath sounds: Normal breath sounds. Musculoskeletal:      Cervical back: Normal range of motion and neck supple. Skin:     General: Skin is warm and dry. Findings: No rash. Neurological:      Mental Status: She is alert and oriented to person, place, and time. Psychiatric:         Thought Content:  Thought content normal.     Upper extremities:    SHOULDERS tender bilat  ELBOWS tender bilat,   WRISTS tender and swelling bilat, limited ROM,   HANDS/FINGERS     MCPs swelling bilat    PIPs swelling bilat   + swan neck changes, distal left 2nd finger amputation    Lower extremities:  HIPS tender bilat outer hips  KNEES tender bilat, no swelling  ANKLES tender bilat   FEET : tender and swelling bilat MTPs, + lateral deviation of toes.  + bunions        LABS    CBC  Lab Results   Component Value Date/Time    WBC 5.1 11/10/2021 03:31 PM    RBC 4.69 11/10/2021 03:31 PM    HGB 13.2 11/10/2021 03:31 PM    HCT 42.4 11/10/2021 03:31 PM    MCV 90.4 11/10/2021 03:31 PM    MCH 28.1 11/10/2021 03:31 PM    MCHC 31.1 11/10/2021 03:31 PM    RDW 16.0 03/12/2020 05:25 PM     11/10/2021 03:31 PM       CMP  Lab Results   Component Value Date/Time    CALCIUM 8.8 11/10/2021 03:31 PM    LABALBU 3.3 11/10/2021 03:31 PM    PROT 6.7 11/10/2021 03:31 PM     11/10/2021 03:31 PM    K 4.2 11/10/2021 03:31 PM    CO2 27 11/10/2021 03:31 PM     11/10/2021 03:31 PM    BUN 10 11/10/2021 03:31 PM    CREATININE 0.5 11/10/2021 03:31 PM    ALKPHOS 75 11/10/2021 03:31 PM    ALT 10 11/10/2021 03:31 PM    AST 15 11/10/2021 03:31 PM       HgBA1c: No components found for: HGBA1C    Lab Results   Component Value Date/Time    VITD25 103.5 10/12/2021 12:00 AM         No results found for: ANASCRN  No results found for: SSA  No results found for: SSB  No results found for: ANTI-SMITH  No results found for: DSDNAAB   No results found for: ANTIRNP  No results found for: C3, C4  No results found for: CCPAB  Lab Results   Component Value Date    RF >360 10/12/2021       No components found for: CANCASCRN, APANCASCRN  Lab Results   Component Value Date    SEDRATE 75 (H) 11/10/2021     Lab Results   Component Value Date    CRP 0.73 11/10/2021       RADIOLOGY:         ASSESSMENT/PLAN    Assessment   Plan     Seropositive rheumatoid arthritis +CCP >300, RF >360  Rheumatoid nodulosis  - diagnosed in 30's  - prior tx: methotrexate PO (nausea), subcu (LFT elevation), xeljanz (diarrhea and vomiting), humira (nausea), arava (hair loss), enbrel (rash, vomiting, vision changes), rituxan (rash, increased joint pains)     - plaquenil 200 mg BID    - prednisone 7.5 mg daily   - discussed IL-6- patient given information and will let us know    Osteoarthritis of multiple joints  - likely 2/2 to #1    Chronic use of systemic steroids  Osteoporosis with current fracture  - on steroids for past 20 years. DEXA 12/19/2019 w/ T score -3.6 right forearm   - prolia 60 mg subcu q 6 months - last dose 4/2022    Chronic midline low back pain with bilateral sciatica  Scoliosis  H/o cervicalgia and cervical spinal stenosis    Tobacco dependence  - smoking 1 PPD x 40 years    Medication monitoring   - CBC, CMP, sed rate, CRP q 4-6 months    - TB gold negative (10/12/21)      No follow-ups on file. Electronically signed by JOSE Will - CNP on 8/8/2022 at 10:40 AM    New Prescriptions    No medications on file       Thank you for allowing me to participate in the care of this patient. Please call if there are any questions.

## 2022-08-08 NOTE — TELEPHONE ENCOUNTER
Spoke to patient, she is willing to try the [address] No she has not gotten the breathing test. Im printing off the order and having it faxed to [phone] per patient.

## 2022-08-08 NOTE — TELEPHONE ENCOUNTER
Will need a SRAVANI Enriquez, can you please work on [phone] for actemra 162 mg subcu q 2 weeks for seropositive rheumatoid arthritis?

## 2022-08-08 NOTE — TELEPHONE ENCOUNTER
Patient was seen in office today. They called regarding the medication  Actemra, the patient does not want to participate with this. She stated she has stomach issues and after reading the side effects, she decided to withdrawal from this.

## 2022-08-10 ENCOUNTER — TELEPHONE (OUTPATIENT)
Dept: PHARMACY | Facility: CLINIC | Age: 57
End: 2022-08-10

## 2022-08-10 RX ORDER — SARILUMAB 200 MG/1.14ML
200 INJECTION, SOLUTION SUBCUTANEOUS
Qty: 2 PEN | Refills: 5 | Status: SHIPPED | OUTPATIENT
Start: 2022-08-10

## 2022-08-10 RX ORDER — SARILUMAB 150 MG/1.14ML
INJECTION, SOLUTION SUBCUTANEOUS
Status: CANCELLED | OUTPATIENT
Start: 2022-08-10

## 2022-08-10 NOTE — TELEPHONE ENCOUNTER
PA for Edd Velazqeuz approved through 8/9/23. Please send a prescription to [facility].

## 2022-10-11 ENCOUNTER — OFFICE VISIT (OUTPATIENT)
Dept: RHEUMATOLOGY | Age: 57
End: 2022-10-11
Payer: COMMERCIAL

## 2022-10-11 VITALS
HEIGHT: 65 IN | OXYGEN SATURATION: 95 % | HEART RATE: 84 BPM | BODY MASS INDEX: 29.82 KG/M2 | WEIGHT: 179 LBS | DIASTOLIC BLOOD PRESSURE: 80 MMHG | SYSTOLIC BLOOD PRESSURE: 132 MMHG

## 2022-10-11 DIAGNOSIS — M80.00XA OSTEOPOROSIS WITH CURRENT PATHOLOGICAL FRACTURE, UNSPECIFIED OSTEOPOROSIS TYPE, INITIAL ENCOUNTER: ICD-10-CM

## 2022-10-11 DIAGNOSIS — M06.30 RHEUMATOID NODULES (HCC): ICD-10-CM

## 2022-10-11 DIAGNOSIS — F17.200 TOBACCO DEPENDENCE: ICD-10-CM

## 2022-10-11 DIAGNOSIS — M54.41 CHRONIC MIDLINE LOW BACK PAIN WITH BILATERAL SCIATICA: ICD-10-CM

## 2022-10-11 DIAGNOSIS — G89.29 CHRONIC MIDLINE LOW BACK PAIN WITH BILATERAL SCIATICA: ICD-10-CM

## 2022-10-11 DIAGNOSIS — M15.3 OTHER SECONDARY OSTEOARTHRITIS OF MULTIPLE SITES: ICD-10-CM

## 2022-10-11 DIAGNOSIS — M54.42 CHRONIC MIDLINE LOW BACK PAIN WITH BILATERAL SCIATICA: ICD-10-CM

## 2022-10-11 DIAGNOSIS — Z51.81 MEDICATION MONITORING ENCOUNTER: ICD-10-CM

## 2022-10-11 DIAGNOSIS — M05.79 RHEUMATOID ARTHRITIS INVOLVING MULTIPLE SITES WITH POSITIVE RHEUMATOID FACTOR (HCC): Primary | ICD-10-CM

## 2022-10-11 PROCEDURE — G8484 FLU IMMUNIZE NO ADMIN: HCPCS | Performed by: NURSE PRACTITIONER

## 2022-10-11 PROCEDURE — 4004F PT TOBACCO SCREEN RCVD TLK: CPT | Performed by: NURSE PRACTITIONER

## 2022-10-11 PROCEDURE — G8419 CALC BMI OUT NRM PARAM NOF/U: HCPCS | Performed by: NURSE PRACTITIONER

## 2022-10-11 PROCEDURE — G8427 DOCREV CUR MEDS BY ELIG CLIN: HCPCS | Performed by: NURSE PRACTITIONER

## 2022-10-11 PROCEDURE — 99214 OFFICE O/P EST MOD 30 MIN: CPT | Performed by: NURSE PRACTITIONER

## 2022-10-11 PROCEDURE — 3017F COLORECTAL CA SCREEN DOC REV: CPT | Performed by: NURSE PRACTITIONER

## 2022-10-11 ASSESSMENT — ENCOUNTER SYMPTOMS
EYE PAIN: 0
COUGH: 0
DIARRHEA: 0
NAUSEA: 0
TROUBLE SWALLOWING: 0
ABDOMINAL PAIN: 0
BACK PAIN: 1
CONSTIPATION: 0
EYE ITCHING: 0
SHORTNESS OF BREATH: 0

## 2022-10-11 NOTE — PROGRESS NOTES
[facility] RHEUMATOLOGY FOLLOW UP NOTE       Date Of Service: 10/11/2022  Provider: JOSE Pena - CNP    Name: Manisha Kelly   MRN: [id number]    Chief Complaint(s)     Chief Complaint   Patient presents with    Follow-up     2 month f/u Rheumatoid arthritis involving multiple sites with positive rheumatoid factor ([facility] [address]        History of Present Illness (HPI)     Manisha Kelly  is a(n)56 y.o. female with a hx of chronic low back pain, osteoporosis, rheumatoid arthritis, osteoarthritis of multiple joints, chronic tobacco dependence, osteoarthritis bilat hips s/p bilat THR here for the f/u evaluation of rheumatoid arthritis. Interval hx:    - started the kevzara- took 2 doses with increased joint pain and abdominal pain    pain affecting the back  Pain on a scale 0-10: 8.5/10  Type of pain: constant hands, low back  Timing: mornings  Aggravating factors: cold exposure, weather changes, left ankle: wt bearing  Alleviating factors: norco, warmth, corticosteroid    Associated symptoms:  denies swelling/  Redness/ warmth, denies AM stiffness      REVIEW OF SYSTEMS: (ROS)    Review of Systems   Constitutional:  Negative for fatigue, fever and unexpected weight change. HENT:  Negative for congestion and trouble swallowing. Dry mouth   Eyes:  Negative for pain and itching. Respiratory:  Negative for cough and shortness of breath. Cardiovascular:  Negative for chest pain and leg swelling. Gastrointestinal:  Negative for abdominal pain, constipation, diarrhea and nausea. Endocrine: Negative for cold intolerance and heat intolerance. Genitourinary:  Negative for difficulty urinating, frequency and urgency. Musculoskeletal:  Positive for back pain. Negative for joint swelling. Skin:  Negative for rash. Neurological:  Negative for dizziness, weakness, numbness and headaches. Psychiatric/Behavioral:  The patient is not nervous/anxious.       PAST MEDICAL HISTORY      Past Medical History:   Diagnosis Date    Anemia     Arthritis     rheumatoid    Asthma     DJD (degenerative joint disease)     GERD (gastroesophageal reflux disease)     History of bleeding ulcers     Rheumatoid arthritis(714.0)        PAST SURGICAL HISTORY      Past Surgical History:   Procedure Laterality Date    CHOLECYSTECTOMY      COLONOSCOPY  11/08/2010    1CM RECURRENT SESSILE POLYP REMOVED AND CAUTERIZED    COLONOSCOPY  11/08/2010    5MM POLYP AT 20CM FROM ANAL VERGE  REMOVED AND BIOPSIED WITH COLD BIOPSY FORCEPS    HYSTERECTOMY, TOTAL ABDOMINAL (CERVIX REMOVED)  04/12/2011    JOINT REPLACEMENT Bilateral 05/05/2014    Dr Tom Booth  10/19/2015    removal of L4/L5 hardware and fusion of L3/L4 lumbar spine by Dr. Anival Sanchez Right 08/18/2011    SIJ    OTHER SURGICAL HISTORY  12/11/2012    caudal epidural    OTHER SURGICAL HISTORY Right 05/23/2013     L4 TFE    OTHER SURGICAL HISTORY Right 01/23/2014    HIP INJECTION    SPINE SURGERY  2010    Fusion and cage        FAMILY HISTORY      Family History   Problem Relation Age of Onset    Heart Disease Paternal Grandmother     High Blood Pressure Paternal Grandmother     Heart Disease Paternal Grandfather     High Blood Pressure Paternal Grandfather     Heart Disease Father     High Blood Pressure Father     Arthritis Father     Rheum Arthritis Father     Stroke Maternal Grandmother     Diabetes Maternal Grandmother     Stroke Maternal Grandfather     Diabetes Maternal Grandfather     Arthritis Daughter     Rheum Arthritis Mother        SOCIAL HISTORY      Social History       Tobacco History       Smoking Status  Current Every Day Smoker Smoking Start Date  1/1/1980 Smoking Frequency  1 pack/day for 40 years (36 pk yrs) Smoking Tobacco Type  Cigarettes      Smokeless Tobacco Use  Never Used              Alcohol History       Alcohol Use Status  No              Drug Use       Drug Use Status  No              Sexual Activity Sexually Active  Not Currently Partners  Male                    ALLERGIES     Allergies   Allergen Reactions    Avelox [Moxifloxacin]      Rash and edema    Bactrim [Sulfamethoxazole-Trimethoprim]      hives    Cefuroxime Axetil Nausea Only    Codeine      Nausea and rash    Sulfa Antibiotics Hives    Xeljanz [Tofacitinib] Diarrhea     vomit       CURRENT MEDICATIONS      Current Outpatient Medications   Medication Sig Dispense Refill    sarilumab (KEVZARA) 200 MG/1. 14ML SOAJ injection Inject 1.14 mLs into the skin every 14 days 2 pen 5    predniSONE (DELTASONE) 2.5 MG tablet TAKE THREE TABLETS BY MOUTH EVERY DAY 90 tablet 3    hydroxychloroquine (PLAQUENIL) 200 MG tablet TAKE 1 TABLET BY MOUTH EVERY MORNING AND 1 TABLET EVERY NIGHT 60 tablet 5    denosumab (PROLIA) 60 MG/ML SOSY SC injection Inject 1 mL into the skin once for 1 dose 180 mL 2    Handicap Placard MISC by Does not apply route Expires May 2, 2027 1 each 0    Misc.  Devices (WHEELCHAIR CUSHION) MISC Wheelchair cushion 1 each 0    DULoxetine (CYMBALTA) 30 MG extended release capsule duloxetine 30 mg capsule,delayed release (Patient not taking: Reported on 8/8/2022)      HYDROcodone-acetaminophen (NORCO)  MG per tablet Take 1 tablet by mouth every 6 hours as needed for Pain.      montelukast (SINGULAIR) 10 MG tablet TAKE 1 TABLET BY MOUTH ONE TIME A DAY 90 tablet 3    metoprolol tartrate (LOPRESSOR) 50 MG tablet TAKE ONE-HALF TABLET BY MOUTH TWICE A DAY 30 tablet 0    Elastic Bandages & Supports (MEDICAL COMPRESSION STOCKINGS) MISC 1 each by NOT APPLICABLE route daily bilateral      diclofenac sodium 1 % GEL Apply topically nightly      Lift Chair MISC by Does not apply route 1 each 0    traZODone (DESYREL) 100 MG tablet Take 1 tablet by mouth nightly 90 tablet 1    BD PEN NEEDLE JEFF U/F 32G X 4 MM MISC       Lancets Misc. (ACCU-CHEK FASTCLIX LANCET) KIT       melatonin 5 MG TBDP disintegrating tablet Take 10 mg by mouth nightly diphenhydrAMINE HCl (BENADRYL ALLERGY PO) Take 25 mg by mouth nightly      blood glucose test strips (EXACTECH TEST) strip 1 strip by Other route 4 times daily      esomeprazole (NEXIUM) 40 MG delayed release capsule TAKE 1 CAPSULE BY MOUTH 2 TIMES A DAY 60 capsule 5    ondansetron (ZOFRAN ODT) 4 MG disintegrating tablet Take 1 tablet by mouth every 8 hours as needed for Nausea or Vomiting 30 tablet 2    lidocaine (LIDODERM) 5 % Place 1 patch onto the skin daily 12 hours on, 12 hours off. 30 patch 5    Compression Stockings MISC by Does not apply route Needs 20-30mmHg 6 each 0    calcium carbonate (OSCAL) 500 MG TABS tablet Take 500 mg by mouth daily      Loratadine 10 MG CAPS Take by mouth      Cholecalciferol (VITAMIN D) 2000 UNITS CAPS capsule Take 2,000 Units by mouth 2 times daily       No current facility-administered medications for this visit. PHYSICAL EXAMINATION / OBJECTIVE   Objective:  /80 (Site: Left Upper Arm, Position: Sitting, Cuff Size: Medium Adult)   Pulse 84   Ht 5' 5\" (1.651 m)   Wt 179 lb (81.2 kg)   LMP 04/12/2012   SpO2 95%   BMI 29.79 kg/m²     Physical Exam  Vitals reviewed. Constitutional:       Appearance: She is well-developed. Cardiovascular:      Rate and Rhythm: Normal rate and regular rhythm. Pulmonary:      Effort: Pulmonary effort is normal.      Breath sounds: Normal breath sounds. Musculoskeletal:      Cervical back: Normal range of motion and neck supple. Skin:     General: Skin is warm and dry. Findings: No rash. Neurological:      Mental Status: She is alert and oriented to person, place, and time. Psychiatric:         Thought Content:  Thought content normal.     Upper extremities:    SHOULDERS nontender  ELBOWS nontender  WRISTS nontender, fullness bilat, limited ROM  HANDS/FINGERS     MCPs nontender, + fullness bilat    PIPs nontender, + swelling bilat   + swan neck changes, distal left 2nd finger amputation    Lower extremities:  HIPS nontender  KNEES nontender, no swelling  ANKLES tender left, no swelling  FEET : tender and swelling bilat MTPs, + lateral deviation of toes.  + bunions        LABS    CBC  Lab Results   Component Value Date/Time    WBC 5.1 11/10/2021 03:31 PM    RBC 4.69 11/10/2021 03:31 PM    HGB 13.2 11/10/2021 03:31 PM    HCT 42.4 11/10/2021 03:31 PM    MCV 90.4 11/10/2021 03:31 PM    MCH 28.1 11/10/2021 03:31 PM    MCHC 31.1 11/10/2021 03:31 PM    RDW 16.0 03/12/2020 05:25 PM     11/10/2021 03:31 PM       CMP  Lab Results   Component Value Date/Time    CALCIUM 8.8 11/10/2021 03:31 PM    LABALBU 3.3 11/10/2021 03:31 PM    PROT 6.7 11/10/2021 03:31 PM     11/10/2021 03:31 PM    K 4.2 11/10/2021 03:31 PM    CO2 27 11/10/2021 03:31 PM     11/10/2021 03:31 PM    BUN 10 11/10/2021 03:31 PM    CREATININE 0.5 11/10/2021 03:31 PM    ALKPHOS 75 11/10/2021 03:31 PM    ALT 10 11/10/2021 03:31 PM    AST 15 11/10/2021 03:31 PM       HgBA1c: No components found for: HGBA1C    Lab Results   Component Value Date/Time    VITD25 103.5 10/12/2021 12:00 AM         No results found for: ANASCRN  No results found for: SSA  No results found for: SSB  No results found for: ANTI-SMITH  No results found for: DSDNAAB   No results found for: ANTIRNP  No results found for: C3, C4  No results found for: CCPAB  Lab Results   Component Value Date    RF >360 10/12/2021       No components found for: CANCASCRN, APANCASCRN  Lab Results   Component Value Date    SEDRATE 75 (H) 11/10/2021     Lab Results   Component Value Date    CRP 0.73 11/10/2021       RADIOLOGY:         ASSESSMENT/PLAN    Assessment   Plan     Seropositive rheumatoid arthritis +CCP >300, RF >360  Rheumatoid nodulosis  - diagnosed in 30's  - prior tx: methotrexate PO (nausea), subcu (LFT elevation), xeljanz (diarrhea and vomiting), humira (nausea), arava (hair loss), enbrel (rash, vomiting, vision changes), rituxan (rash, increased joint pains), kevzara (increased joint pain, abdominal pain)   - plaquenil 200 mg BID   - prednisone 7.5 mg daily   - discussed orencia start- need PFT's completed    Osteoarthritis of multiple joints  - likely 2/2 to #1    Chronic use of systemic steroids  Osteoporosis with current fracture  - on steroids for past 20 years. DEXA 12/19/2019 w/ T score -3.6 right forearm   - prolia 60 mg subcu q 6 months - last dose 5/2022- will redose at next visit    Chronic midline low back pain with bilateral sciatica  Scoliosis  H/o cervicalgia and cervical spinal stenosis    Tobacco dependence  - smoking 1 PPD x 40 years    Medication monitoring   - CBC, CMP, sed rate, CRP q 4-6 months    - TB gold negative (10/12/21)      No follow-ups on file. Electronically signed by JOSE Nunez CNP on 10/11/2022 at 2:31 PM    New Prescriptions    No medications on file       Thank you for allowing me to participate in the care of this patient. Please call if there are any questions.

## 2022-10-18 LAB
ALBUMIN SERPL-MCNC: 3.8 G/DL
ALP BLD-CCNC: 53 U/L
ALT SERPL-CCNC: 14 U/L
ANION GAP SERPL CALCULATED.3IONS-SCNC: 9 MMOL/L
AST SERPL-CCNC: 15 U/L
BASOPHILS ABSOLUTE: 0.1 /ΜL
BASOPHILS RELATIVE PERCENT: 1.1 %
BILIRUB SERPL-MCNC: 0.5 MG/DL (ref 0.1–1.4)
BUN BLDV-MCNC: 8 MG/DL
C-REACTIVE PROTEIN: 0.8
CALCIUM SERPL-MCNC: 9.2 MG/DL
CHLORIDE BLD-SCNC: 102 MMOL/L
CHOLESTEROL, TOTAL: 143 MG/DL
CHOLESTEROL/HDL RATIO: NORMAL
CO2: 32 MMOL/L
CREAT SERPL-MCNC: 0.69 MG/DL
EOSINOPHILS ABSOLUTE: 0.2 /ΜL
EOSINOPHILS RELATIVE PERCENT: 2.7 %
GFR CALCULATED: 90
GLUCOSE BLD-MCNC: 82 MG/DL
HCT VFR BLD CALC: 38.8 % (ref 36–46)
HDLC SERPL-MCNC: 59 MG/DL (ref 35–70)
HEMOGLOBIN: 12.4 G/DL (ref 12–16)
LDL CHOLESTEROL CALCULATED: 63 MG/DL (ref 0–160)
LYMPHOCYTES ABSOLUTE: 2 /ΜL
LYMPHOCYTES RELATIVE PERCENT: 31.8 %
MCH RBC QN AUTO: 26.2 PG
MCHC RBC AUTO-ENTMCNC: 31.9 G/DL
MCV RBC AUTO: 82 FL
MONOCYTES ABSOLUTE: 0.6 /ΜL
MONOCYTES RELATIVE PERCENT: 8.7 %
NEUTROPHILS ABSOLUTE: 3.5 /ΜL
NEUTROPHILS RELATIVE PERCENT: 55.7 %
NONHDLC SERPL-MCNC: NORMAL MG/DL
PDW BLD-RTO: 15.1 %
PLATELET # BLD: 202 K/ΜL
PMV BLD AUTO: 9.5 FL
POTASSIUM SERPL-SCNC: 3.3 MMOL/L
RBC # BLD: 4.72 10^6/ΜL
SEDIMENTATION RATE, ERYTHROCYTE: 70
SODIUM BLD-SCNC: 143 MMOL/L
TOTAL PROTEIN: 6.6
TRIGL SERPL-MCNC: 107 MG/DL
VLDLC SERPL CALC-MCNC: NORMAL MG/DL
WBC # BLD: 6.3 10^3/ML

## 2022-10-31 ENCOUNTER — TELEPHONE (OUTPATIENT)
Dept: RHEUMATOLOGY | Age: 57
End: 2022-10-31

## 2022-10-31 NOTE — TELEPHONE ENCOUNTER
----- Message from Wilmer Londono DO sent at 10/28/2022 11:35 AM EDT -----  Lab testing revealing stable blood test but elevated inflammatory marker noted sed rate. This can indicate continued activity of the rheumatoid arthritis.

## 2022-12-09 DIAGNOSIS — M80.00XA OSTEOPOROSIS WITH CURRENT PATHOLOGICAL FRACTURE, UNSPECIFIED OSTEOPOROSIS TYPE, INITIAL ENCOUNTER: ICD-10-CM

## 2022-12-13 ENCOUNTER — OFFICE VISIT (OUTPATIENT)
Dept: RHEUMATOLOGY | Age: 57
End: 2022-12-13
Payer: COMMERCIAL

## 2022-12-13 VITALS
HEIGHT: 65 IN | DIASTOLIC BLOOD PRESSURE: 74 MMHG | OXYGEN SATURATION: 98 % | HEART RATE: 87 BPM | BODY MASS INDEX: 29.82 KG/M2 | WEIGHT: 179 LBS | SYSTOLIC BLOOD PRESSURE: 120 MMHG

## 2022-12-13 DIAGNOSIS — Z51.81 MEDICATION MONITORING ENCOUNTER: ICD-10-CM

## 2022-12-13 DIAGNOSIS — M06.30 RHEUMATOID NODULES (HCC): ICD-10-CM

## 2022-12-13 DIAGNOSIS — G89.29 CHRONIC MIDLINE LOW BACK PAIN WITH BILATERAL SCIATICA: ICD-10-CM

## 2022-12-13 DIAGNOSIS — M80.00XA OSTEOPOROSIS WITH CURRENT PATHOLOGICAL FRACTURE, UNSPECIFIED OSTEOPOROSIS TYPE, INITIAL ENCOUNTER: ICD-10-CM

## 2022-12-13 DIAGNOSIS — M05.79 RHEUMATOID ARTHRITIS INVOLVING MULTIPLE SITES WITH POSITIVE RHEUMATOID FACTOR (HCC): Primary | ICD-10-CM

## 2022-12-13 DIAGNOSIS — J98.4 RESTRICTIVE LUNG DISEASE: ICD-10-CM

## 2022-12-13 DIAGNOSIS — M54.41 CHRONIC MIDLINE LOW BACK PAIN WITH BILATERAL SCIATICA: ICD-10-CM

## 2022-12-13 DIAGNOSIS — F17.200 TOBACCO DEPENDENCE: ICD-10-CM

## 2022-12-13 DIAGNOSIS — M54.42 CHRONIC MIDLINE LOW BACK PAIN WITH BILATERAL SCIATICA: ICD-10-CM

## 2022-12-13 PROCEDURE — G8419 CALC BMI OUT NRM PARAM NOF/U: HCPCS | Performed by: INTERNAL MEDICINE

## 2022-12-13 PROCEDURE — 4004F PT TOBACCO SCREEN RCVD TLK: CPT | Performed by: INTERNAL MEDICINE

## 2022-12-13 PROCEDURE — 99214 OFFICE O/P EST MOD 30 MIN: CPT | Performed by: INTERNAL MEDICINE

## 2022-12-13 PROCEDURE — G8484 FLU IMMUNIZE NO ADMIN: HCPCS | Performed by: INTERNAL MEDICINE

## 2022-12-13 PROCEDURE — 3017F COLORECTAL CA SCREEN DOC REV: CPT | Performed by: INTERNAL MEDICINE

## 2022-12-13 PROCEDURE — G8427 DOCREV CUR MEDS BY ELIG CLIN: HCPCS | Performed by: INTERNAL MEDICINE

## 2022-12-13 RX ORDER — AZATHIOPRINE 50 MG/1
TABLET ORAL
Qty: 60 TABLET | Refills: 0 | Status: SHIPPED | OUTPATIENT
Start: 2022-12-13

## 2022-12-13 ASSESSMENT — ENCOUNTER SYMPTOMS
EYE PAIN: 0
NAUSEA: 0
TROUBLE SWALLOWING: 0
CONSTIPATION: 0
SHORTNESS OF BREATH: 0
ABDOMINAL PAIN: 0
EYE ITCHING: 0
DIARRHEA: 0
COUGH: 0
BACK PAIN: 1

## 2022-12-13 NOTE — PROGRESS NOTES
1 month courtesy refill has been sent per standing orders     [facility] RHEUMATOLOGY FOLLOW UP NOTE       Date Of Service: 12/13/2022  Provider: Gene Gregory DO    Name: Karlene Mcadams   MRN: [id number]    Chief Complaint(s)     Chief Complaint   Patient presents with    Follow-up     2 month f/u RA    Bilateral hip Rheumatoid arthritis involving multiple sites with positive rheumatoid factor         History of Present Illness (HPI)     Karlene Mcadams  is a(n)56 y.o. female with a hx of chronic low back pain, osteoporosis, rheumatoid arthritis, osteoarthritis of multiple joints, chronic tobacco dependence, osteoarthritis bilat hips s/p bilat THR here for the f/u evaluation of rheumatoid arthritis. Patient with recent URI symptoms with increased sinus congestion, crusting of the eyes and a productive cough since this past weekend. Denies fevers. Sputum with green discoloration. Patient has been evaluated by orthopedics and received a Right knee injection nov 2022 w/ some improvement of joint pain. She was also diagnosed with osteoarthritis. Smoking 1 ppd. Rheumatoid arthritis: Reportedly taking Plaquenil and prednisone 7.5mg daily    - stopped kevzara  --- rituxan started august 2022 -- - reported rash starting that evening along with severe hair loss and increased joint pains -- rash inovlement of the arms, chest, stomah regions. - rash lasting a couple days prior to resolving.  --- Bilateral knees, lower back. + Joint swelling   Arthraliga mild currently 1/10 over the pat week. Aggravating factors: cold exposure, weather changes, knee - wt bearing  Alleviating factors: norco, warmth, corticosteroid   Denies joint swelling or morning stiffness           REVIEW OF SYSTEMS: (ROS)    Review of Systems   Constitutional:  Negative for fatigue, fever and unexpected weight change. HENT:  Negative for congestion and trouble swallowing. Dry mouth   Eyes:  Negative for pain and itching. Respiratory:  Negative for cough and shortness of breath. Cardiovascular:  Negative for chest pain and leg swelling. Gastrointestinal:  Negative for abdominal pain, constipation, diarrhea and nausea. Endocrine: Negative for cold intolerance and heat intolerance. Genitourinary:  Negative for difficulty urinating, frequency and urgency. Musculoskeletal:  Positive for back pain. Negative for joint swelling. Skin:  Negative for rash. Neurological:  Negative for dizziness, weakness, numbness and headaches. Psychiatric/Behavioral:  The patient is not nervous/anxious.       PAST MEDICAL HISTORY      Past Medical History:   Diagnosis Date    Anemia     Arthritis     rheumatoid    Asthma     DJD (degenerative joint disease)     GERD (gastroesophageal reflux disease)     History of bleeding ulcers     Rheumatoid arthritis(714.0)        PAST SURGICAL HISTORY      Past Surgical History:   Procedure Laterality Date    CHOLECYSTECTOMY      COLONOSCOPY  11/08/2010    1CM RECURRENT SESSILE POLYP REMOVED AND CAUTERIZED    COLONOSCOPY  11/08/2010    5MM POLYP AT 20CM FROM ANAL VERGE  REMOVED AND BIOPSIED WITH COLD BIOPSY FORCEPS    HYSTERECTOMY, TOTAL ABDOMINAL (CERVIX REMOVED)  04/12/2011    JOINT REPLACEMENT Bilateral 05/05/2014    Dr Nuzhat Merritt  10/19/2015    removal of L4/L5 hardware and fusion of L3/L4 lumbar spine by Dr. Dede Flanagan Right 08/18/2011    SIJ    OTHER SURGICAL HISTORY  12/11/2012    caudal epidural    OTHER SURGICAL HISTORY Right 05/23/2013     L4 TFE    OTHER SURGICAL HISTORY Right 01/23/2014    HIP INJECTION    SPINE SURGERY  2010    Fusion and cage        FAMILY HISTORY      Family History   Problem Relation Age of Onset    Heart Disease Paternal Grandmother     High Blood Pressure Paternal Grandmother     Heart Disease Paternal Grandfather     High Blood Pressure Paternal Grandfather     Heart Disease Father     High Blood Pressure Father     Arthritis Father     Rheum Arthritis Father     Stroke Maternal Grandmother     Diabetes Maternal Grandmother     Stroke Maternal Grandfather     Diabetes Maternal Grandfather     Arthritis Daughter     Rheum Arthritis Mother        SOCIAL HISTORY          ALLERGIES     Allergies   Allergen Reactions    Avelox [Moxifloxacin]      Rash and edema    Bactrim [Sulfamethoxazole-Trimethoprim]      hives    Cefuroxime Axetil Nausea Only    Codeine      Nausea and rash    Sulfa Antibiotics Hives    [place] [Tofacitinib] Diarrhea     vomit       CURRENT MEDICATIONS      Current Outpatient Medications   Medication Sig Dispense Refill    denosumab (PROLIA) 60 MG/ML SOSY SC injection Inject 1 mL into the skin every 6 months 180 mL 1    predniSONE (DELTASONE) 2.5 MG tablet TAKE THREE TABLETS BY MOUTH EVERY DAY 90 tablet 3    hydroxychloroquine (PLAQUENIL) 200 MG tablet TAKE 1 TABLET BY MOUTH EVERY MORNING AND 1 TABLET EVERY NIGHT 60 tablet 5    Handicap Placard MISC by Does not apply route Expires May 2, 2027 1 each 0    Misc. Devices (WHEELCHAIR CUSHION) MISC Wheelchair cushion 1 each 0    HYDROcodone-acetaminophen (NORCO)  MG per tablet Take 1 tablet by mouth every 6 hours as needed for Pain.      montelukast (SINGULAIR) 10 MG tablet TAKE 1 TABLET BY MOUTH ONE TIME A DAY 90 tablet 3    diclofenac sodium 1 % GEL Apply topically nightly      traZODone (DESYREL) 100 MG tablet Take 1 tablet by mouth nightly 90 tablet 1    melatonin 5 MG TBDP disintegrating tablet Take 10 mg by mouth nightly      esomeprazole (NEXIUM) 40 MG delayed release capsule TAKE 1 CAPSULE BY MOUTH 2 TIMES A DAY 60 capsule 5    ondansetron (ZOFRAN ODT) 4 MG disintegrating tablet Take 1 tablet by mouth every 8 hours as needed for Nausea or Vomiting 30 tablet 2    lidocaine (LIDODERM) 5 % Place 1 patch onto the skin daily 12 hours on, 12 hours off.  30 patch 5    Compression Stockings MISC by Does not apply route Needs 20-30mmHg 6 each 0    calcium carbonate (OSCAL) 500 MG TABS tablet Take 500 mg by mouth daily Loratadine 10 MG CAPS Take by mouth      Cholecalciferol (VITAMIN D) 2000 UNITS CAPS capsule Take 2,000 Units by mouth 2 times daily      sarilumab (KEVZARA) 200 MG/1. 14ML SOAJ injection Inject 1.14 mLs into the skin every 14 days (Patient not taking: No sig reported) 2 pen 5    DULoxetine (CYMBALTA) 30 MG extended release capsule duloxetine 30 mg capsule,delayed release (Patient not taking: No sig reported)      metoprolol tartrate (LOPRESSOR) 50 MG tablet TAKE ONE-HALF TABLET BY MOUTH TWICE A DAY (Patient not taking: No sig reported) 30 tablet 0    Elastic Bandages & Supports (MEDICAL COMPRESSION STOCKINGS) MISC 1 each by NOT APPLICABLE route daily bilateral (Patient not taking: No sig reported)      Lift Chair MISC by Does not apply route (Patient not taking: No sig reported) 1 each 0    BD PEN NEEDLE JEFF U/F 32G X 4 MM MISC  (Patient not taking: No sig reported)      Lancets Misc. (ACCU-CHEK FASTCLIX LANCET) KIT  (Patient not taking: No sig reported)      diphenhydrAMINE HCl (BENADRYL ALLERGY PO) Take 25 mg by mouth nightly (Patient not taking: No sig reported)      blood glucose test strips (EXACTECH TEST) strip 1 strip by Other route 4 times daily       No current facility-administered medications for this visit. PHYSICAL EXAMINATION / OBJECTIVE   Objective:  /74 (Site: Left Upper Arm, Position: Sitting, Cuff Size: Medium Adult)   Pulse 87   Ht 5' 5\" (1.651 m)   Wt 179 lb (81.2 kg)   LMP 04/12/2012   SpO2 98%   BMI 29.79 kg/m²     Physical Exam  Vitals reviewed. Constitutional:       Appearance: She is well-developed. HENT:      Head: Normocephalic. Nose: Nose normal.   Cardiovascular:      Rate and Rhythm: Normal rate and regular rhythm. Pulmonary:      Effort: Pulmonary effort is normal.      Breath sounds: Wheezing present. Musculoskeletal:      Cervical back: Normal range of motion and neck supple. Skin:     General: Skin is warm and dry. Findings: No rash. Neurological:      Mental Status: She is alert and oriented to person, place, and time. Psychiatric:         Thought Content: Thought content normal.     Upper extremities:    SHOULDERS nontender  ELBOWS nontender  WRISTS  ROM intact   HANDS/FINGERS     MCPs fullnes right 2-3 mcp     PIPs nontender, + swelling right 3-5, left 2,3,4,5   + swan neck changes, distal left 2nd finger amputation    Lower extremities:  HIPS nontender  KNEES Non-tender   ANKLES tender left, no swelling  FEET : tender bilat MTPs,  swelling bilateral mtps + lateral deviation of toes. + bunions        LABS    CBC  Lab Results   Component Value Date/Time    WBC 6.3 10/18/2022 12:00 AM    RBC 4.72 10/18/2022 12:00 AM    HGB 12.4 10/18/2022 12:00 AM    HCT 38.8 10/18/2022 12:00 AM    MCV 82 10/18/2022 12:00 AM    MCH 26.2 10/18/2022 12:00 AM    MCHC 31.9 10/18/2022 12:00 AM    RDW 15.1 10/18/2022 12:00 AM     10/18/2022 12:00 AM       CMP  Lab Results   Component Value Date/Time    CALCIUM 9.2 10/18/2022 12:00 AM    LABALBU 3.8 10/18/2022 12:00 AM    PROT 6.7 11/10/2021 03:31 PM     10/18/2022 12:00 AM    K 3.3 10/18/2022 12:00 AM    CO2 32 10/18/2022 12:00 AM     10/18/2022 12:00 AM    BUN 8 10/18/2022 12:00 AM    CREATININE 0.69 10/18/2022 12:00 AM    ALKPHOS 53 10/18/2022 12:00 AM    ALT 14 10/18/2022 12:00 AM    AST 15 10/18/2022 12:00 AM     Lab Results   Component Value Date/Time    VITD25 103.5 10/12/2021 12:00 AM     Lab Results   Component Value Date    RF >360 10/12/2021       No components found for: CANCASCRN, APANCASCRN  Lab Results   Component Value Date    SEDRATE 70 10/18/2022     Lab Results   Component Value Date    CRP 0.8 10/18/2022       RADIOLOGY:     Narrative  Performed by MANUALLY TRANSCRIBED RESULTS  FEV1 is 83% predicted or 2.05 L with forced vital capacity 78% of   predicted or 2.43 L. FEF 25-75 is 104% predicted or 2.40 L. There is no   significant post bronchodilator response.    Slow vital capacity is very mildly reduced at 78% predicted or 2.40 L with   total lung capacity 89% of predicted or 4.66 L. diffusion capacity is   normal.     Impression:   Mild restrictive physiology is demonstrated. Gas transfer is intact. Please correlate with clinical and radiographic    ASSESSMENT/PLAN    Assessment   Plan     Seropositive rheumatoid arthritis +CCP >300, RF >360  Rheumatoid nodulosis  - diagnosed in 30's  - prior tx: methotrexate PO (nausea), subcu (LFT elevation), xeljanz (diarrhea and vomiting), humira (nausea), arava (hair loss), enbrel (rash, vomiting, vision changes), rituxan (rash, increased joint pains), kevzara (increased joint pain, abdominal pain) rituximab August 2022: Rash status post infusion   - plaquenil 200 mg BID   - prednisone 7.5 mg daily   - -- Start Azathioprine 50mg daily x 7 days then titrate to 50mg bid. W/ titration up to 2.5mg/kg dosing as tolerated . (for those without liver and renal disease)  -- side effects  of Azathioprine include but are not limited to liver injury , cancer, low blood counts (anemia, leukopenia, thrombocytopenia),  GI upset (diarrhea, nausea, vomiting) , Headaches, pancreatitis,  increased risk of post transplant lymphoma and hepatosplenic T-cell lymphoma . ---  instructed to avoid ETOH use.   --- she was instructed to stop Azathiprine at the onset of any infection and instructed to call me if concern for infection. - -- alt , orencia, kinaret,  rinvoq, baricitinib       Restrictive lung disease:   -  Mild restriction on PFTs. The patient developed respiratory symptoms we will pursue CT chest.  Can be related to body habitus, rheumatoid arthritis or other. Osteoarthritis of multiple joints  - likely 2/2 to #1    Chronic use of systemic steroids  Osteoporosis with current fracture  - on steroids for past 20 years.  DEXA 12/19/2019 w/ T score -3.6 right forearm   - prolia 60 mg subcu q 6 months - last dose 5/2022    - due for prolia but medication was not shipped to the office or the patient       Chronic midline low back pain with bilateral sciatica  Scoliosis  H/o cervicalgia and cervical spinal stenosis    Tobacco dependence  - smoking 1 PPD x 40 years    Medication monitoring   - CBC, CMP, sed rate, CRP q 4-6 months    - TB gold negative (10/12/21)      No follow-ups on file. Electronically signed by Domi Da Silva DO on 12/13/2022 at 3:35 PM    New Prescriptions    No medications on file       Thank you for allowing me to participate in the care of this patient. Please call if there are any questions.

## 2022-12-14 DIAGNOSIS — M05.79 RHEUMATOID ARTHRITIS INVOLVING MULTIPLE SITES WITH POSITIVE RHEUMATOID FACTOR (HCC): ICD-10-CM

## 2022-12-14 RX ORDER — PREDNISONE 2.5 MG
TABLET ORAL
Qty: 90 TABLET | Refills: 3 | Status: SHIPPED | OUTPATIENT
Start: 2022-12-14

## 2022-12-15 ENCOUNTER — TELEPHONE (OUTPATIENT)
Dept: RHEUMATOLOGY | Age: 57
End: 2022-12-15

## 2022-12-15 NOTE — TELEPHONE ENCOUNTER
Called [facility] spoke with pharmacy tech who stated they have attempted to text and e-mail pt a couple different times due to them refusing calls and no response as of yet. Pt MUST call them and set up delivery.  Attempted to call no answer Left a very detailed message on  phone (on [address]) informing as that's what was requested at pt office visit

## 2023-01-06 NOTE — TELEPHONE ENCOUNTER
Pt  called (on HIPAA) stating that they had a new pharmacy.  [facility] is the new pharmacy they are using. [place]: [id number]  PCN: [id number]  GRP: [id number]  ID: [id number]

## 2023-02-06 DIAGNOSIS — M05.79 RHEUMATOID ARTHRITIS INVOLVING MULTIPLE SITES WITH POSITIVE RHEUMATOID FACTOR (HCC): ICD-10-CM

## 2023-02-06 RX ORDER — HYDROXYCHLOROQUINE SULFATE 200 MG/1
TABLET, FILM COATED ORAL
Qty: 60 TABLET | Refills: 5 | Status: SHIPPED | OUTPATIENT
Start: 2023-02-06

## 2023-02-10 ENCOUNTER — TELEPHONE (OUTPATIENT)
Dept: RHEUMATOLOGY | Age: 58
End: 2023-02-10

## 2023-02-10 NOTE — TELEPHONE ENCOUNTER
Tracey Mckinney from [facility] called to confirm [facility] for pt. Confirmed delivery for Tuesday 2/14/23 AM delivery.

## 2023-02-14 ENCOUNTER — OFFICE VISIT (OUTPATIENT)
Dept: RHEUMATOLOGY | Age: 58
End: 2023-02-14

## 2023-02-14 VITALS
SYSTOLIC BLOOD PRESSURE: 110 MMHG | BODY MASS INDEX: 29.83 KG/M2 | HEIGHT: 65 IN | WEIGHT: 179.01 LBS | HEART RATE: 84 BPM | OXYGEN SATURATION: 95 % | DIASTOLIC BLOOD PRESSURE: 60 MMHG

## 2023-02-14 DIAGNOSIS — M80.00XA OSTEOPOROSIS WITH CURRENT PATHOLOGICAL FRACTURE, UNSPECIFIED OSTEOPOROSIS TYPE, INITIAL ENCOUNTER: ICD-10-CM

## 2023-02-14 DIAGNOSIS — J98.4 RESTRICTIVE LUNG DISEASE: ICD-10-CM

## 2023-02-14 DIAGNOSIS — M05.79 RHEUMATOID ARTHRITIS INVOLVING MULTIPLE SITES WITH POSITIVE RHEUMATOID FACTOR (HCC): Primary | ICD-10-CM

## 2023-02-14 DIAGNOSIS — R29.898 LEFT LEG WEAKNESS: ICD-10-CM

## 2023-02-14 DIAGNOSIS — M15.3 OTHER SECONDARY OSTEOARTHRITIS OF MULTIPLE SITES: ICD-10-CM

## 2023-02-14 DIAGNOSIS — M06.30 RHEUMATOID NODULES (HCC): ICD-10-CM

## 2023-02-14 DIAGNOSIS — G89.29 CHRONIC BILATERAL LOW BACK PAIN WITH RIGHT-SIDED SCIATICA: ICD-10-CM

## 2023-02-14 DIAGNOSIS — M54.41 CHRONIC BILATERAL LOW BACK PAIN WITH RIGHT-SIDED SCIATICA: ICD-10-CM

## 2023-02-14 DIAGNOSIS — Z51.81 MEDICATION MONITORING ENCOUNTER: ICD-10-CM

## 2023-02-14 DIAGNOSIS — F17.200 TOBACCO DEPENDENCE: ICD-10-CM

## 2023-02-14 RX ORDER — HYDROXYCHLOROQUINE SULFATE 200 MG/1
TABLET, FILM COATED ORAL
Qty: 60 TABLET | Refills: 5 | Status: SHIPPED | OUTPATIENT
Start: 2023-02-14

## 2023-02-14 RX ORDER — GABAPENTIN 400 MG/1
CAPSULE ORAL
COMMUNITY
Start: 2022-12-14

## 2023-02-14 RX ORDER — BENZONATATE 200 MG/1
CAPSULE ORAL
COMMUNITY
Start: 2022-12-15

## 2023-02-14 RX ORDER — PREDNISONE 2.5 MG
7.5 TABLET ORAL DAILY
Qty: 90 TABLET | Refills: 3 | Status: SHIPPED | OUTPATIENT
Start: 2023-02-14

## 2023-02-14 RX ORDER — ABATACEPT 125 MG/ML
125 INJECTION, SOLUTION SUBCUTANEOUS WEEKLY
Qty: 4 ML | Refills: 5 | Status: SHIPPED | OUTPATIENT
Start: 2023-02-14

## 2023-02-14 RX ORDER — AZATHIOPRINE 50 MG/1
TABLET ORAL
Qty: 60 TABLET | Refills: 0 | Status: SHIPPED | OUTPATIENT
Start: 2023-02-14

## 2023-02-14 RX ORDER — TIZANIDINE 4 MG/1
4 TABLET ORAL EVERY 8 HOURS PRN
COMMUNITY
Start: 2023-01-20

## 2023-02-14 ASSESSMENT — JOINT PAIN
TOTAL NUMBER OF TENDER JOINTS: 11
TOTAL NUMBER OF SWOLLEN JOINTS: 7

## 2023-02-14 ASSESSMENT — ENCOUNTER SYMPTOMS
DIARRHEA: 0
BACK PAIN: 1
ABDOMINAL PAIN: 0
SHORTNESS OF BREATH: 0
COUGH: 0
EYE ITCHING: 0
NAUSEA: 0
EYE PAIN: 0
TROUBLE SWALLOWING: 0
CONSTIPATION: 0

## 2023-02-14 NOTE — PROGRESS NOTES
[facility] RHEUMATOLOGY FOLLOW UP NOTE       Date Of Service: 2/14/2023  Provider: Markie Conley DO    Name: April Negron   MRN: [id number]    Chief Complaint(s)     Chief Complaint   Patient presents with    Follow-up     2 mo f/u, Rheumatoid arthritis involving multiple sites with positive rheumatoid factor ([facility] [address]    Also here to get Prolia inj    Pt stated pain 8/10 - L side, bilateral hands, R knee        History of Present Illness (HPI)     April Negron  is a(n)57 y.o. female with a hx of chronic low back pain, osteoporosis, rheumatoid arthritis, osteoarthritis of multiple joints, chronic tobacco dependence, osteoarthritis bilat hips s/p bilat THR here for the f/u evaluation of rheumatoid arthritis. , osteoporosis. Worsening lower back pain since dec. 2022- denies falls or near falls. - no falls reported . Occurring around joy with putting up joy ornaments and coughing. Back pain associated with radicular pain in the Right leg with sitting . Aggravated with standing on the left leg. , wt bearing, walking, improved with non-wt bearing. Denies saddle numbness , incontinence. Rheumatoid Arthritis  / osteoarthritis    Azathioprine tolerated - started taking consistent around 3 weeks ago   Worsening hands pain with cramping and decreased  strenght - limited ROm of hands. Currently with pain in the hands, wrists, lower back, knees, bilateral ankles, right foot. Pain up to 10/10 most severe in the evenings in the lower back. Aggravating: weather changes. . Knees and back - wt bearing. Back - movement. Alleviating: no relief. New nodules along the Right 1st DIP. Smoking 1 ppd. REVIEW OF SYSTEMS: (ROS)    Review of Systems   Constitutional:  Negative for fatigue, fever and unexpected weight change. HENT:  Negative for congestion and trouble swallowing. Dry mouth   Eyes:  Negative for pain and itching.    Respiratory:  Negative for cough and shortness of breath. Cardiovascular:  Negative for chest pain and leg swelling. Gastrointestinal:  Negative for abdominal pain, constipation, diarrhea and nausea. Endocrine: Negative for cold intolerance and heat intolerance. Genitourinary:  Negative for difficulty urinating, frequency and urgency. Musculoskeletal:  Positive for back pain. Negative for joint swelling. Skin:  Negative for rash. Neurological:  Negative for dizziness, weakness, numbness and headaches. Psychiatric/Behavioral:  The patient is not nervous/anxious.       PAST MEDICAL HISTORY      Past Medical History:   Diagnosis Date    Anemia     Arthritis     rheumatoid    Asthma     DJD (degenerative joint disease)     GERD (gastroesophageal reflux disease)     History of bleeding ulcers     Rheumatoid arthritis(714.0)        PAST SURGICAL HISTORY      Past Surgical History:   Procedure Laterality Date    CHOLECYSTECTOMY      COLONOSCOPY  11/08/2010    1CM RECURRENT SESSILE POLYP REMOVED AND CAUTERIZED    COLONOSCOPY  11/08/2010    5MM POLYP AT 20CM FROM ANAL VERGE  REMOVED AND BIOPSIED WITH COLD BIOPSY FORCEPS    HYSTERECTOMY, TOTAL ABDOMINAL (CERVIX REMOVED)  04/12/2011    JOINT REPLACEMENT Bilateral 05/05/2014    Dr Chris Nieto  10/19/2015    removal of L4/L5 hardware and fusion of L3/L4 lumbar spine by Dr. Nerissa Crigler Right 08/18/2011    SIJ    OTHER SURGICAL HISTORY  12/11/2012    caudal epidural    OTHER SURGICAL HISTORY Right 05/23/2013     L4 TFE    OTHER SURGICAL HISTORY Right 01/23/2014    HIP INJECTION    SPINE SURGERY  2010    Fusion and cage        FAMILY HISTORY      Family History   Problem Relation Age of Onset    Heart Disease Paternal Grandmother     High Blood Pressure Paternal Grandmother     Heart Disease Paternal Grandfather     High Blood Pressure Paternal Grandfather     Heart Disease Father     High Blood Pressure Father     Arthritis Father     Rheum Arthritis Father     Stroke Maternal Grandmother     Diabetes Maternal Grandmother     Stroke Maternal Grandfather     Diabetes Maternal Grandfather     Arthritis Daughter     Rheum Arthritis Mother        SOCIAL HISTORY          ALLERGIES     Allergies   Allergen Reactions    Avelox [Moxifloxacin]      Rash and edema    Bactrim [Sulfamethoxazole-Trimethoprim]      hives    Cefuroxime Axetil Nausea Only    Codeine      Nausea and rash    Sulfa Antibiotics Hives    Lark Gamma [Tofacitinib] Diarrhea     vomit       CURRENT MEDICATIONS      Current Outpatient Medications   Medication Sig Dispense Refill    benzonatate (TESSALON) 200 MG capsule       tiZANidine (ZANAFLEX) 4 MG tablet Take 4 mg by mouth every 8 hours as needed      gabapentin (NEURONTIN) 400 MG capsule       hydroxychloroquine (PLAQUENIL) 200 MG tablet TAKE ONE TABLET BY MOUTH EVERY MORNING AND 1 TABLET EVERY NIGHT 60 tablet 5    predniSONE (DELTASONE) 2.5 MG tablet TAKE THREE TABLETS BY MOUTH EVERY DAY 90 tablet 3    azaTHIOprine (IMURAN) 50 MG tablet Take 1 tablet daily x 7 days then increase to 1 tablet twice daily 60 tablet 0    denosumab (PROLIA) 60 MG/ML SOSY SC injection Inject 1 mL into the skin every 6 months 180 mL 1    Handicap Placard MISC by Does not apply route Expires May 2, 2027 1 each 0    Misc.  Devices (WHEELCHAIR CUSHION) MISC Wheelchair cushion 1 each 0    HYDROcodone-acetaminophen (NORCO)  MG per tablet Take 1 tablet by mouth every 6 hours as needed for Pain.      montelukast (SINGULAIR) 10 MG tablet TAKE 1 TABLET BY MOUTH ONE TIME A DAY 90 tablet 3    diclofenac sodium 1 % GEL Apply topically nightly      traZODone (DESYREL) 100 MG tablet Take 1 tablet by mouth nightly 90 tablet 1    melatonin 5 MG TBDP disintegrating tablet Take 10 mg by mouth nightly      esomeprazole (NEXIUM) 40 MG delayed release capsule TAKE 1 CAPSULE BY MOUTH 2 TIMES A DAY 60 capsule 5    ondansetron (ZOFRAN ODT) 4 MG disintegrating tablet Take 1 tablet by mouth every 8 hours as needed for Nausea or Vomiting 30 tablet 2    Compression Stockings MISC by Does not apply route Needs 20-30mmHg 6 each 0    Loratadine 10 MG CAPS Take by mouth      Cholecalciferol (VITAMIN D) 2000 UNITS CAPS capsule Take 2,000 Units by mouth 2 times daily      diphenhydrAMINE HCl (BENADRYL ALLERGY PO) Take 25 mg by mouth nightly (Patient not taking: Reported on 2/14/2023)      blood glucose test strips (EXACTECH TEST) strip 1 strip by Other route 4 times daily      lidocaine (LIDODERM) 5 % Place 1 patch onto the skin daily 12 hours on, 12 hours off. 30 patch 5    calcium carbonate (OSCAL) 500 MG TABS tablet Take 500 mg by mouth daily       No current facility-administered medications for this visit. PHYSICAL EXAMINATION / OBJECTIVE   Objective:  /60 (Site: Left Upper Arm, Position: Sitting, Cuff Size: Large Adult)   Pulse 84   Ht 5' 5\" (1.651 m)   Wt 179 lb 0.2 oz (81.2 kg)   LMP 04/12/2012   SpO2 95%   BMI 29.79 kg/m²     Physical Exam  Vitals reviewed. Constitutional:       Appearance: She is well-developed. HENT:      Head: Normocephalic. Nose: Nose normal.   Cardiovascular:      Rate and Rhythm: Normal rate and regular rhythm. Pulmonary:      Effort: Pulmonary effort is normal.      Breath sounds: Wheezing present. Musculoskeletal:      Cervical back: Normal range of motion and neck supple. Skin:     General: Skin is warm and dry. Findings: No rash. Neurological:      Mental Status: She is alert and oriented to person, place, and time. Deep Tendon Reflexes: Reflexes abnormal.      Reflex Scores:       Patellar reflexes are 0 on the right side and 0 on the left side. Achilles reflexes are 0 on the right side and 0 on the left side. Psychiatric:         Thought Content:  Thought content normal.     Upper extremities:    SHOULDERS nontender  ELBOWS nontender  WRISTS  ROM intact   HANDS/FINGERS     MCPs fullnes right 2-3 mcp     PIPs  + swelling right 3-5, left + swan neck changes, distal left 2nd finger amputation    Lower extremities: weakness left hip flexor, knee flex/ext, foot dorsiflexion (4/5)   HIPS nontender  KNEES Non-tender   ANKLES tender left, no swelling  FEET : tender bilat MTPs,  swelling bilateral mtps + lateral deviation of toes. + bunions       Spine : tender left > right lumbar spine, + thoraco lumbar scoliosis. Physical Exam        PRABHAKAR-28 (ESR): 5.71 (High disease activity)  PRABHAKAR-28 (CRP): 3.91 (Moderate disease activity)          LABS    CBC  Lab Results   Component Value Date/Time    WBC 6.3 10/18/2022 12:00 AM    RBC 4.72 10/18/2022 12:00 AM    HGB 12.4 10/18/2022 12:00 AM    HCT 38.8 10/18/2022 12:00 AM    MCV 82 10/18/2022 12:00 AM    MCH 26.2 10/18/2022 12:00 AM    MCHC 31.9 10/18/2022 12:00 AM    RDW 15.1 10/18/2022 12:00 AM     10/18/2022 12:00 AM       CMP  Lab Results   Component Value Date/Time    CALCIUM 9.2 10/18/2022 12:00 AM    LABALBU 3.8 10/18/2022 12:00 AM    PROT 6.7 11/10/2021 03:31 PM     10/18/2022 12:00 AM    K 3.3 10/18/2022 12:00 AM    CO2 32 10/18/2022 12:00 AM     10/18/2022 12:00 AM    BUN 8 10/18/2022 12:00 AM    CREATININE 0.69 10/18/2022 12:00 AM    ALKPHOS 53 10/18/2022 12:00 AM    ALT 14 10/18/2022 12:00 AM    AST 15 10/18/2022 12:00 AM     Lab Results   Component Value Date/Time    VITD25 103.5 10/12/2021 12:00 AM     Lab Results   Component Value Date    RF >360 10/12/2021       No components found for: CANCASCRN, APANCASCRN  Lab Results   Component Value Date    SEDRATE 70 10/18/2022     Lab Results   Component Value Date    CRP 0.8 10/18/2022       RADIOLOGY:     Narrative  Performed by MANUALLY TRANSCRIBED RESULTS  FEV1 is 83% predicted or 2.05 L with forced vital capacity 78% of   predicted or 2.43 L. FEF 25-75 is 104% predicted or 2.40 L. There is no   significant post bronchodilator response.    Slow vital capacity is very mildly reduced at 78% predicted or 2.40 L with   total lung capacity 89% of predicted or 4.66 L. diffusion capacity is   normal.     Impression:   Mild restrictive physiology is demonstrated. Gas transfer is intact. Please correlate with clinical and radiographic      Donna Pérez MD - 02/07/2023   Formatting of this note might be different from the original.   THORACIC SPINE, 3 VIEWS     CLINICAL STATEMENT: Mid back pain. TECHNIQUE: 3 views including AP view, lateral view and localized lateral view at cervical thoracic junction. FINDINGS:  The heights of the vertebral bodies are unchanged since the previous study from 10/12/2021. There is dextroscoliosis of the thoracic spine the angle measures 31 degrees. Multilevel degenerative disc disease more prominent in the mid and lower thoracic spine. The visualized part of the lung and mediastinum is unremarkable. The bowel gas pattern is unremarkable. IMPRESSION:     There is dextroscoliosis of the thoracic spine the angle measures 31 degrees. Multilevel degenerative disc disease more prominent in the mid and lower thoracic spine. Workstation:[id number]     Finalized by Aleksandra Ponce MD on 2/7/2023 7:27 PM    ASSESSMENT/PLAN    Assessment   Plan     Seropositive rheumatoid arthritis +CCP >300, RF >360 -- active w/ continued synovitis & nodulosis   Rheumatoid nodulosis  - diagnosed in 30's  - prior tx: methotrexate PO (nausea), subcu (LFT elevation), xeljanz (diarrhea and vomiting), humira (nausea), arava (hair loss), enbrel (rash, vomiting, vision changes), rituxan (rash, fever, increased joint pains), kevzara (increased joint pain, abdominal pain) rituximab August 2022: Rash status post infusion   - plaquenil 200 mg BID   - prednisone 7.5 mg daily   - increase Azathioprine 150 mg daily    - start orencia 125mg subcu weekly Started ORENCIA --- 2/14/2023   - CTLA 4 inhibition -- prevention of T-cell activation by prevention of costimulatory signal needed for T-cell activation.   -- we discussed both IV q month and weekly subq injection formulations . -- side effects of Orencia and include but are not limited to malignancy, serious infections, sepsis, pneumonia, allergic reaction (anaphylaxis Kirk-Abdias's), infusion reactions: Multiple sclerosis, nausea, cough, high blood pressure, diarrhea, fever, abdominal pain, rash, injection site reactions, worsening of COPD.     - -- alt , , kinaret,  rinvoq, baricitinib       Worsening  Chronic midline low back pain with bilateral sciatica  Scoliosis - T spine - w/ 31 degree curvature. - weakness left leg, abnoraml DTR   - x-ray and MRI ordered b/c of the neurologic defects.    - would like to refer to phys therapy if there is significant abnoramlities on x-ray    - dme order for rolling walker provided. Restrictive lung disease:   --  Mild restriction on PFTs. Lungs are cta .   --  Can be related to body habitus, rheumatoid arthritis or other. Osteoarthritis of multiple joints - likely 2/2 to #1    Chronic use of systemic steroids  Osteoporosis with current fracture  - on steroids for past 20 years. DEXA 12/19/2019 w/ T score -3.6 right forearm   - prolia 60 mg subcu q 6 months  last dosing 5/2022. -  prolia shot  given today 2/14/23     Tobacco dependence --- smoking 1 PPD > 40 years    Medication monitoring   - CBC, CMP, sed rate, CRP q 4 weeks. - TB gold negative (10/12/21)      No follow-ups on file. Electronically signed by Markie Conley DO on 2/14/2023 at 4:13 PM    New Prescriptions    No medications on file       Thank you for allowing me to participate in the care of this patient. Please call if there are any questions.

## 2023-02-16 ENCOUNTER — TELEPHONE (OUTPATIENT)
Dept: RHEUMATOLOGY | Age: 58
End: 2023-02-16

## 2023-02-16 NOTE — PROGRESS NOTES
Administrations This Visit       denosumab (PROLIA) SC injection 60 mg       Admin Date  02/14/2023 Action  Given Dose  60 mg Route  SubCUTAneous Administered By  Liya Maxwell, COLEEN                      Injection given, pt tolerated well.

## 2023-02-22 ENCOUNTER — TELEPHONE (OUTPATIENT)
Dept: RHEUMATOLOGY | Age: 58
End: 2023-02-22

## 2023-02-22 DIAGNOSIS — G89.29 CHRONIC BILATERAL LOW BACK PAIN WITH RIGHT-SIDED SCIATICA: ICD-10-CM

## 2023-02-22 DIAGNOSIS — M05.79 RHEUMATOID ARTHRITIS INVOLVING MULTIPLE SITES WITH POSITIVE RHEUMATOID FACTOR (HCC): Primary | ICD-10-CM

## 2023-02-22 DIAGNOSIS — R29.898 LEFT LEG WEAKNESS: ICD-10-CM

## 2023-02-22 DIAGNOSIS — M54.41 CHRONIC BILATERAL LOW BACK PAIN WITH RIGHT-SIDED SCIATICA: ICD-10-CM

## 2023-02-22 NOTE — TELEPHONE ENCOUNTER
Pt called and stated that she is already in a wheelchair and she states that at her apt it was discussed that a script for a walker/wheelchair combo would be ordered.

## 2023-02-22 NOTE — TELEPHONE ENCOUNTER
[address] [place] [address] called and stated that the pt's  told them he needed a wheelchair. So they are wondering if the script can be changed from a walker to a wheelchair. Please advise.

## 2023-02-22 NOTE — TELEPHONE ENCOUNTER
Diagnosis Orders   1. Rheumatoid arthritis involving multiple sites with positive rheumatoid factor ([facility] [address]  DME Order for [facility] as OP      2. Chronic bilateral low back pain with right-sided sciatica  DME Order for Wheel Chair Cushion as OP      3.  Left leg weakness  DME Order for Wheel Chair Cushion as OP

## 2023-02-24 DIAGNOSIS — M54.41 CHRONIC BILATERAL LOW BACK PAIN WITH RIGHT-SIDED SCIATICA: ICD-10-CM

## 2023-02-24 DIAGNOSIS — R29.898 LEFT LEG WEAKNESS: ICD-10-CM

## 2023-02-24 DIAGNOSIS — G89.29 CHRONIC BILATERAL LOW BACK PAIN WITH RIGHT-SIDED SCIATICA: ICD-10-CM

## 2023-02-27 ENCOUNTER — TELEPHONE (OUTPATIENT)
Dept: RHEUMATOLOGY | Age: 58
End: 2023-02-27

## 2023-02-27 NOTE — TELEPHONE ENCOUNTER
Received fax from insurance stating patient was approved for Julia Liao through 7/27/2023. Sent to mandated pharmacy [facility].  Attached PA Approval

## 2023-02-27 NOTE — TELEPHONE ENCOUNTER
Pt called in stating that her insurance company won't approve the [place]. Will route to Alma Welch as well. Pt stated she doesn't think she really needs the shot, the azathioprine seems to be working well. PT wants to know how you'd like to proceed. Also informed pt that she needs to let her  know that when I attempted to call to do a verbal PA for her MRI that her insurance company couldn't find the insurance. Informed that he needs to contact HR as there is nothing more I can do at this point. Stated understanding.

## 2023-02-28 NOTE — TELEPHONE ENCOUNTER
Spoke with pt  this morning regarding insurance issues with MRI.  Also informed him of the orencia being sent to her pharmacy, stated understanding

## 2023-03-13 ENCOUNTER — TELEPHONE (OUTPATIENT)
Dept: RHEUMATOLOGY | Age: 58
End: 2023-03-13

## 2023-03-13 NOTE — TELEPHONE ENCOUNTER
Suly Thomas is calling she is requesting a call back, to discuss her medications, either with [facility] or a nurse.

## 2023-03-14 NOTE — TELEPHONE ENCOUNTER
Ok to pursue the [facility].  Out of all the medications, this has the lowest infection potential.

## 2023-03-14 NOTE — TELEPHONE ENCOUNTER
Pt stated she is losing hair and she believes it is coming from azathioprine. She noticed about 2 weeks ago. She is currently taking 1 tablet daily.

## 2023-03-14 NOTE — TELEPHONE ENCOUNTER
[address] has some concerns with her starting orencia. She stated she gets bronchitis at least twice a year with severe asthma. I did advise her if she had to take antibiotics she would need to hold the medication. She would like to know if the medication is ok to use or is there other options?

## 2023-03-15 NOTE — TELEPHONE ENCOUNTER
Notified pt regarding providers response to the medication. I advise her to call her insurance company regarding issues with getting the MRI covered. The insurance company is stating they cannot find them in there system. She is going to have her  reach out to  and there insurance company to clarify if they have coverage.

## 2023-03-17 NOTE — TELEPHONE ENCOUNTER
Patient and  Lily Solomon calling back in regarding this. Lily Sterling stated his employer's [address] reached out to [employer] and told them prior auth for an MRI is not needed. Please also refax mri order to [facility] in [facility] [place], patient was told it was not received. Please call Lily Sterling if need any further information or to confirm.

## 2023-04-03 ENCOUNTER — TELEPHONE (OUTPATIENT)
Dept: RHEUMATOLOGY | Age: 58
End: 2023-04-03

## 2023-04-03 NOTE — TELEPHONE ENCOUNTER
Patient has spoken with Dr Hugh Beltran or Johnny about a prescription for a new wheelchair to be sent to [facility] a few weeks ago. [facility] and [facility] never reiceved that order/prescription. Please fax again.

## 2023-04-03 NOTE — TELEPHONE ENCOUNTER
Can you please call patient and see if she is OK with discussing this at her upcoming appointment? I am more than happy to provide prescription, however insurance will require a face to face meeting and documentation for them to approve the wheelchair.

## 2023-04-03 NOTE — TELEPHONE ENCOUNTER
Dr. Yassine Nugent- I see where you ordered a walker and a wheelchair cushion at her last visit- did you mean wheelchair? They need the wheelchair order and the DME phrase addended to your note.

## 2023-04-18 ENCOUNTER — OFFICE VISIT (OUTPATIENT)
Dept: RHEUMATOLOGY | Age: 58
End: 2023-04-18
Payer: COMMERCIAL

## 2023-04-18 VITALS
SYSTOLIC BLOOD PRESSURE: 124 MMHG | HEIGHT: 65 IN | OXYGEN SATURATION: 95 % | BODY MASS INDEX: 29.82 KG/M2 | WEIGHT: 179 LBS | HEART RATE: 85 BPM | DIASTOLIC BLOOD PRESSURE: 76 MMHG

## 2023-04-18 DIAGNOSIS — M05.79 RHEUMATOID ARTHRITIS INVOLVING MULTIPLE SITES WITH POSITIVE RHEUMATOID FACTOR (HCC): Primary | ICD-10-CM

## 2023-04-18 DIAGNOSIS — J98.4 RESTRICTIVE LUNG DISEASE: ICD-10-CM

## 2023-04-18 DIAGNOSIS — M06.30 RHEUMATOID NODULES (HCC): ICD-10-CM

## 2023-04-18 PROCEDURE — 3017F COLORECTAL CA SCREEN DOC REV: CPT | Performed by: INTERNAL MEDICINE

## 2023-04-18 PROCEDURE — 99214 OFFICE O/P EST MOD 30 MIN: CPT | Performed by: INTERNAL MEDICINE

## 2023-04-18 PROCEDURE — G8419 CALC BMI OUT NRM PARAM NOF/U: HCPCS | Performed by: INTERNAL MEDICINE

## 2023-04-18 PROCEDURE — G8427 DOCREV CUR MEDS BY ELIG CLIN: HCPCS | Performed by: INTERNAL MEDICINE

## 2023-04-18 PROCEDURE — 4004F PT TOBACCO SCREEN RCVD TLK: CPT | Performed by: INTERNAL MEDICINE

## 2023-04-18 RX ORDER — AZATHIOPRINE 50 MG/1
100 TABLET ORAL DAILY
Qty: 60 TABLET | Refills: 0 | Status: SHIPPED | OUTPATIENT
Start: 2023-04-18

## 2023-04-18 RX ORDER — ABATACEPT 125 MG/ML
125 INJECTION, SOLUTION SUBCUTANEOUS WEEKLY
Qty: 3.92 ML | Refills: 3 | Status: SHIPPED | OUTPATIENT
Start: 2023-04-18

## 2023-04-18 ASSESSMENT — ENCOUNTER SYMPTOMS
BACK PAIN: 1
DIARRHEA: 0
COUGH: 0
EYE ITCHING: 0
CONSTIPATION: 0
NAUSEA: 0
ABDOMINAL PAIN: 0
SHORTNESS OF BREATH: 0
EYE PAIN: 0
TROUBLE SWALLOWING: 0

## 2023-04-18 NOTE — PROGRESS NOTES
and urgency. Musculoskeletal:  Positive for back pain. Negative for joint swelling. Skin:  Negative for rash. Neurological:  Negative for dizziness, weakness, numbness and headaches. Psychiatric/Behavioral:  The patient is not nervous/anxious. PmHx:  has a past medical history of Anemia, Arthritis, Asthma, DJD (degenerative joint disease), GERD (gastroesophageal reflux disease), History of bleeding ulcers, and Rheumatoid arthritis(714.0). Social History:  reports that she has been smoking cigarettes. She started smoking about 43 years ago. She has a 40.00 pack-year smoking history. She has never used smokeless tobacco. She reports that she does not drink alcohol and does not use drugs. Allergies   Allergen Reactions    Avelox [Moxifloxacin]      Rash and edema    Bactrim [Sulfamethoxazole-Trimethoprim]      hives    Cefuroxime Axetil Nausea Only    Codeine      Nausea and rash    Sulfa Antibiotics Hives    Xeljanz [Tofacitinib] Diarrhea     vomit       CURRENT MEDICATIONS      Current Outpatient Medications:     benzonatate (TESSALON) 200 MG capsule, , Disp: , Rfl:     tiZANidine (ZANAFLEX) 4 MG tablet, Take 1 tablet by mouth every 8 hours as needed, Disp: , Rfl:     gabapentin (NEURONTIN) 400 MG capsule, , Disp: , Rfl:     hydroxychloroquine (PLAQUENIL) 200 MG tablet, TAKE ONE TABLET BY MOUTH EVERY MORNING AND 1 TABLET EVERY NIGHT, Disp: 60 tablet, Rfl: 5    predniSONE (DELTASONE) 2.5 MG tablet, Take 3 tablets by mouth daily, Disp: 90 tablet, Rfl: 3    Handicap Placard MISC, by Does not apply route Expires May 2, 2027, Disp: 1 each, Rfl: 0    Misc.  Devices (WHEELCHAIR CUSHION) MISC, Wheelchair cushion, Disp: 1 each, Rfl: 0    HYDROcodone-acetaminophen (NORCO)  MG per tablet, Take 1 tablet by mouth every 6 hours as needed for Pain., Disp: , Rfl:     montelukast (SINGULAIR) 10 MG tablet, TAKE 1 TABLET BY MOUTH ONE TIME A DAY, Disp: 90 tablet, Rfl: 3    diclofenac sodium 1 % GEL, Apply

## 2023-04-25 ENCOUNTER — TELEPHONE (OUTPATIENT)
Dept: RHEUMATOLOGY | Age: 58
End: 2023-04-25

## 2023-04-25 DIAGNOSIS — M54.41 CHRONIC BILATERAL LOW BACK PAIN WITH RIGHT-SIDED SCIATICA: ICD-10-CM

## 2023-04-25 DIAGNOSIS — G89.29 CHRONIC BILATERAL LOW BACK PAIN WITH RIGHT-SIDED SCIATICA: ICD-10-CM

## 2023-04-25 DIAGNOSIS — R29.898 LEFT LEG WEAKNESS: ICD-10-CM

## 2023-04-25 NOTE — TELEPHONE ENCOUNTER
----- Message from Nayeli Barbosa DO sent at 4/25/2023  1:08 PM EDT -----  The MRI of the lower back with degenerative arthritic changes, prior surgical fusion along with area of severe foraminal stenosis which can result in radicular symptoms (pain radiating down the extremity).   There is no evidence of spinal stenosis (compression of the spinal cord)

## 2023-05-22 ENCOUNTER — TELEPHONE (OUTPATIENT)
Dept: RHEUMATOLOGY | Age: 58
End: 2023-05-22

## 2023-05-22 NOTE — TELEPHONE ENCOUNTER
Patient's spouse is calling regarding orders for a new wheelchair for [facility]. He said he seems to have misplaced the orders and is wanting to know if they can be faxed to the DME company.     [employer]  Fax # [phone]: Ursula Robertson

## 2023-07-11 ENCOUNTER — OFFICE VISIT (OUTPATIENT)
Dept: RHEUMATOLOGY | Age: 58
End: 2023-07-11
Payer: COMMERCIAL

## 2023-07-11 VITALS
BODY MASS INDEX: 29.99 KG/M2 | HEIGHT: 65 IN | OXYGEN SATURATION: 98 % | DIASTOLIC BLOOD PRESSURE: 60 MMHG | WEIGHT: 180 LBS | HEART RATE: 87 BPM | SYSTOLIC BLOOD PRESSURE: 100 MMHG

## 2023-07-11 DIAGNOSIS — M05.79 RHEUMATOID ARTHRITIS INVOLVING MULTIPLE SITES WITH POSITIVE RHEUMATOID FACTOR (HCC): Primary | ICD-10-CM

## 2023-07-11 DIAGNOSIS — Z51.81 MEDICATION MONITORING ENCOUNTER: ICD-10-CM

## 2023-07-11 DIAGNOSIS — G89.29 CHRONIC BILATERAL LOW BACK PAIN WITH RIGHT-SIDED SCIATICA: ICD-10-CM

## 2023-07-11 DIAGNOSIS — R05.8 PRODUCTIVE COUGH: ICD-10-CM

## 2023-07-11 DIAGNOSIS — M54.41 CHRONIC BILATERAL LOW BACK PAIN WITH RIGHT-SIDED SCIATICA: ICD-10-CM

## 2023-07-11 DIAGNOSIS — J98.4 RESTRICTIVE LUNG DISEASE: ICD-10-CM

## 2023-07-11 DIAGNOSIS — M06.30 RHEUMATOID NODULES (HCC): ICD-10-CM

## 2023-07-11 PROCEDURE — 99215 OFFICE O/P EST HI 40 MIN: CPT | Performed by: INTERNAL MEDICINE

## 2023-07-11 PROCEDURE — 3017F COLORECTAL CA SCREEN DOC REV: CPT | Performed by: INTERNAL MEDICINE

## 2023-07-11 PROCEDURE — 4004F PT TOBACCO SCREEN RCVD TLK: CPT | Performed by: INTERNAL MEDICINE

## 2023-07-11 PROCEDURE — G8427 DOCREV CUR MEDS BY ELIG CLIN: HCPCS | Performed by: INTERNAL MEDICINE

## 2023-07-11 PROCEDURE — G8419 CALC BMI OUT NRM PARAM NOF/U: HCPCS | Performed by: INTERNAL MEDICINE

## 2023-07-11 ASSESSMENT — ENCOUNTER SYMPTOMS
NAUSEA: 0
EYE ITCHING: 0
SHORTNESS OF BREATH: 0
CONSTIPATION: 0
DIARRHEA: 0
BACK PAIN: 1
EYE PAIN: 0
TROUBLE SWALLOWING: 0
COUGH: 0
ABDOMINAL PAIN: 0

## 2023-07-11 NOTE — PROGRESS NOTES
[facility] RHEUMATOLOGY FOLLOW UP NOTE       Date Of Service: 7/11/2023  Provider: Katelin Goode DO    Name: Bouchra Fitzpatrick   MRN: [id number]    Chief Complaint(s)     Chief Complaint   Patient presents with    Follow-up     2 month f/u RA        History of Present Illness (HPI)     Bouchra Fitzpatrick  is a(n)57 y.o. female with a hx of chronic low back pain, osteoporosis, rheumatoid arthritis, osteoarthritis of multiple joints, chronic tobacco dependence, osteoarthritis bilat hips s/p bilat THR here for the f/u evaluation of rheumatoid arthritis. , osteoporosis. Productive cough for the past 2-3 weeks - treated w/ steroid taper w/ some relief. + chills . Sputum - green to yellow. Always feeling cold. Denies fevers, sweats. Night sweats. Rheumatoid Arthritis  / osteoarthritis  /low back pain  -- Azathioprine stopped b/c reported GI upset. -- orencia stopped after 3rd dose- b/c really bad stomach pain per patient. -- ongoing joint pains in the bck, left ankle and foot. Pain up to 6/10 over the past week. Aggravating: weather changes. . Back - movement. , wt bearing, - Alleviating: no relief. - tinling of feet. - Continued swelling in the bilateral hands      Smoking 1 ppd pper patient but > 1 ppd per . REVIEW OF SYSTEMS: (ROS)    Review of Systems   Constitutional:  Negative for fatigue, fever and unexpected weight change. HENT:  Negative for congestion and trouble swallowing. Dry mouth   Eyes:  Negative for pain and itching. Respiratory:  Negative for cough and shortness of breath. Cardiovascular:  Negative for chest pain and leg swelling. Gastrointestinal:  Negative for abdominal pain, constipation, diarrhea and nausea. Endocrine: Negative for cold intolerance and heat intolerance. Genitourinary:  Negative for difficulty urinating, frequency and urgency. Musculoskeletal:  Positive for back pain. Negative for joint swelling. Skin:  Negative for rash.

## 2023-07-25 ENCOUNTER — TELEPHONE (OUTPATIENT)
Dept: RHEUMATOLOGY | Age: 58
End: 2023-07-25

## 2023-08-03 ENCOUNTER — TELEPHONE (OUTPATIENT)
Dept: RHEUMATOLOGY | Age: 58
End: 2023-08-03

## 2023-08-03 DIAGNOSIS — M05.79 RHEUMATOID ARTHRITIS INVOLVING MULTIPLE SITES WITH POSITIVE RHEUMATOID FACTOR (HCC): ICD-10-CM

## 2023-08-03 NOTE — TELEPHONE ENCOUNTER
Spoke with Jaja from [facility] and everything was noted. She did state the script has ended and they will need a new.

## 2023-08-03 NOTE — TELEPHONE ENCOUNTER
Received a call from Paty Aj at [facility]. She stated the pt medication is being processed (cimzia) for 200 mg. She also stated the PA was submitted for the pt to have a loading dose and maintenance dose.  Please advise

## 2023-08-03 NOTE — TELEPHONE ENCOUNTER
Castillo Shah called to see if pt has completed loading dose. I reached out to the pt and she received the loading dose today. Detailed message was left on the pharmacy voicemail.

## 2023-08-07 DIAGNOSIS — M05.79 RHEUMATOID ARTHRITIS INVOLVING MULTIPLE SITES WITH POSITIVE RHEUMATOID FACTOR (HCC): ICD-10-CM

## 2023-08-07 DIAGNOSIS — J98.4 RESTRICTIVE LUNG DISEASE: ICD-10-CM

## 2023-08-07 DIAGNOSIS — R05.8 PRODUCTIVE COUGH: ICD-10-CM

## 2023-08-10 ENCOUNTER — APPOINTMENT (OUTPATIENT)
Dept: CT IMAGING | Age: 58
DRG: 282 | End: 2023-08-10
Payer: COMMERCIAL

## 2023-08-10 ENCOUNTER — HOSPITAL ENCOUNTER (INPATIENT)
Age: 58
LOS: 1 days | Discharge: ANOTHER HOSPITAL | DRG: 282 | End: 2023-08-11
Attending: EMERGENCY MEDICINE | Admitting: INTERNAL MEDICINE
Payer: COMMERCIAL

## 2023-08-10 DIAGNOSIS — I47.1 PAROXYSMAL SUPRAVENTRICULAR TACHYCARDIA (HCC): ICD-10-CM

## 2023-08-10 DIAGNOSIS — R79.89 ELEVATED D-DIMER: ICD-10-CM

## 2023-08-10 DIAGNOSIS — R77.8 ELEVATED TROPONIN: Primary | ICD-10-CM

## 2023-08-10 DIAGNOSIS — R00.0 TACHYCARDIA: ICD-10-CM

## 2023-08-10 PROBLEM — M50.20 CERVICAL HERNIATED DISC: Status: ACTIVE | Noted: 2020-04-10

## 2023-08-10 PROBLEM — M15.0 PRIMARY OSTEOARTHRITIS INVOLVING MULTIPLE JOINTS: Status: ACTIVE | Noted: 2019-12-31

## 2023-08-10 PROBLEM — I10 ESSENTIAL HYPERTENSION: Status: ACTIVE | Noted: 2020-02-03

## 2023-08-10 PROBLEM — M21.372 LEFT FOOT DROP: Status: ACTIVE | Noted: 2020-04-10

## 2023-08-10 PROBLEM — J31.0 CHRONIC RHINITIS: Status: ACTIVE | Noted: 2023-03-17

## 2023-08-10 PROBLEM — E87.6 HYPOKALEMIA: Status: ACTIVE | Noted: 2020-02-03

## 2023-08-10 PROBLEM — G89.4 CHRONIC PAIN DISORDER: Status: ACTIVE | Noted: 2020-03-06

## 2023-08-10 PROBLEM — L89.890: Status: ACTIVE | Noted: 2023-08-10

## 2023-08-10 PROBLEM — A41.9 SEPTIC SHOCK (HCC): Status: ACTIVE | Noted: 2019-12-31

## 2023-08-10 PROBLEM — M15.9 PRIMARY OSTEOARTHRITIS INVOLVING MULTIPLE JOINTS: Status: ACTIVE | Noted: 2019-12-31

## 2023-08-10 PROBLEM — J18.9 PNEUMONIA OF BOTH LOWER LOBES DUE TO INFECTIOUS ORGANISM: Status: ACTIVE | Noted: 2019-12-31

## 2023-08-10 PROBLEM — T14.8XXA FRACTURE OF BONE: Status: ACTIVE | Noted: 2023-08-10

## 2023-08-10 PROBLEM — J44.9 CHRONIC OBSTRUCTIVE PULMONARY DISEASE (HCC): Status: ACTIVE | Noted: 2022-10-14

## 2023-08-10 PROBLEM — J96.01 ACUTE RESPIRATORY FAILURE WITH HYPOXIA (HCC): Status: ACTIVE | Noted: 2019-12-31

## 2023-08-10 PROBLEM — T81.89XA SURGICAL WOUND, NON HEALING: Status: ACTIVE | Noted: 2020-02-06

## 2023-08-10 PROBLEM — R19.7 DIARRHEA: Status: ACTIVE | Noted: 2020-01-29

## 2023-08-10 PROBLEM — R53.81 PHYSICAL DECONDITIONING: Status: ACTIVE | Noted: 2020-03-06

## 2023-08-10 PROBLEM — R65.21 SEPTIC SHOCK (HCC): Status: ACTIVE | Noted: 2019-12-31

## 2023-08-10 PROBLEM — Z96.641 HISTORY OF TOTAL HIP ARTHROPLASTY, RIGHT: Status: ACTIVE | Noted: 2020-06-11

## 2023-08-10 PROBLEM — M54.2 CERVICAL PAIN: Status: ACTIVE | Noted: 2020-03-02

## 2023-08-10 PROBLEM — R53.81 DEBILITY: Status: ACTIVE | Noted: 2023-08-10

## 2023-08-10 PROBLEM — D68.9 COAGULOPATHY (HCC): Status: ACTIVE | Noted: 2019-12-31

## 2023-08-10 PROBLEM — R05.2 SUBACUTE COUGH: Status: ACTIVE | Noted: 2023-03-17

## 2023-08-10 PROBLEM — M41.20 IDIOPATHIC SCOLIOSIS AND KYPHOSCOLIOSIS: Status: ACTIVE | Noted: 2020-03-02

## 2023-08-10 PROBLEM — G93.41 METABOLIC ENCEPHALOPATHY: Status: ACTIVE | Noted: 2020-01-21

## 2023-08-10 PROBLEM — T17.500A MUCUS PLUGGING OF BRONCHI: Status: ACTIVE | Noted: 2020-01-02

## 2023-08-10 PROBLEM — N17.9 AKI (ACUTE KIDNEY INJURY) (HCC): Status: ACTIVE | Noted: 2019-12-31

## 2023-08-10 PROBLEM — M48.061 NEUROFORAMINAL STENOSIS OF LUMBAR SPINE: Status: ACTIVE | Noted: 2020-04-10

## 2023-08-10 PROBLEM — F43.23 ADJUSTMENT REACTION WITH ANXIETY AND DEPRESSION: Status: ACTIVE | Noted: 2020-01-21

## 2023-08-10 PROBLEM — R26.9 GAIT ABNORMALITY: Status: ACTIVE | Noted: 2020-04-10

## 2023-08-10 PROBLEM — Q78.2 OSTEOPETROSIS: Status: ACTIVE | Noted: 2020-03-02

## 2023-08-10 LAB
ANION GAP SERPL CALCULATED.3IONS-SCNC: 16 MMOL/L (ref 9–17)
BASOPHILS # BLD: 0.06 K/UL (ref 0–0.2)
BASOPHILS NFR BLD: 0 % (ref 0–2)
BILIRUB UR QL STRIP: NEGATIVE
BUN SERPL-MCNC: 12 MG/DL (ref 6–20)
BUN/CREAT SERPL: 13 (ref 9–20)
CALCIUM SERPL-MCNC: 9.2 MG/DL (ref 8.6–10.4)
CHLORIDE SERPL-SCNC: 100 MMOL/L (ref 98–107)
CLARITY UR: CLEAR
CO2 SERPL-SCNC: 25 MMOL/L (ref 20–31)
COLOR UR: YELLOW
COMMENT: ABNORMAL
CREAT SERPL-MCNC: 0.9 MG/DL (ref 0.5–0.9)
D DIMER PPP FEU-MCNC: >20 UG/ML FEU (ref 0–0.59)
EKG ATRIAL RATE: 111 BPM
EKG ATRIAL RATE: 99 BPM
EKG P AXIS: 41 DEGREES
EKG P AXIS: 41 DEGREES
EKG P-R INTERVAL: 138 MS
EKG P-R INTERVAL: 140 MS
EKG Q-T INTERVAL: 342 MS
EKG Q-T INTERVAL: 362 MS
EKG QRS DURATION: 112 MS
EKG QRS DURATION: 118 MS
EKG QTC CALCULATION (BAZETT): 464 MS
EKG QTC CALCULATION (BAZETT): 465 MS
EKG R AXIS: 56 DEGREES
EKG R AXIS: 9 DEGREES
EKG T AXIS: -20 DEGREES
EKG T AXIS: -4 DEGREES
EKG VENTRICULAR RATE: 111 BPM
EKG VENTRICULAR RATE: 99 BPM
EOSINOPHIL # BLD: 0.07 K/UL (ref 0–0.44)
EOSINOPHILS RELATIVE PERCENT: 0 % (ref 1–4)
ERYTHROCYTE [DISTWIDTH] IN BLOOD BY AUTOMATED COUNT: 14.8 % (ref 11.8–14.4)
GFR SERPL CREATININE-BSD FRML MDRD: >60 ML/MIN/1.73M2
GLUCOSE SERPL-MCNC: 184 MG/DL (ref 70–99)
GLUCOSE UR STRIP-MCNC: NEGATIVE MG/DL
HCT VFR BLD AUTO: 46.4 % (ref 36.3–47.1)
HGB BLD-MCNC: 14.4 G/DL (ref 11.9–15.1)
HGB UR QL STRIP.AUTO: NEGATIVE
IMM GRANULOCYTES # BLD AUTO: 0.06 K/UL (ref 0–0.3)
IMM GRANULOCYTES NFR BLD: 0 %
INR PPP: 0.9
KETONES UR STRIP-MCNC: NEGATIVE MG/DL
LEUKOCYTE ESTERASE UR QL STRIP: NEGATIVE
LYMPHOCYTES NFR BLD: 1.55 K/UL (ref 1.1–3.7)
LYMPHOCYTES RELATIVE PERCENT: 10 % (ref 24–43)
MCH RBC QN AUTO: 26.1 PG (ref 25.2–33.5)
MCHC RBC AUTO-ENTMCNC: 31 G/DL (ref 25.2–33.5)
MCV RBC AUTO: 84.2 FL (ref 82.6–102.9)
MONOCYTES NFR BLD: 1.01 K/UL (ref 0.1–1.2)
MONOCYTES NFR BLD: 6 % (ref 3–12)
NEUTROPHILS NFR BLD: 84 % (ref 36–65)
NEUTS SEG NFR BLD: 13.15 K/UL (ref 1.5–8.1)
NITRITE UR QL STRIP: NEGATIVE
NRBC BLD-RTO: 0 PER 100 WBC
PARTIAL THROMBOPLASTIN TIME: 25.1 SEC (ref 23.9–33.8)
PH UR STRIP: 6.5 [PH] (ref 5–6)
PLATELET # BLD AUTO: 221 K/UL (ref 138–453)
PMV BLD AUTO: 11.1 FL (ref 8.1–13.5)
POTASSIUM SERPL-SCNC: 3.8 MMOL/L (ref 3.7–5.3)
PROT UR STRIP-MCNC: NEGATIVE MG/DL
PROTHROMBIN TIME: 11.9 SEC (ref 11.5–14.2)
RBC # BLD AUTO: 5.51 M/UL (ref 3.95–5.11)
RBC # BLD: ABNORMAL 10*6/UL
SARS-COV-2 RDRP RESP QL NAA+PROBE: NOT DETECTED
SODIUM SERPL-SCNC: 141 MMOL/L (ref 135–144)
SP GR UR STRIP: 1.02 (ref 1.01–1.02)
SPECIMEN DESCRIPTION: NORMAL
TROPONIN I SERPL HS-MCNC: 71 NG/L (ref 0–14)
TROPONIN I SERPL HS-MCNC: 71 NG/L (ref 0–14)
TSH SERPL DL<=0.05 MIU/L-ACNC: 0.88 UIU/ML (ref 0.3–5)
UROBILINOGEN UR STRIP-ACNC: NORMAL EU/DL (ref 0–1)
WBC OTHER # BLD: 15.9 K/UL (ref 3.5–11.3)

## 2023-08-10 PROCEDURE — 80048 BASIC METABOLIC PNL TOTAL CA: CPT

## 2023-08-10 PROCEDURE — 2580000003 HC RX 258

## 2023-08-10 PROCEDURE — 71260 CT THORAX DX C+: CPT

## 2023-08-10 PROCEDURE — 6360000002 HC RX W HCPCS: Performed by: EMERGENCY MEDICINE

## 2023-08-10 PROCEDURE — 99285 EMERGENCY DEPT VISIT HI MDM: CPT

## 2023-08-10 PROCEDURE — 5A2204Z RESTORATION OF CARDIAC RHYTHM, SINGLE: ICD-10-PCS | Performed by: INTERNAL MEDICINE

## 2023-08-10 PROCEDURE — 87635 SARS-COV-2 COVID-19 AMP PRB: CPT

## 2023-08-10 PROCEDURE — 6360000002 HC RX W HCPCS

## 2023-08-10 PROCEDURE — 84443 ASSAY THYROID STIM HORMONE: CPT

## 2023-08-10 PROCEDURE — 96374 THER/PROPH/DIAG INJ IV PUSH: CPT

## 2023-08-10 PROCEDURE — 81003 URINALYSIS AUTO W/O SCOPE: CPT

## 2023-08-10 PROCEDURE — 85730 THROMBOPLASTIN TIME PARTIAL: CPT

## 2023-08-10 PROCEDURE — 83036 HEMOGLOBIN GLYCOSYLATED A1C: CPT

## 2023-08-10 PROCEDURE — 6370000000 HC RX 637 (ALT 250 FOR IP): Performed by: EMERGENCY MEDICINE

## 2023-08-10 PROCEDURE — 96361 HYDRATE IV INFUSION ADD-ON: CPT

## 2023-08-10 PROCEDURE — 99222 1ST HOSP IP/OBS MODERATE 55: CPT

## 2023-08-10 PROCEDURE — 2060000000 HC ICU INTERMEDIATE R&B

## 2023-08-10 PROCEDURE — 2580000003 HC RX 258: Performed by: EMERGENCY MEDICINE

## 2023-08-10 PROCEDURE — 85025 COMPLETE CBC W/AUTO DIFF WBC: CPT

## 2023-08-10 PROCEDURE — 84484 ASSAY OF TROPONIN QUANT: CPT

## 2023-08-10 PROCEDURE — 6360000004 HC RX CONTRAST MEDICATION: Performed by: EMERGENCY MEDICINE

## 2023-08-10 PROCEDURE — 85610 PROTHROMBIN TIME: CPT

## 2023-08-10 PROCEDURE — 36415 COLL VENOUS BLD VENIPUNCTURE: CPT

## 2023-08-10 PROCEDURE — 85379 FIBRIN DEGRADATION QUANT: CPT

## 2023-08-10 RX ORDER — PANTOPRAZOLE SODIUM 40 MG/1
40 TABLET, DELAYED RELEASE ORAL ONCE
Status: COMPLETED | OUTPATIENT
Start: 2023-08-10 | End: 2023-08-10

## 2023-08-10 RX ORDER — TIZANIDINE 4 MG/1
4 TABLET ORAL EVERY 8 HOURS PRN
Status: DISCONTINUED | OUTPATIENT
Start: 2023-08-10 | End: 2023-08-11 | Stop reason: HOSPADM

## 2023-08-10 RX ORDER — DOXYCYCLINE HYCLATE 100 MG
TABLET ORAL
COMMUNITY
Start: 2023-07-25

## 2023-08-10 RX ORDER — HYDROCODONE BITARTRATE AND ACETAMINOPHEN 5; 325 MG/1; MG/1
1 TABLET ORAL EVERY 4 HOURS PRN
Status: DISCONTINUED | OUTPATIENT
Start: 2023-08-10 | End: 2023-08-11 | Stop reason: HOSPADM

## 2023-08-10 RX ORDER — HYDROCODONE BITARTRATE AND ACETAMINOPHEN 5; 325 MG/1; MG/1
2 TABLET ORAL EVERY 4 HOURS PRN
Status: DISCONTINUED | OUTPATIENT
Start: 2023-08-10 | End: 2023-08-11 | Stop reason: HOSPADM

## 2023-08-10 RX ORDER — ACETAMINOPHEN 325 MG/1
650 TABLET ORAL EVERY 6 HOURS PRN
Status: DISCONTINUED | OUTPATIENT
Start: 2023-08-10 | End: 2023-08-11 | Stop reason: HOSPADM

## 2023-08-10 RX ORDER — IPRATROPIUM BROMIDE AND ALBUTEROL SULFATE 2.5; .5 MG/3ML; MG/3ML
1 SOLUTION RESPIRATORY (INHALATION) EVERY 4 HOURS PRN
Status: DISCONTINUED | OUTPATIENT
Start: 2023-08-10 | End: 2023-08-11 | Stop reason: HOSPADM

## 2023-08-10 RX ORDER — ONDANSETRON 4 MG/1
4 TABLET, ORALLY DISINTEGRATING ORAL EVERY 8 HOURS PRN
Status: DISCONTINUED | OUTPATIENT
Start: 2023-08-10 | End: 2023-08-11 | Stop reason: HOSPADM

## 2023-08-10 RX ORDER — ENOXAPARIN SODIUM 100 MG/ML
40 INJECTION SUBCUTANEOUS DAILY
Status: DISCONTINUED | OUTPATIENT
Start: 2023-08-11 | End: 2023-08-11

## 2023-08-10 RX ORDER — PREDNISONE 5 MG/1
7.5 TABLET ORAL DAILY
Status: DISCONTINUED | OUTPATIENT
Start: 2023-08-11 | End: 2023-08-11 | Stop reason: HOSPADM

## 2023-08-10 RX ORDER — CALCIUM CARBONATE 500 MG/1
500 TABLET, CHEWABLE ORAL DAILY
Status: DISCONTINUED | OUTPATIENT
Start: 2023-08-11 | End: 2023-08-11 | Stop reason: HOSPADM

## 2023-08-10 RX ORDER — NICOTINE 21 MG/24HR
1 PATCH, TRANSDERMAL 24 HOURS TRANSDERMAL DAILY
Status: DISCONTINUED | OUTPATIENT
Start: 2023-08-11 | End: 2023-08-11 | Stop reason: HOSPADM

## 2023-08-10 RX ORDER — ONDANSETRON 2 MG/ML
4 INJECTION INTRAMUSCULAR; INTRAVENOUS EVERY 6 HOURS PRN
Status: DISCONTINUED | OUTPATIENT
Start: 2023-08-10 | End: 2023-08-11 | Stop reason: HOSPADM

## 2023-08-10 RX ORDER — POLYETHYLENE GLYCOL 3350 17 G/17G
17 POWDER, FOR SOLUTION ORAL DAILY PRN
Status: DISCONTINUED | OUTPATIENT
Start: 2023-08-10 | End: 2023-08-11 | Stop reason: HOSPADM

## 2023-08-10 RX ORDER — HYDROXYCHLOROQUINE SULFATE 200 MG/1
200 TABLET, FILM COATED ORAL 2 TIMES DAILY
Status: DISCONTINUED | OUTPATIENT
Start: 2023-08-11 | End: 2023-08-11 | Stop reason: HOSPADM

## 2023-08-10 RX ORDER — MONTELUKAST SODIUM 10 MG/1
10 TABLET ORAL DAILY
Status: DISCONTINUED | OUTPATIENT
Start: 2023-08-11 | End: 2023-08-11 | Stop reason: HOSPADM

## 2023-08-10 RX ORDER — SODIUM CHLORIDE 0.9 % (FLUSH) 0.9 %
5-40 SYRINGE (ML) INJECTION EVERY 12 HOURS SCHEDULED
Status: DISCONTINUED | OUTPATIENT
Start: 2023-08-11 | End: 2023-08-11 | Stop reason: HOSPADM

## 2023-08-10 RX ORDER — INSULIN LISPRO 100 [IU]/ML
0-4 INJECTION, SOLUTION INTRAVENOUS; SUBCUTANEOUS NIGHTLY
Status: DISCONTINUED | OUTPATIENT
Start: 2023-08-11 | End: 2023-08-11 | Stop reason: HOSPADM

## 2023-08-10 RX ORDER — VITAMIN B COMPLEX
2000 TABLET ORAL 2 TIMES DAILY
Status: DISCONTINUED | OUTPATIENT
Start: 2023-08-11 | End: 2023-08-11 | Stop reason: HOSPADM

## 2023-08-10 RX ORDER — SODIUM CHLORIDE 9 MG/ML
INJECTION, SOLUTION INTRAVENOUS PRN
Status: DISCONTINUED | OUTPATIENT
Start: 2023-08-10 | End: 2023-08-11 | Stop reason: HOSPADM

## 2023-08-10 RX ORDER — SODIUM CHLORIDE 9 MG/ML
INJECTION, SOLUTION INTRAVENOUS CONTINUOUS
Status: DISCONTINUED | OUTPATIENT
Start: 2023-08-11 | End: 2023-08-11 | Stop reason: HOSPADM

## 2023-08-10 RX ORDER — PANTOPRAZOLE SODIUM 40 MG/1
40 TABLET, DELAYED RELEASE ORAL
Status: DISCONTINUED | OUTPATIENT
Start: 2023-08-11 | End: 2023-08-11 | Stop reason: HOSPADM

## 2023-08-10 RX ORDER — 0.9 % SODIUM CHLORIDE 0.9 %
1000 INTRAVENOUS SOLUTION INTRAVENOUS ONCE
Status: COMPLETED | OUTPATIENT
Start: 2023-08-10 | End: 2023-08-10

## 2023-08-10 RX ORDER — ACETAMINOPHEN 650 MG/1
650 SUPPOSITORY RECTAL EVERY 6 HOURS PRN
Status: DISCONTINUED | OUTPATIENT
Start: 2023-08-10 | End: 2023-08-11 | Stop reason: HOSPADM

## 2023-08-10 RX ORDER — HYDROCODONE BITARTRATE AND ACETAMINOPHEN 5; 325 MG/1; MG/1
2 TABLET ORAL ONCE
Status: COMPLETED | OUTPATIENT
Start: 2023-08-10 | End: 2023-08-10

## 2023-08-10 RX ORDER — UMECLIDINIUM 62.5 UG/1
1 AEROSOL, POWDER ORAL DAILY
COMMUNITY
Start: 2023-07-25

## 2023-08-10 RX ORDER — CETIRIZINE HYDROCHLORIDE 10 MG/1
10 TABLET, CHEWABLE ORAL DAILY
COMMUNITY

## 2023-08-10 RX ORDER — TRAZODONE HYDROCHLORIDE 50 MG/1
300 TABLET ORAL NIGHTLY
Status: DISCONTINUED | OUTPATIENT
Start: 2023-08-11 | End: 2023-08-11 | Stop reason: HOSPADM

## 2023-08-10 RX ORDER — LANOLIN ALCOHOL/MO/W.PET/CERES
10 CREAM (GRAM) TOPICAL NIGHTLY
Status: DISCONTINUED | OUTPATIENT
Start: 2023-08-11 | End: 2023-08-11 | Stop reason: HOSPADM

## 2023-08-10 RX ORDER — ONDANSETRON 4 MG/1
TABLET, FILM COATED ORAL
COMMUNITY
Start: 2023-07-17

## 2023-08-10 RX ORDER — LIDOCAINE 4 G/G
1 PATCH TOPICAL DAILY PRN
Status: DISCONTINUED | OUTPATIENT
Start: 2023-08-10 | End: 2023-08-11 | Stop reason: HOSPADM

## 2023-08-10 RX ORDER — ADENOSINE 3 MG/ML
6 INJECTION, SOLUTION INTRAVENOUS ONCE
Status: COMPLETED | OUTPATIENT
Start: 2023-08-10 | End: 2023-08-10

## 2023-08-10 RX ORDER — INSULIN LISPRO 100 [IU]/ML
0-4 INJECTION, SOLUTION INTRAVENOUS; SUBCUTANEOUS
Status: DISCONTINUED | OUTPATIENT
Start: 2023-08-11 | End: 2023-08-11 | Stop reason: HOSPADM

## 2023-08-10 RX ORDER — BENZONATATE 100 MG/1
100 CAPSULE ORAL 3 TIMES DAILY PRN
Status: DISCONTINUED | OUTPATIENT
Start: 2023-08-10 | End: 2023-08-11 | Stop reason: HOSPADM

## 2023-08-10 RX ORDER — CETIRIZINE HYDROCHLORIDE 5 MG/1
10 TABLET ORAL DAILY
Status: DISCONTINUED | OUTPATIENT
Start: 2023-08-11 | End: 2023-08-11 | Stop reason: HOSPADM

## 2023-08-10 RX ORDER — SODIUM CHLORIDE 0.9 % (FLUSH) 0.9 %
5-40 SYRINGE (ML) INJECTION PRN
Status: DISCONTINUED | OUTPATIENT
Start: 2023-08-10 | End: 2023-08-11 | Stop reason: HOSPADM

## 2023-08-10 RX ORDER — POLYETHYLENE GLYCOL 3350
17 POWDER (GRAM) MISCELLANEOUS EVERY 24 HOURS
COMMUNITY
Start: 2020-04-10

## 2023-08-10 RX ORDER — TRAZODONE HYDROCHLORIDE 150 MG/1
TABLET ORAL
COMMUNITY
Start: 2023-06-03

## 2023-08-10 RX ORDER — DEXTROSE MONOHYDRATE 100 MG/ML
INJECTION, SOLUTION INTRAVENOUS CONTINUOUS PRN
Status: DISCONTINUED | OUTPATIENT
Start: 2023-08-10 | End: 2023-08-11 | Stop reason: HOSPADM

## 2023-08-10 RX ADMIN — SODIUM CHLORIDE 1000 ML: 9 INJECTION, SOLUTION INTRAVENOUS at 16:59

## 2023-08-10 RX ADMIN — PANTOPRAZOLE SODIUM 40 MG: 40 TABLET, DELAYED RELEASE ORAL at 21:55

## 2023-08-10 RX ADMIN — ADENOSINE 6 MG: 3 INJECTION INTRAVENOUS at 17:02

## 2023-08-10 RX ADMIN — SODIUM CHLORIDE: 9 INJECTION, SOLUTION INTRAVENOUS at 23:58

## 2023-08-10 RX ADMIN — HYDROCODONE BITARTRATE AND ACETAMINOPHEN 2 TABLET: 5; 325 TABLET ORAL at 21:55

## 2023-08-10 RX ADMIN — IOPAMIDOL 80 ML: 755 INJECTION, SOLUTION INTRAVENOUS at 18:32

## 2023-08-10 RX ADMIN — METOPROLOL TARTRATE 25 MG: 25 TABLET, FILM COATED ORAL at 21:56

## 2023-08-10 ASSESSMENT — LIFESTYLE VARIABLES
HOW OFTEN DO YOU HAVE A DRINK CONTAINING ALCOHOL: NEVER
HOW MANY STANDARD DRINKS CONTAINING ALCOHOL DO YOU HAVE ON A TYPICAL DAY: PATIENT DOES NOT DRINK

## 2023-08-10 ASSESSMENT — PAIN - FUNCTIONAL ASSESSMENT: PAIN_FUNCTIONAL_ASSESSMENT: NONE - DENIES PAIN

## 2023-08-11 ENCOUNTER — HOSPITAL ENCOUNTER (OUTPATIENT)
Age: 58
Discharge: HOSPICE | End: 2023-08-11
Attending: INTERNAL MEDICINE | Admitting: INTERNAL MEDICINE
Payer: COMMERCIAL

## 2023-08-11 VITALS
WEIGHT: 183 LBS | SYSTOLIC BLOOD PRESSURE: 110 MMHG | BODY MASS INDEX: 30.49 KG/M2 | RESPIRATION RATE: 22 BRPM | HEART RATE: 64 BPM | TEMPERATURE: 97.9 F | HEIGHT: 65 IN | DIASTOLIC BLOOD PRESSURE: 62 MMHG | OXYGEN SATURATION: 95 %

## 2023-08-11 VITALS
OXYGEN SATURATION: 96 % | HEIGHT: 65 IN | WEIGHT: 183 LBS | TEMPERATURE: 97.8 F | BODY MASS INDEX: 30.49 KG/M2 | RESPIRATION RATE: 17 BRPM | DIASTOLIC BLOOD PRESSURE: 48 MMHG | HEART RATE: 63 BPM | SYSTOLIC BLOOD PRESSURE: 95 MMHG

## 2023-08-11 DIAGNOSIS — I21.4 NSTEMI (NON-ST ELEVATED MYOCARDIAL INFARCTION) (HCC): ICD-10-CM

## 2023-08-11 PROBLEM — I47.10 SVT (SUPRAVENTRICULAR TACHYCARDIA): Status: ACTIVE | Noted: 2023-08-11

## 2023-08-11 PROBLEM — I47.1 SVT (SUPRAVENTRICULAR TACHYCARDIA) (HCC): Status: ACTIVE | Noted: 2023-08-11

## 2023-08-11 PROBLEM — I15.9 SECONDARY HYPERTENSION: Status: ACTIVE | Noted: 2023-08-11

## 2023-08-11 LAB
ANION GAP SERPL CALCULATED.3IONS-SCNC: 8 MMOL/L (ref 9–17)
BASOPHILS # BLD: 0.04 K/UL (ref 0–0.2)
BASOPHILS NFR BLD: 1 % (ref 0–2)
BUN SERPL-MCNC: 9 MG/DL (ref 6–20)
BUN/CREAT SERPL: 15 (ref 9–20)
CALCIUM SERPL-MCNC: 8.3 MG/DL (ref 8.6–10.4)
CHLORIDE SERPL-SCNC: 105 MMOL/L (ref 98–107)
CO2 SERPL-SCNC: 28 MMOL/L (ref 20–31)
CORTIS SERPL-MCNC: <0.3 UG/DL (ref 2.7–18.4)
CREAT SERPL-MCNC: 0.6 MG/DL (ref 0.5–0.9)
ECHO BSA: 1.95 M2
EKG ATRIAL RATE: 69 BPM
EKG P AXIS: 54 DEGREES
EKG P-R INTERVAL: 142 MS
EKG Q-T INTERVAL: 208 MS
EKG Q-T INTERVAL: 480 MS
EKG QRS DURATION: 110 MS
EKG QRS DURATION: 118 MS
EKG QTC CALCULATION (BAZETT): 388 MS
EKG QTC CALCULATION (BAZETT): 514 MS
EKG R AXIS: 58 DEGREES
EKG R AXIS: 94 DEGREES
EKG T AXIS: -34 DEGREES
EKG T AXIS: 20 DEGREES
EKG VENTRICULAR RATE: 210 BPM
EKG VENTRICULAR RATE: 69 BPM
EOSINOPHIL # BLD: 0.14 K/UL (ref 0–0.44)
EOSINOPHILS RELATIVE PERCENT: 2 % (ref 1–4)
ERYTHROCYTE [DISTWIDTH] IN BLOOD BY AUTOMATED COUNT: 14.8 % (ref 11.8–14.4)
EST. AVERAGE GLUCOSE BLD GHB EST-MCNC: 137 MG/DL
GFR SERPL CREATININE-BSD FRML MDRD: >60 ML/MIN/1.73M2
GLUCOSE BLD-MCNC: 102 MG/DL (ref 65–105)
GLUCOSE BLD-MCNC: 114 MG/DL (ref 65–105)
GLUCOSE SERPL-MCNC: 96 MG/DL (ref 70–99)
HBA1C MFR BLD: 6.4 % (ref 4–6)
HCT VFR BLD AUTO: 38.4 % (ref 36.3–47.1)
HGB BLD-MCNC: 12 G/DL (ref 11.9–15.1)
IMM GRANULOCYTES # BLD AUTO: 0.03 K/UL (ref 0–0.3)
IMM GRANULOCYTES NFR BLD: 0 %
LYMPHOCYTES NFR BLD: 2.01 K/UL (ref 1.1–3.7)
LYMPHOCYTES RELATIVE PERCENT: 26 % (ref 24–43)
MCH RBC QN AUTO: 26.6 PG (ref 25.2–33.5)
MCHC RBC AUTO-ENTMCNC: 31.3 G/DL (ref 25.2–33.5)
MCV RBC AUTO: 85.1 FL (ref 82.6–102.9)
MONOCYTES NFR BLD: 0.58 K/UL (ref 0.1–1.2)
MONOCYTES NFR BLD: 8 % (ref 3–12)
NEUTROPHILS NFR BLD: 64 % (ref 36–65)
NEUTS SEG NFR BLD: 4.95 K/UL (ref 1.5–8.1)
NRBC BLD-RTO: 0 PER 100 WBC
PLATELET # BLD AUTO: 175 K/UL (ref 138–453)
PMV BLD AUTO: 11.4 FL (ref 8.1–13.5)
POTASSIUM SERPL-SCNC: 3.7 MMOL/L (ref 3.7–5.3)
RBC # BLD AUTO: 4.51 M/UL (ref 3.95–5.11)
RBC # BLD: ABNORMAL 10*6/UL
SODIUM SERPL-SCNC: 141 MMOL/L (ref 135–144)
TROPONIN I SERPL HS-MCNC: 79 NG/L (ref 0–14)
TROPONIN I SERPL HS-MCNC: 85 NG/L (ref 0–14)
WBC OTHER # BLD: 7.8 K/UL (ref 3.5–11.3)

## 2023-08-11 PROCEDURE — 84484 ASSAY OF TROPONIN QUANT: CPT

## 2023-08-11 PROCEDURE — 99222 1ST HOSP IP/OBS MODERATE 55: CPT | Performed by: INTERNAL MEDICINE

## 2023-08-11 PROCEDURE — 94760 N-INVAS EAR/PLS OXIMETRY 1: CPT

## 2023-08-11 PROCEDURE — 85025 COMPLETE CBC W/AUTO DIFF WBC: CPT

## 2023-08-11 PROCEDURE — 6360000004 HC RX CONTRAST MEDICATION: Performed by: INTERNAL MEDICINE

## 2023-08-11 PROCEDURE — 93458 L HRT ARTERY/VENTRICLE ANGIO: CPT | Performed by: INTERNAL MEDICINE

## 2023-08-11 PROCEDURE — 6360000002 HC RX W HCPCS

## 2023-08-11 PROCEDURE — 6370000000 HC RX 637 (ALT 250 FOR IP)

## 2023-08-11 PROCEDURE — 36415 COLL VENOUS BLD VENIPUNCTURE: CPT

## 2023-08-11 PROCEDURE — 82947 ASSAY GLUCOSE BLOOD QUANT: CPT

## 2023-08-11 PROCEDURE — 6360000002 HC RX W HCPCS: Performed by: INTERNAL MEDICINE

## 2023-08-11 PROCEDURE — 2709999900 HC NON-CHARGEABLE SUPPLY: Performed by: INTERNAL MEDICINE

## 2023-08-11 PROCEDURE — 93005 ELECTROCARDIOGRAM TRACING: CPT

## 2023-08-11 PROCEDURE — 2580000003 HC RX 258: Performed by: INTERNAL MEDICINE

## 2023-08-11 PROCEDURE — 99254 IP/OBS CNSLTJ NEW/EST MOD 60: CPT | Performed by: INTERNAL MEDICINE

## 2023-08-11 PROCEDURE — 99153 MOD SED SAME PHYS/QHP EA: CPT | Performed by: INTERNAL MEDICINE

## 2023-08-11 PROCEDURE — 2580000003 HC RX 258

## 2023-08-11 PROCEDURE — 99221 1ST HOSP IP/OBS SF/LOW 40: CPT | Performed by: INTERNAL MEDICINE

## 2023-08-11 PROCEDURE — 80048 BASIC METABOLIC PNL TOTAL CA: CPT

## 2023-08-11 PROCEDURE — 82533 TOTAL CORTISOL: CPT

## 2023-08-11 PROCEDURE — 6370000000 HC RX 637 (ALT 250 FOR IP): Performed by: STUDENT IN AN ORGANIZED HEALTH CARE EDUCATION/TRAINING PROGRAM

## 2023-08-11 PROCEDURE — 99152 MOD SED SAME PHYS/QHP 5/>YRS: CPT | Performed by: INTERNAL MEDICINE

## 2023-08-11 PROCEDURE — 2500000003 HC RX 250 WO HCPCS: Performed by: INTERNAL MEDICINE

## 2023-08-11 RX ORDER — ENOXAPARIN SODIUM 100 MG/ML
1 INJECTION SUBCUTANEOUS 2 TIMES DAILY
Status: DISCONTINUED | OUTPATIENT
Start: 2023-08-11 | End: 2023-08-11 | Stop reason: HOSPADM

## 2023-08-11 RX ORDER — HYDROCODONE BITARTRATE AND ACETAMINOPHEN 5; 325 MG/1; MG/1
1 TABLET ORAL EVERY 6 HOURS PRN
Status: DISCONTINUED | OUTPATIENT
Start: 2023-08-11 | End: 2023-08-12 | Stop reason: HOSPADM

## 2023-08-11 RX ORDER — FENTANYL CITRATE 50 UG/ML
INJECTION, SOLUTION INTRAMUSCULAR; INTRAVENOUS PRN
Status: DISCONTINUED | OUTPATIENT
Start: 2023-08-11 | End: 2023-08-11 | Stop reason: HOSPADM

## 2023-08-11 RX ORDER — SODIUM CHLORIDE 9 MG/ML
INJECTION, SOLUTION INTRAVENOUS CONTINUOUS
Status: DISCONTINUED | OUTPATIENT
Start: 2023-08-11 | End: 2023-08-12 | Stop reason: HOSPADM

## 2023-08-11 RX ORDER — METOPROLOL TARTRATE 37.5 MG/1
25 TABLET, FILM COATED ORAL 2 TIMES DAILY
Qty: 60 TABLET | Refills: 3 | Status: SHIPPED | OUTPATIENT
Start: 2023-08-11

## 2023-08-11 RX ORDER — SODIUM CHLORIDE 0.9 % (FLUSH) 0.9 %
5-40 SYRINGE (ML) INJECTION PRN
Status: DISCONTINUED | OUTPATIENT
Start: 2023-08-11 | End: 2023-08-12 | Stop reason: HOSPADM

## 2023-08-11 RX ORDER — SODIUM CHLORIDE 9 MG/ML
INJECTION, SOLUTION INTRAVENOUS PRN
Status: CANCELLED | OUTPATIENT
Start: 2023-08-11

## 2023-08-11 RX ORDER — SODIUM CHLORIDE 0.9 % (FLUSH) 0.9 %
5-40 SYRINGE (ML) INJECTION PRN
Status: CANCELLED | OUTPATIENT
Start: 2023-08-11

## 2023-08-11 RX ORDER — SODIUM CHLORIDE 9 MG/ML
INJECTION, SOLUTION INTRAVENOUS PRN
Status: DISCONTINUED | OUTPATIENT
Start: 2023-08-11 | End: 2023-08-11

## 2023-08-11 RX ORDER — SODIUM CHLORIDE 0.9 % (FLUSH) 0.9 %
5-40 SYRINGE (ML) INJECTION EVERY 12 HOURS SCHEDULED
Status: DISCONTINUED | OUTPATIENT
Start: 2023-08-11 | End: 2023-08-12 | Stop reason: HOSPADM

## 2023-08-11 RX ORDER — MIDAZOLAM HYDROCHLORIDE 1 MG/ML
INJECTION INTRAMUSCULAR; INTRAVENOUS PRN
Status: DISCONTINUED | OUTPATIENT
Start: 2023-08-11 | End: 2023-08-11 | Stop reason: HOSPADM

## 2023-08-11 RX ORDER — OXYBUTYNIN CHLORIDE 5 MG/1
5 TABLET, EXTENDED RELEASE ORAL 2 TIMES DAILY
COMMUNITY

## 2023-08-11 RX ORDER — ACETAMINOPHEN 325 MG/1
650 TABLET ORAL EVERY 4 HOURS PRN
Status: DISCONTINUED | OUTPATIENT
Start: 2023-08-11 | End: 2023-08-12 | Stop reason: HOSPADM

## 2023-08-11 RX ORDER — SODIUM CHLORIDE 9 MG/ML
INJECTION, SOLUTION INTRAVENOUS PRN
Status: DISCONTINUED | OUTPATIENT
Start: 2023-08-11 | End: 2023-08-12 | Stop reason: HOSPADM

## 2023-08-11 RX ORDER — SODIUM CHLORIDE 0.9 % (FLUSH) 0.9 %
5-40 SYRINGE (ML) INJECTION EVERY 12 HOURS SCHEDULED
Status: CANCELLED | OUTPATIENT
Start: 2023-08-11

## 2023-08-11 RX ORDER — OXYBUTYNIN CHLORIDE 5 MG/1
5 TABLET ORAL 2 TIMES DAILY
Status: DISCONTINUED | OUTPATIENT
Start: 2023-08-11 | End: 2023-08-11 | Stop reason: HOSPADM

## 2023-08-11 RX ORDER — KETOROLAC TROMETHAMINE 30 MG/ML
30 INJECTION, SOLUTION INTRAMUSCULAR; INTRAVENOUS ONCE
Status: DISCONTINUED | OUTPATIENT
Start: 2023-08-11 | End: 2023-08-12 | Stop reason: HOSPADM

## 2023-08-11 RX ORDER — SUCRALFATE 1 G/1
1 TABLET ORAL 4 TIMES DAILY
COMMUNITY

## 2023-08-11 RX ADMIN — OXYBUTYNIN CHLORIDE 5 MG: 5 TABLET ORAL at 08:23

## 2023-08-11 RX ADMIN — HYDROCODONE BITARTRATE AND ACETAMINOPHEN 2 TABLET: 5; 325 TABLET ORAL at 00:41

## 2023-08-11 RX ADMIN — ENOXAPARIN SODIUM 80 MG: 100 INJECTION SUBCUTANEOUS at 08:23

## 2023-08-11 RX ADMIN — TRAZODONE HYDROCHLORIDE 300 MG: 50 TABLET ORAL at 00:41

## 2023-08-11 RX ADMIN — MONTELUKAST 10 MG: 10 TABLET, FILM COATED ORAL at 08:22

## 2023-08-11 RX ADMIN — HYDROXYCHLOROQUINE SULFATE 200 MG: 200 TABLET ORAL at 08:22

## 2023-08-11 RX ADMIN — ANTACID TABLETS 500 MG: 500 TABLET, CHEWABLE ORAL at 08:22

## 2023-08-11 RX ADMIN — PANTOPRAZOLE SODIUM 40 MG: 40 TABLET, DELAYED RELEASE ORAL at 05:38

## 2023-08-11 RX ADMIN — CHOLECALCIFEROL TAB 25 MCG (1000 UNIT) 2000 UNITS: 25 TAB at 08:22

## 2023-08-11 RX ADMIN — SODIUM CHLORIDE: 9 INJECTION, SOLUTION INTRAVENOUS at 14:05

## 2023-08-11 RX ADMIN — PREDNISONE 7.5 MG: 5 TABLET ORAL at 08:23

## 2023-08-11 RX ADMIN — OXYBUTYNIN CHLORIDE 5 MG: 5 TABLET ORAL at 00:41

## 2023-08-11 RX ADMIN — SODIUM CHLORIDE, PRESERVATIVE FREE 10 ML: 5 INJECTION INTRAVENOUS at 08:26

## 2023-08-11 RX ADMIN — METOPROLOL TARTRATE 25 MG: 25 TABLET, FILM COATED ORAL at 08:22

## 2023-08-11 RX ADMIN — MELATONIN 10.5 MG: 3 TAB ORAL at 00:41

## 2023-08-11 RX ADMIN — ONDANSETRON 4 MG: 2 INJECTION INTRAMUSCULAR; INTRAVENOUS at 03:13

## 2023-08-11 RX ADMIN — HYDROCODONE BITARTRATE AND ACETAMINOPHEN 1 TABLET: 5; 325 TABLET ORAL at 18:14

## 2023-08-11 RX ADMIN — HYDROCODONE BITARTRATE AND ACETAMINOPHEN 2 TABLET: 5; 325 TABLET ORAL at 05:38

## 2023-08-11 ASSESSMENT — PAIN - FUNCTIONAL ASSESSMENT
PAIN_FUNCTIONAL_ASSESSMENT: PREVENTS OR INTERFERES SOME ACTIVE ACTIVITIES AND ADLS
PAIN_FUNCTIONAL_ASSESSMENT: PREVENTS OR INTERFERES SOME ACTIVE ACTIVITIES AND ADLS

## 2023-08-11 ASSESSMENT — PAIN DESCRIPTION - ORIENTATION
ORIENTATION: LOWER;MID
ORIENTATION: LOWER;MID

## 2023-08-11 ASSESSMENT — PAIN SCALES - WONG BAKER
WONGBAKER_NUMERICALRESPONSE: 2
WONGBAKER_NUMERICALRESPONSE: 2

## 2023-08-11 ASSESSMENT — PAIN SCALES - GENERAL
PAINLEVEL_OUTOF10: 4
PAINLEVEL_OUTOF10: 8
PAINLEVEL_OUTOF10: 7
PAINLEVEL_OUTOF10: 3
PAINLEVEL_OUTOF10: 9

## 2023-08-11 ASSESSMENT — PAIN DESCRIPTION - LOCATION
LOCATION: BACK
LOCATION: BACK

## 2023-08-11 ASSESSMENT — PAIN DESCRIPTION - DESCRIPTORS
DESCRIPTORS: SHARP;STABBING
DESCRIPTORS: STABBING;SHARP

## 2023-08-11 NOTE — PLAN OF CARE
Problem: Chronic Conditions and Co-morbidities  Goal: Patient's chronic conditions and co-morbidity symptoms are monitored and maintained or improved  8/11/2023 1754 by Abena Hayes RN  Outcome: Adequate for Discharge  8/11/2023 1745 by Abena Hayes RN  Outcome: Progressing     Problem: Safety - Adult  Goal: Free from fall injury  8/11/2023 1754 by Abena Hayes RN  Outcome: Adequate for Discharge  8/11/2023 1745 by Abena Haeys RN  Outcome: Progressing     Problem: Discharge Planning  Goal: Discharge to home or other facility with appropriate resources  8/11/2023 1754 by Abena Hayes RN  Outcome: Adequate for Discharge  8/11/2023 1745 by Abena Hayes RN  Outcome: Progressing     Problem: Pain  Goal: Verbalizes/displays adequate comfort level or baseline comfort level  Outcome: Adequate for Discharge

## 2023-08-11 NOTE — ED PROVIDER NOTES
ADDENDUM:      Care of this patient was assumed from Dr Henderson Schilder. The patient was seen for Shortness of Breath and Tachycardia  . The patient's initial evaluation and plan have been discussed with the prior provider who initially evaluated the patient. Nursing Notes, Past Medical Hx, Past Surgical Hx, Social Hx, Family Hx, Medications and Allergies, and  were all reviewed. Diagnostic Results     EKG   Initial rhythm strip twelve-lead as well as initial 2 twelve-lead EKGs in the emergency department were reviewed, please see Dr. Hendricks File interpretation. Repeat twelve-lead EKG time 9: 20 2 PM normal sinus rhythm at 99 bpm.  Normal RAUL QRS duration is 112 ms with an incomplete right bundle branch block pattern QTc 464 ms. Compared to the patient's previous EKG dated December 30, 2019 it appears that there are no T wave inversions in the precordial leads V1 through V4 as well as in the inferior leads. RADIOLOGY:  CT CHEST PULMONARY EMBOLISM W CONTRAST    Result Date: 8/10/2023  EXAMINATION: CTA OF THE CHEST 8/10/2023 5:58 pm TECHNIQUE: CTA of the chest was performed after the administration of 80 mL Isovue 370 intravenous contrast.  Multiplanar reformatted images are provided for review. MIP images are provided for review. Automated exposure control, iterative reconstruction, and/or weight based adjustment of the mA/kV was utilized to reduce the radiation dose to as low as reasonably achievable. COMPARISON: No recent imaging available. HISTORY: Acute shortness of breath with tachycardia and elevated D-dimer. FINDINGS: Image quality mildly degraded by motion artifact. Pulmonary Arteries: Pulmonary arteries are adequately opacified for evaluation. No intraluminal filling defect to suggest pulmonary embolism. Main pulmonary artery is normal in caliber. Mediastinum: Heart is not enlarged. No pericardial effusion. Thoracic aorta is normal caliber. No lymphadenopathy. Calcified left suprahilar lymph nodes. eyes are somewhat prominent so TSH was ordered to evaluate thyroid abnormalities including hyperthyroidism, thyroid storm and Graves' disease. Additional concern of adrenal insufficiency given chronic steroid use and history of acute illness. Disposition     FINAL IMPRESSION      1. Elevated troponin    2. Paroxysmal supraventricular tachycardia ([address])    3. Tachycardia    4. Elevated d-dimer          DISPOSITION/PLAN   DISPOSITION Admitted 08/10/2023 10:58:03 PM      PATIENT REFERRED TO:  No follow-up provider specified.     DISCHARGE MEDICATIONS:  Current Discharge Medication List                (Please note that portions of this note were completed with a voice recognition program.  Efforts were made to edit the dictations but occasionally words are mis-transcribed.)    Kevin Gomez MD, [facility]  Attending Emergency Medicine Physician                 Kevin Gomez MD  08/11/23 [id number]

## 2023-08-11 NOTE — CARE COORDINATION
DISCHARGE BARRIERS       Reason for Referral:  SW completed a Psychosocial Assessment for evaluation of patient's mental health, social status, and functional capacity within the community. Elita Severin is a 59 y.o. female admitted due to Tachycardia. Patient accompanied by spouse. SW provided supportive listening while patient discussed past medical history and events leading up to hospitalization. Mental Status:  Alert, oriented, and engaging during assessment. Decision Making:  Makes own decisions. Family/Social/Home Environment: lives with their spouse    Support: Discussed a good social support network     Current Services:  None    Current DMEs: wheelchair     PCP: Kay Mcelroy MD and repots no issues affording medication.  status:   None     ADLs and means of transportation: Assisted in ADLs prior to hospitalization and unable to transport self. Food insecurity or needed financial assistance: Denies any food insecurity or financial concerns at this time. ACP and Code Status:  SW discussed an Advance Directive which included the patient's choices for care and treatment in the case of a health event that adversely affects decision-making abilities. SW provided education and resources. Elita Severin has no questions at this time and has agreed to keep me up-to-date should anything change. Elita Severin is a Full Code status and has NO advanced directive  - add't info requested. Referral to SW: yes. Collaborative List of SNF/ECF/HH were provided: offered, declined No discharge order at this time. Anticipated Needs/Discharge Plan:  Spoke with patient/family/representative about discharge plan. Patient/Family/Representative verbalizes understanding of the plan of care and denies discharge needs or further services at this time. SW provided business card. SW will continue to monitor needs and assist as appropriate.            Electronically signed by KATHI Mcgraw on 8/11/2023 at 11:41 AM

## 2023-08-11 NOTE — PROGRESS NOTES
rounding on PCU    Assessment: Patient is resting in bed presents as somewhat depressed. She had the problem come on suddenly and was unable to get treatment at another hospital because of equipment being down so she came here. By that time she and her  were both strained and on edge. As this  visited they had become calm but concerned. Nurse manager was able to meet with them and help them understand process. She will be transferred to [facility] in about an hour. They are well connected to their [employer] and have strong family support. Intervention: Engaged in conversation.  provided a listening presence and prayed with them and blessed them both. Patient expressed appreciation for visit and offer of continued prayer. Plan: Chaplains are available on site or on call 24/7 for spiritual and emotional support.

## 2023-08-11 NOTE — CARE COORDINATION
Case Management Assessment  Initial Evaluation    Date/Time of Evaluation: 8/11/2023 11:38 AM  Assessment Completed by: KATHI Ruff    If patient is discharged prior to next notation, then this note serves as note for discharge by case management. Patient Name: Joana Avery                   YOB: 1965  Diagnosis: Paroxysmal supraventricular tachycardia ([address]) [I47.1]  Tachycardia [R00.0]  Elevated troponin [R77.8]  Elevated d-dimer [R79.89]                   Date / Time: 8/10/2023  4:42 PM    Patient Admission Status: Inpatient   Readmission Risk (Low < 19, Mod (19-27), High > 27): Readmission Risk Score: 13.3    Current PCP: Ceferino Mcguire MD  PCP verified by CM? Yes    Chart Reviewed: Yes      History Provided by: Patient  Patient Orientation: Alert and Oriented    Patient Cognition: Alert    Hospitalization in the last 30 days (Readmission):  No    If yes, Readmission Assessment in CM Navigator will be completed. Advance Directives:      Code Status: Full Code   Patient's Primary Decision Maker is: Legal Next of Kin      Discharge Planning:    Patient lives with: Spouse/Significant Other Type of Home: House  Primary Care Giver:    Patient Support Systems include: Spouse/Significant Other, Children, Friends/Neighbors   Current Financial resources:    Current community resources:    Current services prior to admission: None            Current DME:              Type of Home Care services:  None    ADLS  Prior functional level:    Current functional level:      PT AM-PAC:   /24  OT AM-PAC:   /24    Family can provide assistance at DC: Yes  Would you like Case Management to discuss the discharge plan with any other family members/significant others, and if so, who?  No  Plans to Return to Present Housing: Yes  Other Identified Issues/Barriers to RETURNING to current housing: n/a  Potential Assistance needed at discharge: N/A            Potential DME:    Patient expects to discharge to:

## 2023-08-11 NOTE — H&P
[facility]  Procedure History and Physical Update          Patient Name: Charles Willard  MRN:    [id number]  YOB: 1965  Date of evaluation:  8/11/2023    Procedure:    Cardiac cath +/- PCI    Indication for procedure:  NSTEMi      Please refer to the  note completed by Dr. Urszula Juarez on 08/11/2023 in the medical record and note that:    [x] I have examined the patient and reviewed the H&P/Consult and there are no changes to be made to the assessment or plan.     [] I have examined the patient and reviewed the H&P/Consult and have noted the following changes:    Past Medical History:   Diagnosis Date    Anemia     Arthritis     rheumatoid    Asthma     COPD (chronic obstructive pulmonary disease) (HCC)     Diabetes mellitus (HCC)     DJD (degenerative joint disease)     GERD (gastroesophageal reflux disease)     History of bleeding ulcers     Hypertension     Rheumatoid arthritis(714.0)        Past Surgical History:   Procedure Laterality Date    CERVICAL DISCECTOMY      CHOLECYSTECTOMY      COLONOSCOPY  11/08/2010    1CM RECURRENT SESSILE POLYP REMOVED AND CAUTERIZED    COLONOSCOPY  11/08/2010    5MM POLYP AT 20CM FROM ANAL VERGE  REMOVED AND BIOPSIED WITH COLD BIOPSY FORCEPS    HYSTERECTOMY, TOTAL ABDOMINAL (CERVIX REMOVED)  04/12/2011    JOINT REPLACEMENT Bilateral 05/05/2014    Dr Manzano Alt  10/19/2015    removal of L4/L5 hardware and fusion of L3/L4 lumbar spine by Dr. Spencer Hinson Right 08/18/2011    SIJ    OTHER SURGICAL HISTORY  12/11/2012    caudal epidural    OTHER SURGICAL HISTORY Right 05/23/2013     L4 TFE    OTHER SURGICAL HISTORY Right 01/23/2014    HIP INJECTION    SPINE SURGERY  2010    Fusion and cage     TOTAL HIP ARTHROPLASTY Right 06/11/2020       Family History   Problem Relation Age of Onset    Rheum Arthritis Mother     Diabetes Father     Heart Attack Father     Heart Disease Father Class:                  [] I [x] II [] III [] IV     Mallampati Class:       [] I [] II [x] III [] IV      Risks, benefits, and alternatives of cardiac catheterization were discussed, in detail, with patient. Risks include, but not limited to, bleeding, requiring blood transfusion, vascular complication requiring surgery, renal failure with need of dialysis, CVA, MI, death and anesthesia complications including intubation were discussed. Patient verbalized understanding and agreed to proceed with the procedure understanding the above risks and alternatives to the procedure.

## 2023-08-11 NOTE — PROGRESS NOTES
08/11/23 1059   Encounter Summary   Encounter Overview/Reason  Spiritual/Emotional Needs   Service Provided For: Patient and family together   [facility]   Support System Spouse; Family members   Last Encounter  08/11/23   Complexity of Encounter Moderate   Begin Time 1035   End Time  1102   Total Time Calculated 27 min   Assessment/Intervention/Outcome   Assessment Calm   Intervention Active listening;Explored/Affirmed feelings, thoughts, concerns;Prayer (assurance of)/[place];Sustaining Presence/Ministry of presence   Outcome Acceptance; Coping;Engaged in conversation;Expressed Gratitude

## 2023-08-11 NOTE — PLAN OF CARE
Problem: Chronic Conditions and Co-morbidities  Goal: Patient's chronic conditions and co-morbidity symptoms are monitored and maintained or improved  8/11/2023 1754 by Castro Roth RN  Outcome: Adequate for Discharge  8/11/2023 1745 by Castro Roth RN  Outcome: Progressing     Problem: Safety - Adult  Goal: Free from fall injury  8/11/2023 1754 by Castro Roth RN  Outcome: Adequate for Discharge  8/11/2023 1745 by Castro Roth RN  Outcome: Progressing     Problem: Discharge Planning  Goal: Discharge to home or other facility with appropriate resources  8/11/2023 1754 by Castro Roth RN  Outcome: Adequate for Discharge  8/11/2023 1745 by Castro Roth RN  Outcome: Progressing     Problem: Pain  Goal: Verbalizes/displays adequate comfort level or baseline comfort level  Outcome: Adequate for Discharge

## 2023-08-11 NOTE — PROGRESS NOTES
Patient admitted, consent signed and questions answered. Patient ready for procedure. Call light to reach with side rails up 2 of 2. Bilat groin hair clipped with writer and Jayant Santiago present.   at bedside with patient. Redness to lt lower leg noted. Dr Erendira Rothman visited at bedside.

## 2023-08-11 NOTE — RT PROTOCOL NOTE
Arabella Steve, RCPPatient Assessment complete. Paroxysmal supraventricular tachycardia (HCC) [I47.1]  Tachycardia [R00.0]  Elevated troponin [R77.8]  Elevated d-dimer [R79.89] . Vitals:    08/11/23 0538   BP:    Pulse:    Resp: 20   Temp:    SpO2:    . Patients home meds are   Prior to Admission medications    Medication Sig Start Date End Date Taking?  Authorizing Provider   oxybutynin (DITROPAN-XL) 5 MG extended release tablet Take 1 tablet by mouth 2 times daily   Yes Historical Provider, MD   sucralfate (CARAFATE) 1 GM tablet Take 1 tablet by mouth 4 times daily   Yes Historical Provider, MD   Polyethylene Glycol 3350 POWD 17 g every 24 hours 4/10/20  Yes Historical Provider, MD   umeclidinium bromide (INCRUSE ELLIPTA) 62.5 MCG/ACT inhaler Inhale 1 puff into the lungs daily 7/25/23  Yes Historical Provider, MD   metoprolol tartrate (LOPRESSOR) 25 MG tablet Take 1 tablet by mouth 2 times daily   Yes Historical Provider, MD   cetirizine (ZYRTEC) 10 MG chewable tablet Take 1 tablet by mouth daily    Historical Provider, MD   doxycycline hyclate (VIBRA-TABS) 100 MG tablet TAKE 1 TABLET BY MOUTH TWICE DAILY FOR 10 DAYS 7/25/23   Historical Provider, MD   ondansetron (ZOFRAN) 4 MG tablet TAKE 1 TABLET BY MOUTH EVERY 8 HOURS AS NEEDED FOR NAUSEA FOR VOMITING 7/17/23   Historical Provider, MD   traZODone (DESYREL) 150 MG tablet TAKE 2 TABLETS BY MOUTH EVERY DAY AT NIGHT for 3 days 6/3/23   Historical Provider, MD   certolizumab pegol (CIMZIA) 2 X 200 MG/ML PSKT injection Inject 400 mg into the skin every 28 days 8/7/23   JOSE Dan CNP   benzonatate (TESSALON) 200 MG capsule  12/15/22   Historical Provider, MD   tiZANidine (ZANAFLEX) 4 MG tablet Take 1 tablet by mouth every 8 hours as needed 1/20/23   Historical Provider, MD   gabapentin (NEURONTIN) 400 MG capsule  12/14/22   Historical Provider, MD   hydroxychloroquine (PLAQUENIL) 200 MG tablet TAKE ONE TABLET BY MOUTH EVERY MORNING AND 1 TABLET

## 2023-08-11 NOTE — PLAN OF CARE
Problem: Discharge Planning  Goal: Discharge to home or other facility with appropriate resources  Outcome: Progressing  Flowsheets (Taken 8/10/2023 2355)  Discharge to home or other facility with appropriate resources:   Identify barriers to discharge with patient and caregiver   Arrange for needed discharge resources and transportation as appropriate   Identify discharge learning needs (meds, wound care, etc)     Problem: Safety - Adult  Goal: Free from fall injury  Outcome: Progressing     Problem: ABCDS Injury Assessment  Goal: Absence of physical injury  Outcome: Progressing     Problem: Cardiovascular - Adult  Goal: Absence of cardiac dysrhythmias or at baseline  Outcome: Progressing  Flowsheets (Taken 8/10/2023 2355)  Absence of cardiac dysrhythmias or at baseline:   Monitor cardiac rate and rhythm   Administer antiarrhythmia medication and electrolyte replacement as ordered     Problem: Skin/Tissue Integrity - Adult  Goal: Skin integrity remains intact  Outcome: Progressing     Problem: Musculoskeletal - Adult  Goal: Return ADL status to a safe level of function  Outcome: Progressing     Problem: Infection - Adult  Goal: Absence of infection at discharge  Outcome: Progressing     Problem: Metabolic/Fluid and Electrolytes - Adult  Goal: Electrolytes maintained within normal limits  Outcome: Progressing  Goal: Hemodynamic stability and optimal renal function maintained  Outcome: Progressing  Goal: Glucose maintained within prescribed range  Outcome: Progressing     Problem: Pain  Goal: Verbalizes/displays adequate comfort level or baseline comfort level  Outcome: Progressing     Problem: Skin/Tissue Integrity  Goal: Absence of new skin breakdown  Description: 1. Monitor for areas of redness and/or skin breakdown  2. Assess vascular access sites hourly  3. Every 4-6 hours minimum:  Change oxygen saturation probe site  4.   Every 4-6 hours:  If on nasal continuous positive airway pressure, respiratory therapy assess nares and determine need for appliance change or resting period.   Outcome: Progressing     Problem: Chronic Conditions and Co-morbidities  Goal: Patient's chronic conditions and co-morbidity symptoms are monitored and maintained or improved  Outcome: Progressing

## 2023-08-11 NOTE — H&P
HOSPITALIST ADMISSION H&P      REASON FOR ADMISSION:  Tachycardia, Elevated troponin, Elevated D-dimer  ESTIMATED LENGTH OF STAY:> 2 midnights, 3-4 days    ATTENDING/ADMITTING PHYSICIAN: Jenifer Shultz MD  PCP: Brendon Ochoa MD    HISTORY OF PRESENT ILLNESS:      The patient is a 59 y.o. female patient of Brendon Ochoa MD who presents from ER with c/o Fever and sore throat. Patient reports she woke up today with a sore throat and felt like she couldn't breathe, ate lunch then vomited and her throat felt like it was on fire. Patient reports she went to [facility] ER and they told her there was nothing they could do for her so she left and came here, when she arrived here her heart rate was 208 and EKG showed SVT and was cardioverted with adenosine. Patient currently states she feels better, denies chest pain, shortness of breath, nausea, constipation, or diarrhea. Does c/o chronic back pain currently rates 8/10. DMII: HgbA1C poct 6.4 on 8/4/23    RA: Chronic steroid use: Check cortisol level. ECHO: 8/19/2018  FINDINGS  Left atrium is normal in size. Left ventricle is normal in size Global left ventricular systolic function  is normal Estimated ejection fraction is 60 % . Mild to moderate left ventricular hypertrophy. Grade I (mild) left ventricular diastolic dysfunction. Right atrium is normal in size. Normal right ventricular size and function. Normal mitral valve structure and function. Normal aortic valve structure and function without stenosis or  regurgitation. Normal tricuspid valve structure and function. The pulmonic valve is normal in structure. No pericardial effusion seen. Miscellaneous  Normal aortic root dimension.      M-mode / 2D Measurements & Calculations:      LVIDd:2.99 cm(3.7 - 5.6 cm)      Diastolic JDYAMP:363.8 ml   LVIDs:2.46 cm(2.2 - 4.0 cm)      Systolic SPAIBP:07.1 ml   IVSd:1.4 cm(0.6 - 1.1 cm)        Aortic Root:2.6 cm(2.0 - 3.7 cm)   LVPWd:1.38 cm(0.6 - 1.1 cm)

## 2023-08-14 LAB
EKG ATRIAL RATE: 111 BPM
EKG ATRIAL RATE: 69 BPM
EKG ATRIAL RATE: 99 BPM
EKG P AXIS: 41 DEGREES
EKG P AXIS: 41 DEGREES
EKG P AXIS: 54 DEGREES
EKG P-R INTERVAL: 138 MS
EKG P-R INTERVAL: 140 MS
EKG P-R INTERVAL: 142 MS
EKG Q-T INTERVAL: 208 MS
EKG Q-T INTERVAL: 342 MS
EKG Q-T INTERVAL: 362 MS
EKG Q-T INTERVAL: 480 MS
EKG QRS DURATION: 110 MS
EKG QRS DURATION: 112 MS
EKG QRS DURATION: 118 MS
EKG QRS DURATION: 118 MS
EKG QTC CALCULATION (BAZETT): 388 MS
EKG QTC CALCULATION (BAZETT): 464 MS
EKG QTC CALCULATION (BAZETT): 465 MS
EKG QTC CALCULATION (BAZETT): 514 MS
EKG R AXIS: 56 DEGREES
EKG R AXIS: 58 DEGREES
EKG R AXIS: 9 DEGREES
EKG R AXIS: 94 DEGREES
EKG T AXIS: -20 DEGREES
EKG T AXIS: -34 DEGREES
EKG T AXIS: -4 DEGREES
EKG T AXIS: 20 DEGREES
EKG VENTRICULAR RATE: 111 BPM
EKG VENTRICULAR RATE: 210 BPM
EKG VENTRICULAR RATE: 69 BPM
EKG VENTRICULAR RATE: 99 BPM

## 2023-08-15 ENCOUNTER — TELEPHONE (OUTPATIENT)
Dept: RHEUMATOLOGY | Age: 58
End: 2023-08-15

## 2023-08-15 NOTE — TELEPHONE ENCOUNTER
Pt called and stated that she was in the ER on Friday 8/11 for tachycardia, they performed a cardiac cath on her and she stated that nothing was found and she was not sent home with any new meds. She would like to know if she is okay to continue meds prescribed by our office. Please advise.

## 2023-08-16 ENCOUNTER — TELEPHONE (OUTPATIENT)
Dept: RHEUMATOLOGY | Age: 58
End: 2023-08-16

## 2023-08-16 NOTE — TELEPHONE ENCOUNTER
----- Message from Nissa Prieto DO sent at 8/7/2023  5:01 PM EDT -----  The CAT scan of the lungs   Revealed no significant abnormalities.

## 2023-08-16 NOTE — OP NOTE
cc      Conclusions:    Mild Nonobstructive coronary artery disease    Normal LV systolic function. Normal LVEDP. Recommendations:    Post cardiac cath protocol is recommended. Patient management should include: Aggressive risk factor modification and optimal medical management. Increase Lopressor dose to 37.5 mg BID.       Latrice Gordon MD, Shawn Son

## 2023-08-18 DIAGNOSIS — M05.79 RHEUMATOID ARTHRITIS INVOLVING MULTIPLE SITES WITH POSITIVE RHEUMATOID FACTOR (HCC): ICD-10-CM

## 2023-08-21 RX ORDER — PREDNISONE 2.5 MG/1
TABLET ORAL
Qty: 90 TABLET | Refills: 3 | Status: SHIPPED | OUTPATIENT
Start: 2023-08-21

## 2023-08-23 DIAGNOSIS — M05.79 RHEUMATOID ARTHRITIS INVOLVING MULTIPLE SITES WITH POSITIVE RHEUMATOID FACTOR (HCC): ICD-10-CM

## 2023-08-23 RX ORDER — CERTOLIZUMAB PEGOL 200 MG/ML
INJECTION, SOLUTION SUBCUTANEOUS
Qty: 1 EACH | Refills: 0 | OUTPATIENT
Start: 2023-08-23

## 2023-08-29 DIAGNOSIS — M05.79 RHEUMATOID ARTHRITIS INVOLVING MULTIPLE SITES WITH POSITIVE RHEUMATOID FACTOR (HCC): ICD-10-CM

## 2023-08-29 RX ORDER — CERTOLIZUMAB PEGOL 200 MG/ML
INJECTION, SOLUTION SUBCUTANEOUS
Qty: 1 EACH | Refills: 0 | OUTPATIENT
Start: 2023-08-29

## 2023-09-07 ENCOUNTER — OFFICE VISIT (OUTPATIENT)
Dept: CARDIOLOGY | Age: 58
End: 2023-09-07
Payer: COMMERCIAL

## 2023-09-07 VITALS
HEART RATE: 74 BPM | WEIGHT: 174.9 LBS | HEIGHT: 66 IN | BODY MASS INDEX: 28.11 KG/M2 | DIASTOLIC BLOOD PRESSURE: 66 MMHG | OXYGEN SATURATION: 94 % | SYSTOLIC BLOOD PRESSURE: 98 MMHG

## 2023-09-07 DIAGNOSIS — I21.4 NSTEMI (NON-ST ELEVATED MYOCARDIAL INFARCTION) (HCC): Primary | ICD-10-CM

## 2023-09-07 PROCEDURE — G8419 CALC BMI OUT NRM PARAM NOF/U: HCPCS | Performed by: INTERNAL MEDICINE

## 2023-09-07 PROCEDURE — 99214 OFFICE O/P EST MOD 30 MIN: CPT | Performed by: INTERNAL MEDICINE

## 2023-09-07 PROCEDURE — 3078F DIAST BP <80 MM HG: CPT | Performed by: INTERNAL MEDICINE

## 2023-09-07 PROCEDURE — G8427 DOCREV CUR MEDS BY ELIG CLIN: HCPCS | Performed by: INTERNAL MEDICINE

## 2023-09-07 PROCEDURE — 1111F DSCHRG MED/CURRENT MED MERGE: CPT | Performed by: INTERNAL MEDICINE

## 2023-09-07 PROCEDURE — 3074F SYST BP LT 130 MM HG: CPT | Performed by: INTERNAL MEDICINE

## 2023-09-07 PROCEDURE — 3017F COLORECTAL CA SCREEN DOC REV: CPT | Performed by: INTERNAL MEDICINE

## 2023-09-07 PROCEDURE — 4004F PT TOBACCO SCREEN RCVD TLK: CPT | Performed by: INTERNAL MEDICINE

## 2023-09-07 NOTE — PROGRESS NOTES
thirst, fluid intake, or urination. No tremor. Hematologic/Lymphatic: No abnormal bruising or bleeding, blood clots or swollen lymph nodes. Allergic/Immunologic: No nasal congestion or hives. Medications:  Current Outpatient Medications   Medication Sig Dispense Refill    predniSONE (DELTASONE) 2.5 MG tablet TAKE THREE TABLETS BY MOUTH ONCE A DAY 90 tablet 3    oxybutynin (DITROPAN-XL) 5 MG extended release tablet Take 1 tablet by mouth 2 times daily      sucralfate (CARAFATE) 1 GM tablet Take 1 tablet by mouth 4 times daily      metoprolol tartrate 37.5 MG TABS Take 25 mg by mouth 2 times daily (Patient taking differently: Take 37.5 mg by mouth 2 times daily) 60 tablet 3    cetirizine (ZYRTEC) 10 MG chewable tablet Take 1 tablet by mouth daily      doxycycline hyclate (VIBRA-TABS) 100 MG tablet TAKE 1 TABLET BY MOUTH TWICE DAILY FOR 10 DAYS      ondansetron (ZOFRAN) 4 MG tablet TAKE 1 TABLET BY MOUTH EVERY 8 HOURS AS NEEDED FOR NAUSEA FOR VOMITING      Polyethylene Glycol 3350 POWD 17 g every 24 hours      umeclidinium bromide (INCRUSE ELLIPTA) 62.5 MCG/ACT inhaler Inhale 1 puff into the lungs daily      traZODone (DESYREL) 150 MG tablet TAKE 2 TABLETS BY MOUTH EVERY DAY AT NIGHT for 3 days      certolizumab pegol (CIMZIA) 2 X 200 MG/ML PSKT injection Inject 400 mg into the skin every 28 days 1 each 5    benzonatate (TESSALON) 200 MG capsule       tiZANidine (ZANAFLEX) 4 MG tablet Take 1 tablet by mouth every 8 hours as needed      gabapentin (NEURONTIN) 400 MG capsule       hydroxychloroquine (PLAQUENIL) 200 MG tablet TAKE ONE TABLET BY MOUTH EVERY MORNING AND 1 TABLET EVERY NIGHT 60 tablet 5    Handicap Placard MISC by Does not apply route Expires May 2, 2027 1 each 0    Misc.  Devices (WHEELCHAIR CUSHION) MISC Wheelchair cushion 1 each 0    HYDROcodone-acetaminophen (NORCO)  MG per tablet Take 1 tablet by mouth every 6 hours as needed for Pain.      montelukast (SINGULAIR) 10 MG tablet TAKE 1 TABLET BY

## 2023-09-09 PROBLEM — R77.8 ELEVATED TROPONIN: Status: RESOLVED | Noted: 2023-08-10 | Resolved: 2023-09-09

## 2023-09-09 PROBLEM — R79.89 ELEVATED TROPONIN: Status: RESOLVED | Noted: 2023-08-10 | Resolved: 2023-09-09

## 2023-10-31 ENCOUNTER — OFFICE VISIT (OUTPATIENT)
Dept: RHEUMATOLOGY | Age: 58
End: 2023-10-31
Payer: COMMERCIAL

## 2023-10-31 ENCOUNTER — TELEPHONE (OUTPATIENT)
Dept: RHEUMATOLOGY | Age: 58
End: 2023-10-31

## 2023-10-31 VITALS
OXYGEN SATURATION: 95 % | BODY MASS INDEX: 27.97 KG/M2 | DIASTOLIC BLOOD PRESSURE: 60 MMHG | HEIGHT: 66 IN | HEART RATE: 62 BPM | WEIGHT: 174 LBS | SYSTOLIC BLOOD PRESSURE: 108 MMHG

## 2023-10-31 DIAGNOSIS — M05.79 RHEUMATOID ARTHRITIS INVOLVING MULTIPLE SITES WITH POSITIVE RHEUMATOID FACTOR (HCC): Primary | ICD-10-CM

## 2023-10-31 DIAGNOSIS — Z51.81 MEDICATION MONITORING ENCOUNTER: ICD-10-CM

## 2023-10-31 DIAGNOSIS — M06.30 RHEUMATOID NODULES (HCC): ICD-10-CM

## 2023-10-31 DIAGNOSIS — M80.00XA OSTEOPOROSIS WITH CURRENT PATHOLOGICAL FRACTURE, UNSPECIFIED OSTEOPOROSIS TYPE, INITIAL ENCOUNTER: ICD-10-CM

## 2023-10-31 DIAGNOSIS — G89.29 CHRONIC BILATERAL LOW BACK PAIN WITH RIGHT-SIDED SCIATICA: ICD-10-CM

## 2023-10-31 DIAGNOSIS — M54.41 CHRONIC BILATERAL LOW BACK PAIN WITH RIGHT-SIDED SCIATICA: ICD-10-CM

## 2023-10-31 PROCEDURE — 3017F COLORECTAL CA SCREEN DOC REV: CPT | Performed by: INTERNAL MEDICINE

## 2023-10-31 PROCEDURE — 99214 OFFICE O/P EST MOD 30 MIN: CPT | Performed by: INTERNAL MEDICINE

## 2023-10-31 PROCEDURE — G8419 CALC BMI OUT NRM PARAM NOF/U: HCPCS | Performed by: INTERNAL MEDICINE

## 2023-10-31 PROCEDURE — 3074F SYST BP LT 130 MM HG: CPT | Performed by: INTERNAL MEDICINE

## 2023-10-31 PROCEDURE — G8484 FLU IMMUNIZE NO ADMIN: HCPCS | Performed by: INTERNAL MEDICINE

## 2023-10-31 PROCEDURE — 4004F PT TOBACCO SCREEN RCVD TLK: CPT | Performed by: INTERNAL MEDICINE

## 2023-10-31 PROCEDURE — 3078F DIAST BP <80 MM HG: CPT | Performed by: INTERNAL MEDICINE

## 2023-10-31 PROCEDURE — G8427 DOCREV CUR MEDS BY ELIG CLIN: HCPCS | Performed by: INTERNAL MEDICINE

## 2023-10-31 ASSESSMENT — JOINT PAIN
TOTAL NUMBER OF TENDER JOINTS: 1
TOTAL NUMBER OF SWOLLEN JOINTS: 0

## 2023-10-31 ASSESSMENT — ENCOUNTER SYMPTOMS
TROUBLE SWALLOWING: 0
DIARRHEA: 0
EYE ITCHING: 0
NAUSEA: 0
ABDOMINAL PAIN: 0
COUGH: 0
BACK PAIN: 1
CONSTIPATION: 0
SHORTNESS OF BREATH: 0
EYE PAIN: 0

## 2023-10-31 NOTE — PROGRESS NOTES
[facility] RHEUMATOLOGY FOLLOW UP NOTE       Date Of Service: 10/31/2023  Provider: Josh Duggan DO    Name: Daylin Erwin   MRN: [id number]    Chief Complaint(s)     Chief Complaint   Patient presents with    Follow-up     2 month follow up rheumatoid arthritis        History of Present Illness (HPI)     Daylin Erwin  is a(n)57 y.o. female with a hx of chronic low back pain, osteoporosis, rheumatoid arthritis, osteoarthritis of multiple joints, chronic tobacco dependence, osteoarthritis bilat hips s/p bilat THR here for the f/u evaluation of rheumatoid arthritis. , osteoporosis. Rheumatoid Arthritis  / osteoarthritis  /low back pain  -- cimzia started started sept 2023. -- reported abd pain, , decreased appetitis for a couple weeks after the injection. Reports genearlized (hansd, shoulder, knees, feet, back) body pain w/ pain up to 9.5/10 over the past weeks. Reported joint swelling of the knees and intermittent in the hands. Alleviating: no relief. - tinling of feet. - Continued swelling in the bilateral hands    Reported weakness in bilateral legs reported. Getting around in wheel chair mainly at home. Occasional sharp pain in the legs since start the cimzia. Worsned balance during this time periord       Stopped smoking Oct 1st, 2023     Back surgery scheduled for January 9th, 2023 - at [facility] (ROS)    Review of Systems   Constitutional:  Negative for fatigue, fever and unexpected weight change. HENT:  Negative for congestion and trouble swallowing. Dry mouth   Eyes:  Negative for pain and itching. Respiratory:  Negative for cough and shortness of breath. Cardiovascular:  Negative for chest pain and leg swelling. Gastrointestinal:  Negative for abdominal pain, constipation, diarrhea and nausea. Endocrine: Negative for cold intolerance and heat intolerance. Genitourinary:  Negative for difficulty urinating, frequency and urgency.

## 2023-10-31 NOTE — TELEPHONE ENCOUNTER
While checking out patient states she needed new script for handicap placard. She would like it mailed to her.

## 2023-11-17 LAB
ALBUMIN SERPL-MCNC: 3.6 G/DL
ALP BLD-CCNC: 42 U/L
ALT SERPL-CCNC: 8 U/L
ANION GAP SERPL CALCULATED.3IONS-SCNC: 10 MMOL/L
AST SERPL-CCNC: 12 U/L
BASOPHILS ABSOLUTE: 0.1 /ΜL
BASOPHILS RELATIVE PERCENT: 0.9 %
BILIRUB SERPL-MCNC: 0.3 MG/DL (ref 0.1–1.4)
BUN BLDV-MCNC: 17 MG/DL
C-REACTIVE PROTEIN: 0.1
CALCIUM SERPL-MCNC: 8.7 MG/DL
CHLORIDE BLD-SCNC: 4 MMOL/L
CO2: 29 MMOL/L
CREAT SERPL-MCNC: 0.75 MG/DL
EGFR: 90
EOSINOPHILS ABSOLUTE: 0.1 /ΜL
EOSINOPHILS RELATIVE PERCENT: 1.8 %
GLUCOSE BLD-MCNC: 166 MG/DL
HCT VFR BLD CALC: 36.7 % (ref 36–46)
HEMOGLOBIN: 11.7 G/DL (ref 12–16)
LYMPHOCYTES ABSOLUTE: 1.3 /ΜL
LYMPHOCYTES RELATIVE PERCENT: 19.3 %
MCH RBC QN AUTO: 26.3 PG
MCHC RBC AUTO-ENTMCNC: 31.9 G/DL
MCV RBC AUTO: 83 FL
MONOCYTES ABSOLUTE: 0.5 /ΜL
MONOCYTES RELATIVE PERCENT: 6.6 %
NEUTROPHILS ABSOLUTE: 4.9 /ΜL
NEUTROPHILS RELATIVE PERCENT: 71.4 %
PDW BLD-RTO: 16.3 %
PLATELET # BLD: 165 K/ΜL
PMV BLD AUTO: 10.2 FL
POTASSIUM SERPL-SCNC: 101 MMOL/L
RBC # BLD: 4.44 10^6/ΜL
SEDIMENTATION RATE, ERYTHROCYTE: 51
SODIUM BLD-SCNC: 140 MMOL/L
TOTAL PROTEIN: 6.4
WBC # BLD: 6.8 10^3/ML

## 2023-11-22 ENCOUNTER — TELEPHONE (OUTPATIENT)
Dept: RHEUMATOLOGY | Age: 58
End: 2023-11-22

## 2023-11-22 NOTE — TELEPHONE ENCOUNTER
----- Message from [facility], DO sent at 11/22/2023  2:23 PM EST -----  Testing notable for an elevated blood sugar, and continued elevation of the sed rate.   Please have your labs repeated in 8 weeks

## 2023-11-28 ENCOUNTER — OFFICE VISIT (OUTPATIENT)
Dept: RHEUMATOLOGY | Age: 58
End: 2023-11-28
Payer: COMMERCIAL

## 2023-11-28 VITALS
BODY MASS INDEX: 27.97 KG/M2 | DIASTOLIC BLOOD PRESSURE: 60 MMHG | WEIGHT: 174 LBS | HEART RATE: 73 BPM | SYSTOLIC BLOOD PRESSURE: 116 MMHG | OXYGEN SATURATION: 95 % | HEIGHT: 66 IN

## 2023-11-28 DIAGNOSIS — J01.00 ACUTE NON-RECURRENT MAXILLARY SINUSITIS: Primary | ICD-10-CM

## 2023-11-28 PROCEDURE — G8419 CALC BMI OUT NRM PARAM NOF/U: HCPCS | Performed by: INTERNAL MEDICINE

## 2023-11-28 PROCEDURE — G8484 FLU IMMUNIZE NO ADMIN: HCPCS | Performed by: INTERNAL MEDICINE

## 2023-11-28 PROCEDURE — 3017F COLORECTAL CA SCREEN DOC REV: CPT | Performed by: INTERNAL MEDICINE

## 2023-11-28 PROCEDURE — 99214 OFFICE O/P EST MOD 30 MIN: CPT | Performed by: INTERNAL MEDICINE

## 2023-11-28 PROCEDURE — G8427 DOCREV CUR MEDS BY ELIG CLIN: HCPCS | Performed by: INTERNAL MEDICINE

## 2023-11-28 PROCEDURE — 4004F PT TOBACCO SCREEN RCVD TLK: CPT | Performed by: INTERNAL MEDICINE

## 2023-11-28 PROCEDURE — 3078F DIAST BP <80 MM HG: CPT | Performed by: INTERNAL MEDICINE

## 2023-11-28 PROCEDURE — 3074F SYST BP LT 130 MM HG: CPT | Performed by: INTERNAL MEDICINE

## 2023-11-28 RX ORDER — AMOXICILLIN AND CLAVULANATE POTASSIUM 875; 125 MG/1; MG/1
1 TABLET, FILM COATED ORAL 2 TIMES DAILY
Qty: 14 TABLET | Refills: 0 | Status: SHIPPED | OUTPATIENT
Start: 2023-11-28 | End: 2023-11-28

## 2023-11-28 RX ORDER — AMOXICILLIN AND CLAVULANATE POTASSIUM 875; 125 MG/1; MG/1
1 TABLET, FILM COATED ORAL 2 TIMES DAILY
Qty: 14 TABLET | Refills: 0 | Status: SHIPPED | OUTPATIENT
Start: 2023-11-28 | End: 2023-12-05

## 2023-11-28 ASSESSMENT — ENCOUNTER SYMPTOMS
DIARRHEA: 0
NAUSEA: 0
ABDOMINAL PAIN: 0
SHORTNESS OF BREATH: 0
CONSTIPATION: 0
BACK PAIN: 1
EYE ITCHING: 0
COUGH: 0
TROUBLE SWALLOWING: 0
EYE PAIN: 0

## 2023-11-28 NOTE — PROGRESS NOTES
[facility] RHEUMATOLOGY FOLLOW UP NOTE       Date Of Service: 11/28/2023  Provider: Nayana Cardenas DO    Name: Tommie Borja   MRN: [id number]    Chief Complaint(s)     Chief Complaint   Patient presents with    Follow-up        History of Present Illness (HPI)     Tommie Borja  is a(n)57 y.o. female with a hx of chronic low back pain, osteoporosis, rheumatoid arthritis, osteoarthritis of multiple joints, chronic tobacco dependence, osteoarthritis bilat hips s/p bilat THR here for the f/u evaluation of rheumatoid arthritis. , osteoporosis. Rheumatoid arthritis - off the cimiza this month - reported less abdominal pain , and nausea since missing the subsequent dosing. Active rheumatoid nodule and synovitis. sore throat for the past 1 to 1.5 weeks - sinus congestion / pressure for the past week. - change of voice - hoarseness for the past couple weeks, dysphagia -- occasionally with solid with medications . + chills and sweats intermittently occurring for the psat week. + drenching weight loss. PCP screened fro strep - negative. -- denies fever. -- Stopped smoking Oct 1st, 2023       REVIEW OF SYSTEMS: (ROS)    Review of Systems   Constitutional:  Negative for fatigue, fever and unexpected weight change. HENT:  Negative for congestion and trouble swallowing. Dry mouth   Eyes:  Negative for pain and itching. Respiratory:  Negative for cough and shortness of breath. Cardiovascular:  Negative for chest pain and leg swelling. Gastrointestinal:  Negative for abdominal pain, constipation, diarrhea and nausea. Endocrine: Negative for cold intolerance and heat intolerance. Genitourinary:  Negative for difficulty urinating, frequency and urgency. Musculoskeletal:  Positive for back pain. Negative for joint swelling. Skin:  Negative for rash. Neurological:  Positive for weakness and numbness. Negative for dizziness and headaches.    Psychiatric/Behavioral:  The patient is not

## 2023-12-06 ENCOUNTER — TELEPHONE (OUTPATIENT)
Dept: RHEUMATOLOGY | Age: 58
End: 2023-12-06

## 2023-12-06 ENCOUNTER — TELEPHONE (OUTPATIENT)
Dept: CARDIOLOGY | Age: 58
End: 2023-12-06

## 2023-12-06 NOTE — TELEPHONE ENCOUNTER
True Jose Manuel called and is needing the PA and the Clinic notes still, I did not see anything about the PA in her chart, they stated that Kyree Kerr was working on this with them.

## 2023-12-06 NOTE — TELEPHONE ENCOUNTER
Received request for cardiac clearance for T4-pelvis surgery at [facility] on 1/9/24. Please review the attached clearance request and advise.      Last Appt:  9/7/2023  Next Appt:   3/7/2024  Med verified in [employer]

## 2023-12-14 RX ORDER — METOPROLOL TARTRATE 37.5 MG/1
37.5 TABLET, FILM COATED ORAL 2 TIMES DAILY
Qty: 180 TABLET | Refills: 3 | Status: SHIPPED | OUTPATIENT
Start: 2023-12-14

## 2023-12-14 NOTE — TELEPHONE ENCOUNTER
Pt  called stating they are getting calls from [facility] that the pt still needs clearance. Please advise [facility] pt does not need to have this. Write sees it stated a call went to them already but they are still calling pt about it. Metoprolol prescription pended due to being denied somewhere else.     37.5 mg BID to clinic [facility]  [phone]

## 2023-12-14 NOTE — TELEPHONE ENCOUNTER
[facility] fax giving clearance and emphasing that the patient does NOT need to be seen prior to clearance. LM for [facility] with this message as well.

## 2024-01-02 ENCOUNTER — TELEPHONE (OUTPATIENT)
Dept: RHEUMATOLOGY | Age: 59
End: 2024-01-02

## 2024-01-02 DIAGNOSIS — M05.79 RHEUMATOID ARTHRITIS INVOLVING MULTIPLE SITES WITH POSITIVE RHEUMATOID FACTOR (HCC): Primary | ICD-10-CM

## 2024-01-02 NOTE — TELEPHONE ENCOUNTER
Pt is calling today, she left 2 messages last week and has not heard back.  She is under a lot of stress and feels this is causing her arthritis to act up.  She is wondering if there is anything the doctor can do for her, he has given her Prednisone in the past or a shot.  She is scheduled for surgery on the ninth and would love to have some relief before this. Please give her a call at ph. [phone]

## 2024-01-03 RX ORDER — METHYLPREDNISOLONE 4 MG/1
TABLET ORAL
Qty: 21 TABLET | Refills: 0 | Status: SHIPPED | OUTPATIENT
Start: 2024-01-03 | End: 2024-01-09

## 2024-01-03 NOTE — TELEPHONE ENCOUNTER
Pt called and reviewed voiced she doesn't want to take injections, informed pt I would let the provider know however depending on what she's tried, side effect etc we may be limited and if she's declining a injection we may have to do an infusion. Pt then stated she would try another medication or a med that Rachel gave her but she never took, pt thought it was humira (we have that she has an intolerance to that but not what)

## 2024-01-03 NOTE — TELEPHONE ENCOUNTER
Diagnosis Orders   1. Rheumatoid arthritis involving multiple sites with positive rheumatoid factor (HCC)  methylPREDNISolone (MEDROL DOSEPACK) 4 MG tablet        - we need to restart the cimzia or other treatment options that have helped with the inflammatory arthritis. Continue to use prednisone is not a good long term option.

## 2024-01-03 NOTE — TELEPHONE ENCOUNTER
Where is pain located?  Bilateral hands, shoulder, knees and ankles Rt hand is worse, Lt ankle, LT knee   When did the pain start?  Aprox 2-3 weeks  Rate the pain 1-10. Ten being the worst    Describe the pain.  (Dull, Aching, Stabbing, radiating, etc)  throbbing  Has this pain occurred in the past?  With flare   What have you used to alleviate this pain?  Voltaren gel   What aggravates it?  moving  Joint swelling or redness?  Swelling joint pain redness       Doesn't want to go into her back surgery January 9th  feeling this way

## 2024-01-10 NOTE — TELEPHONE ENCOUNTER
Cheri Florian, Jaleesa Suresh, LPN7 days ago       We can pursue Simpsoni Aria - this is similar to humira. This medication is a subcutaneous injection that is done once every 4 weeks.

## 2024-02-19 DIAGNOSIS — M05.79 RHEUMATOID ARTHRITIS INVOLVING MULTIPLE SITES WITH POSITIVE RHEUMATOID FACTOR (HCC): ICD-10-CM

## 2024-02-19 RX ORDER — HYDROXYCHLOROQUINE SULFATE 200 MG/1
TABLET, FILM COATED ORAL
Qty: 60 TABLET | Refills: 5 | Status: SHIPPED | OUTPATIENT
Start: 2024-02-19

## 2024-03-05 ENCOUNTER — OFFICE VISIT (OUTPATIENT)
Dept: RHEUMATOLOGY | Age: 59
End: 2024-03-05
Payer: COMMERCIAL

## 2024-03-05 VITALS
SYSTOLIC BLOOD PRESSURE: 104 MMHG | HEART RATE: 73 BPM | WEIGHT: 174 LBS | HEIGHT: 66 IN | BODY MASS INDEX: 27.97 KG/M2 | DIASTOLIC BLOOD PRESSURE: 60 MMHG | OXYGEN SATURATION: 96 %

## 2024-03-05 DIAGNOSIS — G89.29 CHRONIC BILATERAL LOW BACK PAIN WITH RIGHT-SIDED SCIATICA: ICD-10-CM

## 2024-03-05 DIAGNOSIS — M54.41 CHRONIC BILATERAL LOW BACK PAIN WITH RIGHT-SIDED SCIATICA: ICD-10-CM

## 2024-03-05 DIAGNOSIS — M80.00XA OSTEOPOROSIS WITH CURRENT PATHOLOGICAL FRACTURE, UNSPECIFIED OSTEOPOROSIS TYPE, INITIAL ENCOUNTER: ICD-10-CM

## 2024-03-05 DIAGNOSIS — M05.79 RHEUMATOID ARTHRITIS INVOLVING MULTIPLE SITES WITH POSITIVE RHEUMATOID FACTOR (HCC): Primary | ICD-10-CM

## 2024-03-05 DIAGNOSIS — M06.30 RHEUMATOID NODULES (HCC): ICD-10-CM

## 2024-03-05 DIAGNOSIS — Z51.81 MEDICATION MONITORING ENCOUNTER: ICD-10-CM

## 2024-03-05 PROCEDURE — 99214 OFFICE O/P EST MOD 30 MIN: CPT | Performed by: INTERNAL MEDICINE

## 2024-03-05 PROCEDURE — 4004F PT TOBACCO SCREEN RCVD TLK: CPT | Performed by: INTERNAL MEDICINE

## 2024-03-05 PROCEDURE — 3017F COLORECTAL CA SCREEN DOC REV: CPT | Performed by: INTERNAL MEDICINE

## 2024-03-05 PROCEDURE — G8484 FLU IMMUNIZE NO ADMIN: HCPCS | Performed by: INTERNAL MEDICINE

## 2024-03-05 PROCEDURE — G8427 DOCREV CUR MEDS BY ELIG CLIN: HCPCS | Performed by: INTERNAL MEDICINE

## 2024-03-05 PROCEDURE — 3074F SYST BP LT 130 MM HG: CPT | Performed by: INTERNAL MEDICINE

## 2024-03-05 PROCEDURE — 3078F DIAST BP <80 MM HG: CPT | Performed by: INTERNAL MEDICINE

## 2024-03-05 PROCEDURE — G8419 CALC BMI OUT NRM PARAM NOF/U: HCPCS | Performed by: INTERNAL MEDICINE

## 2024-03-05 RX ORDER — PREGABALIN 25 MG/1
25 CAPSULE ORAL 3 TIMES DAILY
COMMUNITY
Start: 2024-02-23

## 2024-03-05 RX ORDER — LEFLUNOMIDE 20 MG/1
20 TABLET ORAL WEEKLY
Qty: 8 TABLET | Refills: 0 | Status: SHIPPED | OUTPATIENT
Start: 2024-03-05

## 2024-03-05 ASSESSMENT — ENCOUNTER SYMPTOMS
EYE PAIN: 0
TROUBLE SWALLOWING: 0
SHORTNESS OF BREATH: 0
DIARRHEA: 0
EYE ITCHING: 0
BACK PAIN: 1
CONSTIPATION: 0
NAUSEA: 0
COUGH: 0
ABDOMINAL PAIN: 0

## 2024-03-05 ASSESSMENT — JOINT PAIN
TOTAL NUMBER OF TENDER JOINTS: 3
TOTAL NUMBER OF SWOLLEN JOINTS: 0

## 2024-03-05 NOTE — PROGRESS NOTES
predicted or 2.43 L.  FEF 25-75 is 104% predicted or 2.40 L.  There is no   significant post bronchodilator response.   Slow vital capacity is very mildly reduced at 78% predicted or 2.40 L with   total lung capacity 89% of predicted or 4.66 L. diffusion capacity is   normal.     Impression:   Mild restrictive physiology is demonstrated.  Gas transfer is intact.     Please correlate with clinical and radiographic      Tere Oreilly MD - 02/07/2023   Formatting of this note might be different from the original.   THORACIC SPINE, 3 VIEWS     CLINICAL STATEMENT: Mid back pain.     TECHNIQUE: 3 views including AP view, lateral view and localized lateral view at cervical thoracic junction.     FINDINGS:  The heights of the vertebral bodies are unchanged since the previous study from 10/12/2021.  There is dextroscoliosis of the thoracic spine the angle measures 31 degrees.   Multilevel degenerative disc disease more prominent in the mid and lower thoracic spine.   The visualized part of the lung and mediastinum is unremarkable.  The bowel gas pattern is unremarkable.     IMPRESSION:     There is dextroscoliosis of the thoracic spine the angle measures 31 degrees.   Multilevel degenerative disc disease more prominent in the mid and lower thoracic spine.     Workstation:[id number]     Finalized by Tere Oreilly MD on 2/7/2023 7:27 PM    ASSESSMENT/PLAN    Assessment   Plan   # Seropositive rheumatoid arthritis +CCP >300, RF >360 -- active w/ continued synovitis & nodulosis      # Rheumatoid nodulosis - resolved.      - diagnosed in 30's  - prior tx: methotrexate PO (nausea), subcu (LFT elevation), xeljanz (diarrhea and vomiting), humira (nausea), arava (hair loss), enbrel 2022  (rash, vomiting, vision changes), rituxan (rash, fever, increased joint pains), kevzara (increased joint pain, abdominal pain) rituximab August 2022: Rash status post infusion lasting a couple days . Orencia (2023) - abdominal pain.

## 2024-04-09 DIAGNOSIS — M05.79 RHEUMATOID ARTHRITIS INVOLVING MULTIPLE SITES WITH POSITIVE RHEUMATOID FACTOR (HCC): ICD-10-CM

## 2024-04-09 RX ORDER — PREDNISONE 2.5 MG/1
TABLET ORAL
Qty: 90 TABLET | Refills: 3 | Status: SHIPPED | OUTPATIENT
Start: 2024-04-09

## 2024-04-23 ENCOUNTER — OFFICE VISIT (OUTPATIENT)
Age: 59
End: 2024-04-23
Payer: COMMERCIAL

## 2024-04-23 VITALS
HEART RATE: 80 BPM | BODY MASS INDEX: 38.28 KG/M2 | SYSTOLIC BLOOD PRESSURE: 101 MMHG | OXYGEN SATURATION: 94 % | HEIGHT: 60 IN | DIASTOLIC BLOOD PRESSURE: 63 MMHG | WEIGHT: 195 LBS

## 2024-04-23 DIAGNOSIS — I21.4 NSTEMI (NON-ST ELEVATED MYOCARDIAL INFARCTION) (HCC): Primary | ICD-10-CM

## 2024-04-23 PROCEDURE — 3017F COLORECTAL CA SCREEN DOC REV: CPT | Performed by: INTERNAL MEDICINE

## 2024-04-23 PROCEDURE — 3078F DIAST BP <80 MM HG: CPT | Performed by: INTERNAL MEDICINE

## 2024-04-23 PROCEDURE — 4004F PT TOBACCO SCREEN RCVD TLK: CPT | Performed by: INTERNAL MEDICINE

## 2024-04-23 PROCEDURE — G8417 CALC BMI ABV UP PARAM F/U: HCPCS | Performed by: INTERNAL MEDICINE

## 2024-04-23 PROCEDURE — 3074F SYST BP LT 130 MM HG: CPT | Performed by: INTERNAL MEDICINE

## 2024-04-23 PROCEDURE — 93000 ELECTROCARDIOGRAM COMPLETE: CPT | Performed by: INTERNAL MEDICINE

## 2024-04-23 PROCEDURE — 99214 OFFICE O/P EST MOD 30 MIN: CPT | Performed by: INTERNAL MEDICINE

## 2024-04-23 PROCEDURE — G8427 DOCREV CUR MEDS BY ELIG CLIN: HCPCS | Performed by: INTERNAL MEDICINE

## 2024-04-23 NOTE — PROGRESS NOTES
Cardiology Consultation/Follow Up  [facility] Cardiology     CC: Patient is here for routine 6 months follow-up.    CEM Boudreaux was admitted to hospital on 8/11/2023 with SVT, converted with adenosine to normal sinus rhythm.  Troponins were noted to be elevated.  She underwent left heart cath which showed mild nonobstructive CAD.  Today she remains in normal sinus rhythm.  Denies any chest pain. No further recurrence of palpitations/tachycardia, dyspnea on exertion at baseline.      Past Medical:  Past Medical History:   Diagnosis Date    Anemia     Arthritis     rheumatoid    Asthma     CAD (coronary artery disease)     COPD (chronic obstructive pulmonary disease) (HCC)     Diabetes mellitus (HCC)     DJD (degenerative joint disease)     GERD (gastroesophageal reflux disease)     History of bleeding ulcers     Hypertension     Rheumatoid arthritis(714.0)        Past Surgical:  Past Surgical History:   Procedure Laterality Date    CARDIAC CATHETERIZATION  08/11/2023    CARDIAC PROCEDURE N/A 8/11/2023    bacon / Left heart cath / coronary angiography / op [facility] performed by Johana Bacon MD at [facility] CARDIAC CATH LAB    CERVICAL DISCECTOMY      CHOLECYSTECTOMY      COLONOSCOPY  11/08/2010    1CM RECURRENT SESSILE POLYP REMOVED AND CAUTERIZED    COLONOSCOPY  11/08/2010    5MM POLYP AT 20CM FROM ANAL VERGE  REMOVED AND BIOPSIED WITH COLD BIOPSY FORCEPS    HYSTERECTOMY, TOTAL ABDOMINAL (CERVIX REMOVED)  04/12/2011    JOINT REPLACEMENT Bilateral 05/05/2014    Dr Dong    LAMINECTOMY      LUMBAR DISCECTOMY      LUMBAR SPINE SURGERY  10/19/2015    removal of L4/L5 hardware and fusion of L3/L4 lumbar spine by Dr. Damon    OTHER SURGICAL HISTORY Right 08/18/2011    SIJ    OTHER SURGICAL HISTORY  12/11/2012    caudal epidural    OTHER SURGICAL HISTORY Right 05/23/2013     L4 TFE    OTHER SURGICAL HISTORY Right 01/23/2014    HIP INJECTION    SPINE SURGERY  2010    Fusion and cage     TOTAL HIP

## 2024-05-06 ENCOUNTER — OFFICE VISIT (OUTPATIENT)
Dept: RHEUMATOLOGY | Age: 59
End: 2024-05-06
Payer: COMMERCIAL

## 2024-05-06 VITALS
DIASTOLIC BLOOD PRESSURE: 58 MMHG | HEIGHT: 60 IN | OXYGEN SATURATION: 95 % | HEART RATE: 88 BPM | BODY MASS INDEX: 38.08 KG/M2 | SYSTOLIC BLOOD PRESSURE: 90 MMHG

## 2024-05-06 DIAGNOSIS — Z51.81 MEDICATION MONITORING ENCOUNTER: ICD-10-CM

## 2024-05-06 DIAGNOSIS — M54.41 CHRONIC BILATERAL LOW BACK PAIN WITH RIGHT-SIDED SCIATICA: ICD-10-CM

## 2024-05-06 DIAGNOSIS — M06.30 RHEUMATOID NODULES (HCC): ICD-10-CM

## 2024-05-06 DIAGNOSIS — M06.9 RHEUMATOID ARTHRITIS FLARE (HCC): ICD-10-CM

## 2024-05-06 DIAGNOSIS — M80.00XA OSTEOPOROSIS WITH CURRENT PATHOLOGICAL FRACTURE, UNSPECIFIED OSTEOPOROSIS TYPE, INITIAL ENCOUNTER: ICD-10-CM

## 2024-05-06 DIAGNOSIS — M05.79 RHEUMATOID ARTHRITIS INVOLVING MULTIPLE SITES WITH POSITIVE RHEUMATOID FACTOR (HCC): Primary | ICD-10-CM

## 2024-05-06 DIAGNOSIS — G89.29 CHRONIC BILATERAL LOW BACK PAIN WITH RIGHT-SIDED SCIATICA: ICD-10-CM

## 2024-05-06 PROCEDURE — 4004F PT TOBACCO SCREEN RCVD TLK: CPT | Performed by: INTERNAL MEDICINE

## 2024-05-06 PROCEDURE — 96372 THER/PROPH/DIAG INJ SC/IM: CPT | Performed by: INTERNAL MEDICINE

## 2024-05-06 PROCEDURE — 3074F SYST BP LT 130 MM HG: CPT | Performed by: INTERNAL MEDICINE

## 2024-05-06 PROCEDURE — 99214 OFFICE O/P EST MOD 30 MIN: CPT | Performed by: INTERNAL MEDICINE

## 2024-05-06 PROCEDURE — G8417 CALC BMI ABV UP PARAM F/U: HCPCS | Performed by: INTERNAL MEDICINE

## 2024-05-06 PROCEDURE — G8427 DOCREV CUR MEDS BY ELIG CLIN: HCPCS | Performed by: INTERNAL MEDICINE

## 2024-05-06 PROCEDURE — 3017F COLORECTAL CA SCREEN DOC REV: CPT | Performed by: INTERNAL MEDICINE

## 2024-05-06 PROCEDURE — 3078F DIAST BP <80 MM HG: CPT | Performed by: INTERNAL MEDICINE

## 2024-05-06 RX ORDER — HYDROXYCHLOROQUINE SULFATE 200 MG/1
TABLET, FILM COATED ORAL
Qty: 60 TABLET | Refills: 5 | Status: SHIPPED | OUTPATIENT
Start: 2024-05-06

## 2024-05-06 RX ORDER — PREDNISONE 2.5 MG/1
TABLET ORAL
Qty: 90 TABLET | Refills: 3 | Status: SHIPPED | OUTPATIENT
Start: 2024-05-06

## 2024-05-06 RX ORDER — METHYLPREDNISOLONE ACETATE 80 MG/ML
80 INJECTION, SUSPENSION INTRA-ARTICULAR; INTRALESIONAL; INTRAMUSCULAR; SOFT TISSUE ONCE
Status: COMPLETED | OUTPATIENT
Start: 2024-05-06 | End: 2024-05-06

## 2024-05-06 RX ADMIN — METHYLPREDNISOLONE ACETATE 80 MG: 80 INJECTION, SUSPENSION INTRA-ARTICULAR; INTRALESIONAL; INTRAMUSCULAR; SOFT TISSUE at 16:44

## 2024-05-06 ASSESSMENT — ENCOUNTER SYMPTOMS
RESPIRATORY NEGATIVE: 1
EYES NEGATIVE: 1
GASTROINTESTINAL NEGATIVE: 1
BACK PAIN: 1

## 2024-05-06 ASSESSMENT — JOINT PAIN
TOTAL NUMBER OF TENDER JOINTS: 5
TOTAL NUMBER OF SWOLLEN JOINTS: 10

## 2024-05-06 NOTE — PROGRESS NOTES
Administrations This Visit       methylPREDNISolone acetate (DEPO-MEDROL) injection 80 mg       Admin Date  05/06/2024 Action  Given Dose  80 mg Route  IntraMUSCular Administered By  Rut Smith RN                  Given in left arm. Patient tolerated well.

## 2024-05-06 NOTE — PROGRESS NOTES
[facility] RHEUMATOLOGY FOLLOW UP NOTE       Date Of Service: 5/6/2024  Provider: ALONSO ZHOU DO, DO  PCP: Alhaji Rodas MD   Name: Nikki Boudreaux   MRN: [id number]      Subjective   Chief Complaint   Patient presents with    Follow-up     2 month f/u Rheumatoid arthritis  She is needing refills on plaquenil and prednisone.  She is having pain all over. She is having the most pain in her lower back. Her pain is a 5.  She has not felt well in the past month. She does not handle weather changes well.   She is hoping for a steroid injection. She thinks she is due for prolia injection.         Nikki Boudreaux  is a(n)58 y.o. female here for the f/u evaluation of  Rheumatoid Arthritis osteoarthritis ,      Interval hx:   - lower back pain - started with physical therapy around a month or more. 5/10 -- worsens as the \"medications wears off\"    Rheumatoid Arthritis - worsening joint pains over the past few weeks if not more. Worsened with the recent weather changes. Reported generalized pain. + joint swelling, + AM stiffness lasting \"at least an hour and a half\".   -- + dry mouth     Restart smoking about 1 weeks ago    Osteoporosis: Last Prolia injection February 2023. Currently overdue.  Unfortunately we were notified today that the patient needed to contact the pharmacy to have the medication delivered  REVIEW OF SYSTEMS: (ROS)    Review of Systems   Constitutional:  Positive for fatigue.   HENT: Negative.     Eyes: Negative.    Respiratory: Negative.     Cardiovascular: Negative.    Gastrointestinal: Negative.    Endocrine: Negative.    Genitourinary: Negative.    Musculoskeletal:  Positive for arthralgias, back pain and joint swelling.   Skin: Negative.    Neurological: Negative.    Hematological: Negative.        PmHx:  has a past medical history of Anemia, Arthritis, Asthma, CAD (coronary artery disease), COPD (chronic obstructive pulmonary disease) ([facility]), Diabetes mellitus (HCC), DJD (degenerative joint

## 2024-05-17 LAB
ALBUMIN SERPL-MCNC: 0.6 G/DL
ALP BLD-CCNC: 114 U/L
ALT SERPL-CCNC: 10 U/L
ANION GAP SERPL CALCULATED.3IONS-SCNC: 12 MMOL/L
AST SERPL-CCNC: 13 U/L
BASOPHILS ABSOLUTE: ABNORMAL
BASOPHILS RELATIVE PERCENT: ABNORMAL
BILIRUB SERPL-MCNC: 0.4 MG/DL (ref 0.1–1.4)
BUN BLDV-MCNC: 13 MG/DL
C-REACTIVE PROTEIN: 0.6
CALCIUM SERPL-MCNC: 8.8 MG/DL
CHLORIDE BLD-SCNC: 101 MMOL/L
CO2: 27 MMOL/L
CREAT SERPL-MCNC: 0.79 MG/DL
EGFR: 86
EOSINOPHILS ABSOLUTE: ABNORMAL
EOSINOPHILS RELATIVE PERCENT: ABNORMAL
GLUCOSE BLD-MCNC: 121 MG/DL
HCT VFR BLD CALC: 34.1 % (ref 36–46)
HEMOGLOBIN: 10.2 G/DL (ref 12–16)
LYMPHOCYTES ABSOLUTE: 1.2 /ΜL
LYMPHOCYTES RELATIVE PERCENT: 12 %
MCH RBC QN AUTO: 20.3 PG
MCHC RBC AUTO-ENTMCNC: 29.9 G/DL
MCV RBC AUTO: 68 FL
MONOCYTES ABSOLUTE: 0.3 /ΜL
MONOCYTES RELATIVE PERCENT: 3 %
NEUTROPHILS ABSOLUTE: 8.6 /ΜL
NEUTROPHILS RELATIVE PERCENT: 85 %
PDW BLD-RTO: 17.3 %
PLATELET # BLD: 278 K/ΜL
PMV BLD AUTO: 8.9 FL
POTASSIUM SERPL-SCNC: 4.1 MMOL/L
RBC # BLD: 5.03 10^6/ΜL
SEDIMENTATION RATE, ERYTHROCYTE: 116
SODIUM BLD-SCNC: 140 MMOL/L
TOTAL PROTEIN: 7.2
WBC # BLD: 10.1 10^3/ML

## 2024-05-21 DIAGNOSIS — D50.9 MICROCYTIC ANEMIA: ICD-10-CM

## 2024-05-21 DIAGNOSIS — M05.79 RHEUMATOID ARTHRITIS INVOLVING MULTIPLE SITES WITH POSITIVE RHEUMATOID FACTOR (HCC): Primary | ICD-10-CM

## 2024-05-21 NOTE — RESULT ENCOUNTER NOTE
Your recent blood test do reveal a worsened anemia over the past 9 months.  Along with a significantly elevated inflammatory marker known as sed rate.    Additional testing has been ordered to further evaluate the anemia.  If you are wanting to have these completed at a facility outside of [facility] please let us know and we can have them faxed to the requested facility

## 2024-05-22 DIAGNOSIS — M05.79 RHEUMATOID ARTHRITIS INVOLVING MULTIPLE SITES WITH POSITIVE RHEUMATOID FACTOR (HCC): ICD-10-CM

## 2024-05-22 RX ORDER — LEFLUNOMIDE 20 MG/1
20 TABLET ORAL WEEKLY
Qty: 8 TABLET | Refills: 0 | Status: SHIPPED | OUTPATIENT
Start: 2024-05-22

## 2024-05-22 NOTE — TELEPHONE ENCOUNTER
Patient notified and verbalized understanding.     [phone].     Patient requesting refill on Leflunomide.     DOLV: 5/6/24  DONV: 7/11/24  LAST LAB DRAW: 5/17/24  LAST TB TEST: 10/12/21    Lab Results   Component Value Date     05/17/2024    K 4.1 05/17/2024     05/17/2024    CO2 27 05/17/2024    BUN 13 05/17/2024    CREATININE 0.79 05/17/2024    GLUCOSE 121 05/17/2024    CALCIUM 8.8 05/17/2024    BILITOT 0.4 05/17/2024    ALKPHOS 114 05/17/2024    AST 13 05/17/2024    ALT 10 05/17/2024    LABGLOM 86 05/17/2024    GFRAA >60 03/12/2020       Recent Labs     05/17/24  1621   WBC 10.1   HGB 10.2*   HCT 34.1*   MCV 68          Lab Results   Component Value Date    SEDRATE 116 05/17/2024       Lab Results   Component Value Date    CRP 0.6 05/17/2024

## 2024-05-22 NOTE — TELEPHONE ENCOUNTER
----- Message from Cheri Florian DO sent at 5/21/2024  4:41 PM EDT -----  Your recent blood test do reveal a worsened anemia over the past 9 months.  Along with a significantly elevated inflammatory marker known as sed rate.    Additional testing has been ordered to further evaluate the anemia.  If you are wanting to have these completed at a facility outside of [facility] please let us know and we can have them faxed to the requested facility

## 2024-05-28 LAB
ALBUMIN SERPL-MCNC: 3.7 G/DL
ALP BLD-CCNC: 97 U/L
ALT SERPL-CCNC: 9 U/L
ANION GAP SERPL CALCULATED.3IONS-SCNC: 9 MMOL/L
AST SERPL-CCNC: 18 U/L
BASOPHILS ABSOLUTE: 0.1 /ΜL
BASOPHILS RELATIVE PERCENT: 1 %
BILIRUB SERPL-MCNC: 0.3 MG/DL (ref 0.1–1.4)
BUN BLDV-MCNC: 15 MG/DL
C-REACTIVE PROTEIN: 0.5
CALCIUM SERPL-MCNC: 8.7 MG/DL
CHLORIDE BLD-SCNC: 101 MMOL/L
CO2: 28 MMOL/L
CREAT SERPL-MCNC: 0.76 MG/DL
EGFR: 90
EOSINOPHILS ABSOLUTE: 0.1 /ΜL
EOSINOPHILS RELATIVE PERCENT: 1 %
GLUCOSE BLD-MCNC: 119 MG/DL
HCT VFR BLD CALC: 33.7 % (ref 36–46)
HEMOGLOBIN: 10.1 G/DL (ref 12–16)
LYMPHOCYTES ABSOLUTE: 0.6 /ΜL
LYMPHOCYTES RELATIVE PERCENT: 7.6 %
MCH RBC QN AUTO: 20.5 PG
MCHC RBC AUTO-ENTMCNC: 30 G/DL
MCV RBC AUTO: 68 FL
MONOCYTES ABSOLUTE: 0.3 /ΜL
MONOCYTES RELATIVE PERCENT: 3.8 %
NEUTROPHILS ABSOLUTE: 7.3 /ΜL
NEUTROPHILS RELATIVE PERCENT: 86.6 %
PDW BLD-RTO: 17.8 %
PLATELET # BLD: 265 K/ΜL
PMV BLD AUTO: 8.7 FL
POTASSIUM SERPL-SCNC: 4.7 MMOL/L
RBC # BLD: 4.92 10^6/ΜL
SEDIMENTATION RATE, ERYTHROCYTE: 93
SODIUM BLD-SCNC: 138 MMOL/L
TOTAL PROTEIN: 7.1
WBC # BLD: 8.4 10^3/ML

## 2024-05-31 NOTE — RESULT ENCOUNTER NOTE
Lab testing revealing a continued decline in the anemia.  I would like for you to follow-up with your primary care provider for further evaluations.  Additional test have been ordered to further evaluate potential causes.    The inflammatory marker noted sed rate continues to be significantly elevated

## 2024-06-03 ENCOUNTER — TELEPHONE (OUTPATIENT)
Dept: RHEUMATOLOGY | Age: 59
End: 2024-06-03

## 2024-06-03 NOTE — TELEPHONE ENCOUNTER
Pt stated she has been anemic her entire life and she was advise the sed rate inflammation as well. She commented that she doesn't care about that. Provider has been informed.

## 2024-06-03 NOTE — TELEPHONE ENCOUNTER
----- Message from Cheri Florian DO sent at 5/31/2024 12:01 PM EDT -----    Lab testing revealing a continued decline in the anemia.  I would like for you to follow-up with your primary care provider for further evaluations.  Additional test have been ordered to further evaluate potential causes.    The inflammatory marker noted sed rate continues to be significantly elevated

## 2024-06-25 LAB
ALBUMIN SERPL-MCNC: 3.4 G/DL
ALP BLD-CCNC: 76 U/L
ALT SERPL-CCNC: 7 U/L
ANION GAP SERPL CALCULATED.3IONS-SCNC: 9 MMOL/L
AST SERPL-CCNC: 14 U/L
BASOPHILS ABSOLUTE: 0.1 /ΜL
BASOPHILS RELATIVE PERCENT: 0.7 %
BILIRUB SERPL-MCNC: 0.4 MG/DL (ref 0.1–1.4)
BUN BLDV-MCNC: 11 MG/DL
C-REACTIVE PROTEIN: 1.5
CALCIUM SERPL-MCNC: 8.6 MG/DL
CHLORIDE BLD-SCNC: 103 MMOL/L
CO2: 30 MMOL/L
CREAT SERPL-MCNC: 0.72 MG/DL
EGFR: 90
EOSINOPHILS ABSOLUTE: 0 /ΜL
EOSINOPHILS RELATIVE PERCENT: 0.3 %
GLUCOSE BLD-MCNC: 105 MG/DL
HCT VFR BLD CALC: 32.9 % (ref 36–46)
HEMOGLOBIN: 10.3 G/DL (ref 12–16)
LYMPHOCYTES ABSOLUTE: 0.8 /ΜL
LYMPHOCYTES RELATIVE PERCENT: 7.5 %
MCH RBC QN AUTO: 21.1 PG
MCHC RBC AUTO-ENTMCNC: 31.3 G/DL
MCV RBC AUTO: 68 FL
MONOCYTES ABSOLUTE: 0.5 /ΜL
MONOCYTES RELATIVE PERCENT: 4.6 %
NEUTROPHILS ABSOLUTE: 9.7 /ΜL
NEUTROPHILS RELATIVE PERCENT: 86.9 %
PDW BLD-RTO: 18.1 %
PLATELET # BLD: 223 K/ΜL
PMV BLD AUTO: 10 FL
POTASSIUM SERPL-SCNC: 3.8 MMOL/L
RBC # BLD: 4.87 10^6/ΜL
SEDIMENTATION RATE, ERYTHROCYTE: 99
SODIUM BLD-SCNC: 142 MMOL/L
TOTAL PROTEIN: 6.5
WBC # BLD: 11.1 10^3/ML

## 2024-06-28 ENCOUNTER — TELEPHONE (OUTPATIENT)
Dept: RHEUMATOLOGY | Age: 59
End: 2024-06-28

## 2024-06-28 NOTE — TELEPHONE ENCOUNTER
Patient stated she does not feel any different and that it doesn't seem her Rheumatoid is active.  No recent infections.

## 2024-06-28 NOTE — TELEPHONE ENCOUNTER
----- Message from Cheri Florian DO sent at 6/27/2024  4:29 PM EDT -----  Lab testing use to help evaluate for systemic inflammation continues to be elevated along with a continued anemia that is stable when compared to the last evaluation.    Have you had any recent infections     ? how is the continued rheumatoid arthritis activity

## 2024-07-10 ENCOUNTER — TELEPHONE (OUTPATIENT)
Dept: RHEUMATOLOGY | Age: 59
End: 2024-07-10

## 2024-07-29 ENCOUNTER — TELEPHONE (OUTPATIENT)
Age: 59
End: 2024-07-29

## 2024-07-29 NOTE — TELEPHONE ENCOUNTER
Patient's  called and states BP has abebe running lower than usual.  Normal is 110/64-65. States is has been running in the 90/54-64.  States PCP decreased lopressor from 37.5 to half tablet bid.  Patient's  states this hasn't made a difference.  States she feels \"goofy\" but not dizzy.  Writer advised to hydrate and add gatorade and to cut  back on soda and caffeine.  Writer advised ER if symptoms continue or becomes dizzy.  Also advised to continue to  monitor BP.  He agreed and verbalized understanding.  [phone]

## 2024-08-13 DIAGNOSIS — M05.79 RHEUMATOID ARTHRITIS INVOLVING MULTIPLE SITES WITH POSITIVE RHEUMATOID FACTOR (HCC): ICD-10-CM

## 2024-08-13 RX ORDER — PREDNISONE 2.5 MG/1
TABLET ORAL
Qty: 90 TABLET | Refills: 3 | OUTPATIENT
Start: 2024-08-13

## 2024-08-14 DIAGNOSIS — M05.79 RHEUMATOID ARTHRITIS INVOLVING MULTIPLE SITES WITH POSITIVE RHEUMATOID FACTOR (HCC): ICD-10-CM

## 2024-08-14 RX ORDER — PREDNISONE 2.5 MG/1
TABLET ORAL
Qty: 270 TABLET | Refills: 0 | Status: SHIPPED | OUTPATIENT
Start: 2024-08-14

## 2024-11-14 ENCOUNTER — TELEPHONE (OUTPATIENT)
Dept: RHEUMATOLOGY | Age: 59
End: 2024-11-14

## 2024-11-14 DIAGNOSIS — M80.00XA OSTEOPOROSIS WITH CURRENT PATHOLOGICAL FRACTURE, UNSPECIFIED OSTEOPOROSIS TYPE, INITIAL ENCOUNTER: ICD-10-CM

## 2024-11-14 NOTE — TELEPHONE ENCOUNTER
Patient's  said that he's being told that prolia is needing a Prior Auth. [facility] specialty pharmacy is telling them that they've been trying to contact the office regarding this and have not received a response. I am not seeing any record of this. Patient still does not have medication for her upcoming appt on 11/19/2024    Please advise    Serge is also wanting to know when she got her last injection.

## 2024-11-19 ENCOUNTER — TELEPHONE (OUTPATIENT)
Dept: RHEUMATOLOGY | Age: 59
End: 2024-11-19

## 2024-11-19 ENCOUNTER — OFFICE VISIT (OUTPATIENT)
Dept: RHEUMATOLOGY | Age: 59
End: 2024-11-19
Payer: COMMERCIAL

## 2024-11-19 VITALS
HEART RATE: 94 BPM | OXYGEN SATURATION: 95 % | SYSTOLIC BLOOD PRESSURE: 96 MMHG | BODY MASS INDEX: 30.04 KG/M2 | HEIGHT: 60 IN | WEIGHT: 153 LBS | DIASTOLIC BLOOD PRESSURE: 60 MMHG

## 2024-11-19 DIAGNOSIS — G89.29 CHRONIC BILATERAL LOW BACK PAIN WITH RIGHT-SIDED SCIATICA: ICD-10-CM

## 2024-11-19 DIAGNOSIS — M80.00XA OSTEOPOROSIS WITH CURRENT PATHOLOGICAL FRACTURE, UNSPECIFIED OSTEOPOROSIS TYPE, INITIAL ENCOUNTER: ICD-10-CM

## 2024-11-19 DIAGNOSIS — M06.30 RHEUMATOID NODULES (HCC): ICD-10-CM

## 2024-11-19 DIAGNOSIS — Z51.81 MEDICATION MONITORING ENCOUNTER: ICD-10-CM

## 2024-11-19 DIAGNOSIS — M54.41 CHRONIC BILATERAL LOW BACK PAIN WITH RIGHT-SIDED SCIATICA: ICD-10-CM

## 2024-11-19 DIAGNOSIS — M05.79 RHEUMATOID ARTHRITIS INVOLVING MULTIPLE SITES WITH POSITIVE RHEUMATOID FACTOR (HCC): Primary | ICD-10-CM

## 2024-11-19 PROCEDURE — 4004F PT TOBACCO SCREEN RCVD TLK: CPT | Performed by: INTERNAL MEDICINE

## 2024-11-19 PROCEDURE — 3017F COLORECTAL CA SCREEN DOC REV: CPT | Performed by: INTERNAL MEDICINE

## 2024-11-19 PROCEDURE — G8484 FLU IMMUNIZE NO ADMIN: HCPCS | Performed by: INTERNAL MEDICINE

## 2024-11-19 PROCEDURE — G8417 CALC BMI ABV UP PARAM F/U: HCPCS | Performed by: INTERNAL MEDICINE

## 2024-11-19 PROCEDURE — 3074F SYST BP LT 130 MM HG: CPT | Performed by: INTERNAL MEDICINE

## 2024-11-19 PROCEDURE — G8427 DOCREV CUR MEDS BY ELIG CLIN: HCPCS | Performed by: INTERNAL MEDICINE

## 2024-11-19 PROCEDURE — 99215 OFFICE O/P EST HI 40 MIN: CPT | Performed by: INTERNAL MEDICINE

## 2024-11-19 PROCEDURE — 3078F DIAST BP <80 MM HG: CPT | Performed by: INTERNAL MEDICINE

## 2024-11-19 RX ORDER — MIDODRINE HYDROCHLORIDE 5 MG/1
5 TABLET ORAL 2 TIMES DAILY
COMMUNITY
Start: 2024-10-04

## 2024-11-19 RX ORDER — PREDNISONE 2.5 MG/1
TABLET ORAL
Qty: 270 TABLET | Refills: 0 | Status: SHIPPED | OUTPATIENT
Start: 2024-11-19

## 2024-11-19 RX ORDER — HYDROXYCHLOROQUINE SULFATE 200 MG/1
TABLET, FILM COATED ORAL
Qty: 60 TABLET | Refills: 5 | Status: SHIPPED | OUTPATIENT
Start: 2024-11-19

## 2024-11-19 RX ORDER — ALENDRONATE SODIUM 70 MG/1
70 TABLET ORAL
Qty: 12 TABLET | Refills: 0 | Status: SHIPPED | OUTPATIENT
Start: 2024-11-19

## 2024-11-19 ASSESSMENT — JOINT PAIN
TOTAL NUMBER OF SWOLLEN JOINTS: 6
TOTAL NUMBER OF TENDER JOINTS: 4

## 2024-11-19 ASSESSMENT — ENCOUNTER SYMPTOMS
GASTROINTESTINAL NEGATIVE: 1
BACK PAIN: 1
RESPIRATORY NEGATIVE: 1
EYES NEGATIVE: 1

## 2024-11-19 NOTE — TELEPHONE ENCOUNTER
Spoke with [facility] specialty pharmacy who stated last Prolia script was from 3/5/24. They are showing initially in March a do not fill code. [facility] called patient on 3/7/24 and left a message for patient to call them back. [facility] is not showing any call back. [facility] is showing a call from our office on 5/6/24 when they advised office the Prolia is ready for scheduling and patient needed to speak to billing prior to scheduling. [facility] is next showing a call from patient  on 11/12/24 when they notified a PA was needed. Patient spouse called [facility] again on 11/13/24 and 11/14/24 and was informed the PA is pending. Our office was contacted by patient spouse on 11/14/24 and new script was refilled at that time.     Per Deangelo: Deangelo [facility] Rheumatology Clinical Staff  Called plan because I have not received a determination from the plan yet. Representative stated the plan was compiling a denial letter to submit to me because they show no record of the patient receiving the medication in over a year and have no updated progress notes to say otherwise in the patients chart. Also stated that if the patient did receive the prescription from [facility] march that they were not billing her prescription benefit. Waiting to receive letter    Spoke with Deangelo who stated prolia is being denied due to overdue. No notes stating patient has been on the medication in the past year. Notes stated patient has been on in the past  but has not gotten in the past year. Deangelo can try new prior auth with office note from today if you would like. Thank you.

## 2024-11-19 NOTE — PROGRESS NOTES
LYMPHSABS 1.2 05/17/2024    EOSABS 0.0 06/25/2024    BASOSABS 0.1 06/25/2024         Chemistry        Component Value Date/Time     06/25/2024 1305    K 3.8 06/25/2024 1305     06/25/2024 1305    CO2 30 06/25/2024 1305    BUN 11 06/25/2024 1305    CREATININE 0.72 06/25/2024 1305        Component Value Date/Time    CALCIUM 8.6 06/25/2024 1305    ALKPHOS 76 06/25/2024 1305    AST 14 06/25/2024 1305    ALT 7 06/25/2024 1305    BILITOT 0.4 06/25/2024 1305            Lab Results   Component Value Date    SEDRATE 99 06/25/2024    SEDRATE 93 05/28/2024    SEDRATE 116 05/17/2024    CRP 1.5 06/25/2024    CRP 0.5 05/28/2024    CRP 0.6 05/17/2024         RADIOLOGY / PROCEDURES:

## 2025-01-22 LAB
ALBUMIN: 3.1 G/DL
ALP BLD-CCNC: 41 U/L
ALT SERPL-CCNC: 13 U/L
ANION GAP SERPL CALCULATED.3IONS-SCNC: 11 MMOL/L
AST SERPL-CCNC: 30 U/L
BASOPHILS ABSOLUTE: ABNORMAL
BASOPHILS RELATIVE PERCENT: ABNORMAL
BILIRUB SERPL-MCNC: 0.5 MG/DL (ref 0.1–1.4)
BUN BLDV-MCNC: 12 MG/DL
C-REACTIVE PROTEIN: 0.2
CALCIUM SERPL-MCNC: 8.2 MG/DL
CHLORIDE BLD-SCNC: 102 MMOL/L
CO2: 27 MMOL/L
CREAT SERPL-MCNC: 0.6 MG/DL
EOSINOPHILS ABSOLUTE: ABNORMAL
EOSINOPHILS RELATIVE PERCENT: ABNORMAL
GFR, ESTIMATED: 90
GLUCOSE BLD-MCNC: 55 MG/DL
HCT VFR BLD CALC: 37.5 % (ref 36–46)
HEMOGLOBIN: 11.6 G/DL (ref 12–16)
LYMPHOCYTES ABSOLUTE: 0.4 /ΜL
LYMPHOCYTES RELATIVE PERCENT: 6.8 %
MCH RBC QN AUTO: 22.5 PG
MCHC RBC AUTO-ENTMCNC: 31.1 G/DL
MCV RBC AUTO: 73 FL
MONOCYTES ABSOLUTE: 0.1 /ΜL
MONOCYTES RELATIVE PERCENT: 1.9 %
NEUTROPHILS ABSOLUTE: 5.8 /ΜL
NEUTROPHILS RELATIVE PERCENT: 91.3 %
PDW BLD-RTO: 21.8 %
PLATELET # BLD: 143 K/ΜL
PMV BLD AUTO: 102 FL
POTASSIUM SERPL-SCNC: 3.5 MMOL/L
RBC # BLD: 5.17 10^6/ΜL
SED RATE, AUTOMATED: 43
SODIUM BLD-SCNC: 140 MMOL/L
TOTAL PROTEIN: 5.8 G/DL (ref 6.4–8.2)
WBC # BLD: 6.4 10^3/ML

## 2025-01-22 RX ORDER — LIDOCAINE 50 MG/G
PATCH TOPICAL
Qty: 60 PATCH | Refills: 5 | OUTPATIENT
Start: 2025-01-22

## 2025-01-23 DIAGNOSIS — M05.79 RHEUMATOID ARTHRITIS INVOLVING MULTIPLE SITES WITH POSITIVE RHEUMATOID FACTOR (HCC): ICD-10-CM

## 2025-01-23 RX ORDER — LEFLUNOMIDE 20 MG/1
20 TABLET ORAL WEEKLY
Qty: 8 TABLET | Refills: 0 | Status: SHIPPED | OUTPATIENT
Start: 2025-01-23

## 2025-01-24 ENCOUNTER — TELEPHONE (OUTPATIENT)
Age: 60
End: 2025-01-24

## 2025-01-24 NOTE — TELEPHONE ENCOUNTER
----- Message from Dr. Cheri Florian, DO sent at 1/23/2025  5:28 PM EST -----    Lab testing revealing a mild anemia that has improved since the last evaluation.  The platelet count is mildly low.    The inflammatory marker known as sed rate continues to be elevated but has significantly improved since the June evaluation.    There are a few electrolyte abnormalities with a low calcium level (mild severity after adjusting for the low albumin level).    Please have your labs repeated in 4 weeks

## 2025-02-11 ENCOUNTER — OFFICE VISIT (OUTPATIENT)
Age: 60
End: 2025-02-11
Payer: COMMERCIAL

## 2025-02-11 ENCOUNTER — TELEPHONE (OUTPATIENT)
Age: 60
End: 2025-02-11

## 2025-02-11 VITALS
WEIGHT: 150 LBS | HEIGHT: 60 IN | SYSTOLIC BLOOD PRESSURE: 108 MMHG | BODY MASS INDEX: 29.45 KG/M2 | HEART RATE: 90 BPM | DIASTOLIC BLOOD PRESSURE: 68 MMHG | OXYGEN SATURATION: 97 %

## 2025-02-11 DIAGNOSIS — G89.29 CHRONIC BILATERAL LOW BACK PAIN WITH RIGHT-SIDED SCIATICA: ICD-10-CM

## 2025-02-11 DIAGNOSIS — M80.00XA OSTEOPOROSIS WITH CURRENT PATHOLOGICAL FRACTURE, UNSPECIFIED OSTEOPOROSIS TYPE, INITIAL ENCOUNTER: ICD-10-CM

## 2025-02-11 DIAGNOSIS — M06.30 RHEUMATOID NODULES (HCC): ICD-10-CM

## 2025-02-11 DIAGNOSIS — Z51.81 MEDICATION MONITORING ENCOUNTER: ICD-10-CM

## 2025-02-11 DIAGNOSIS — F17.200 TOBACCO DEPENDENCE: ICD-10-CM

## 2025-02-11 DIAGNOSIS — M05.79 RHEUMATOID ARTHRITIS INVOLVING MULTIPLE SITES WITH POSITIVE RHEUMATOID FACTOR (HCC): Primary | ICD-10-CM

## 2025-02-11 DIAGNOSIS — J98.4 RESTRICTIVE LUNG DISEASE: ICD-10-CM

## 2025-02-11 DIAGNOSIS — M54.41 CHRONIC BILATERAL LOW BACK PAIN WITH RIGHT-SIDED SCIATICA: ICD-10-CM

## 2025-02-11 PROCEDURE — 99214 OFFICE O/P EST MOD 30 MIN: CPT | Performed by: NURSE PRACTITIONER

## 2025-02-11 PROCEDURE — 4004F PT TOBACCO SCREEN RCVD TLK: CPT | Performed by: NURSE PRACTITIONER

## 2025-02-11 PROCEDURE — 3074F SYST BP LT 130 MM HG: CPT | Performed by: NURSE PRACTITIONER

## 2025-02-11 PROCEDURE — G8427 DOCREV CUR MEDS BY ELIG CLIN: HCPCS | Performed by: NURSE PRACTITIONER

## 2025-02-11 PROCEDURE — 3017F COLORECTAL CA SCREEN DOC REV: CPT | Performed by: NURSE PRACTITIONER

## 2025-02-11 PROCEDURE — G8417 CALC BMI ABV UP PARAM F/U: HCPCS | Performed by: NURSE PRACTITIONER

## 2025-02-11 PROCEDURE — 3078F DIAST BP <80 MM HG: CPT | Performed by: NURSE PRACTITIONER

## 2025-02-11 RX ORDER — HYDROXYCHLOROQUINE SULFATE 200 MG/1
TABLET, FILM COATED ORAL
Qty: 60 TABLET | Refills: 5 | Status: SHIPPED | OUTPATIENT
Start: 2025-02-11

## 2025-02-11 RX ORDER — CUSHION
EACH MISCELLANEOUS
Qty: 1 EACH | Refills: 0 | Status: SHIPPED | OUTPATIENT
Start: 2025-02-11

## 2025-02-11 RX ORDER — PREDNISONE 2.5 MG/1
TABLET ORAL
Qty: 270 TABLET | Refills: 0 | Status: SHIPPED | OUTPATIENT
Start: 2025-02-11

## 2025-02-11 ASSESSMENT — ENCOUNTER SYMPTOMS
GASTROINTESTINAL NEGATIVE: 1
RESPIRATORY NEGATIVE: 1
EYES NEGATIVE: 1
BACK PAIN: 1

## 2025-02-11 NOTE — PROGRESS NOTES
right 2-5    + swan neck changes, distal left 2nd finger amputation    Lower extremities:  HIPS nontender  KNEES nontender, no swelling  ANKLES nontender   FEET : tender and swelling bilat MTPs       LABS    CBC  Lab Results   Component Value Date/Time    WBC 6.4 01/22/2025 09:42 AM    RBC 5.17 01/22/2025 09:42 AM    HGB 11.6 01/22/2025 09:42 AM    HCT 37.5 01/22/2025 09:42 AM    MCV 73 01/22/2025 09:42 AM    MCH 22.5 01/22/2025 09:42 AM    MCHC 31.1 01/22/2025 09:42 AM    RDW 21.8 01/22/2025 09:42 AM     01/22/2025 09:42 AM     01/22/2025 09:42 AM       CMP  Lab Results   Component Value Date/Time    CALCIUM 8.2 01/22/2025 09:42 AM     01/22/2025 09:42 AM    K 3.5 01/22/2025 09:42 AM    CO2 27 01/22/2025 09:42 AM     01/22/2025 09:42 AM    BUN 12 01/22/2025 09:42 AM    CREATININE 0.60 01/22/2025 09:42 AM    ALKPHOS 41 01/22/2025 09:42 AM    ALT 13 01/22/2025 09:42 AM    AST 30 01/22/2025 09:42 AM       HgBA1c: No components found for: \"HGBA1C\"    Lab Results   Component Value Date/Time    VITD25 103.5 10/12/2021 12:00 AM         No results found for: \"ANASCRN\"  No results found for: \"SSA\"  No results found for: \"SSB\"  No results found for: \"ANTI-SMITH\"  No results found for: \"DSDNAAB\"   No results found for: \"ANTIRNP\"  No results found for: \"C3\", \"C4\"  No results found for: \"CCPAB\"  Lab Results   Component Value Date    RF >360 10/12/2021       No components found for: \"CANCASCRN\", \"APANCASCRN\"  Lab Results   Component Value Date    SEDRATE 43 01/22/2025     Lab Results   Component Value Date    CRP 0.2 01/22/2025       RADIOLOGY:         ASSESSMENT/PLAN    Assessment   Plan     Seropositive rheumatoid arthritis +CCP >300, RF >360  Rheumatoid nodulosis- no active nodules   - diagnosed in 30's  - prior tx: methotrexate PO (nausea), subcu (LFT elevation), xeljanz (diarrhea and vomiting), humira (nausea), arava (hair loss), enbrel (rash, vomiting, vision changes), rituxan (rash, increased

## 2025-02-11 NOTE — TELEPHONE ENCOUNTER
Received call from [facility] pharmacy stating they received script for wheelchair cushion. That is not something they are able to fill. Spoke with patient who stated they bought one already.

## 2025-02-24 ENCOUNTER — TELEPHONE (OUTPATIENT)
Age: 60
End: 2025-02-24

## 2025-02-24 DIAGNOSIS — E83.51 HYPOCALCEMIA: Primary | ICD-10-CM

## 2025-02-24 NOTE — TELEPHONE ENCOUNTER
Received call from [facility] requesting to ship Prolia to office. Expected delivery date tomorrow,  2/25/25. Awaiting Prolia.

## 2025-02-25 NOTE — TELEPHONE ENCOUNTER
Lab order faxed to [facility] per patient request at [phone]. Patient notified. Awaiting lab results.

## 2025-02-25 NOTE — TELEPHONE ENCOUNTER
Prolia received and placed in office refrigerator. Upon review of chart, patient calcium level on 1/22/25 low at 8.2. patient stated she has been taking Calcium citrate since last visit. She is unsure of the dose. Please advise. Thank you.         [facility] ([address]) for labs

## 2025-03-06 NOTE — TELEPHONE ENCOUNTER
Informed patient office has not yet received her repeat lab. Patient stated she has not yet completed the blood work. Patient stated she will get blood work completed. Awaiting blood work.

## 2025-03-12 NOTE — TELEPHONE ENCOUNTER
Spouse on HIPAA, called back stating they had labs completed last Friday. Upon chart review, no BMP was drawn. Lab order faxed to [phone] per spouse request.

## 2025-03-20 NOTE — TELEPHONE ENCOUNTER
Spoke with patient who stated she has not been able to get her blood redrawn. She had a fall and xrays completed. Results available in care everywhere. She is not able to walk well since the fall. She will get labs completed once she is able to move around better.

## 2025-03-21 LAB
BUN BLDV-MCNC: 8 MG/DL
CALCIUM SERPL-MCNC: 8 MG/DL
CHLORIDE BLD-SCNC: 97 MMOL/L
CO2: 31 MMOL/L
CREAT SERPL-MCNC: 0.51 MG/DL
EGFR: 90
GLUCOSE BLD-MCNC: 65 MG/DL
POTASSIUM SERPL-SCNC: 3.6 MMOL/L
SODIUM BLD-SCNC: 139 MMOL/L

## 2025-03-24 ENCOUNTER — RESULTS FOLLOW-UP (OUTPATIENT)
Age: 60
End: 2025-03-24

## 2025-04-01 DIAGNOSIS — M05.79 RHEUMATOID ARTHRITIS INVOLVING MULTIPLE SITES WITH POSITIVE RHEUMATOID FACTOR (HCC): ICD-10-CM

## 2025-04-01 RX ORDER — PREDNISONE 2.5 MG/1
TABLET ORAL
Qty: 270 TABLET | Refills: 0 | Status: SHIPPED | OUTPATIENT
Start: 2025-04-01

## 2025-05-27 NOTE — TELEPHONE ENCOUNTER
Pt has questions regarding Cimzia. She spoke with nurse support and was advise if she has asthma, mucus, or a cough that she should not take Cimzia. She did state she is not using her inhaler and does have all the symptoms that are listed.     Please advise Telemetry Shift Summary    Rhythm SR-ST  HR Range   Ectopy rare PVCs  Measurements 0.22/0.10/0.30        Normal Values  Rhythm SR  HR Range    Measurements 0.12-0.20 / 0.06-0.10  / 0.30-0.52

## (undated) DEVICE — SURGICAL PROCEDURE TRAY CRD CATH SVMMC

## (undated) DEVICE — PERCUTANEOUS ENTRY THINWALL NEEDLE  ONE-PART: Brand: COOK

## (undated) DEVICE — ANGIOGRAPHIC CATHETER: Brand: EXPO™